# Patient Record
Sex: FEMALE | Race: WHITE | NOT HISPANIC OR LATINO | Employment: OTHER | URBAN - METROPOLITAN AREA
[De-identification: names, ages, dates, MRNs, and addresses within clinical notes are randomized per-mention and may not be internally consistent; named-entity substitution may affect disease eponyms.]

---

## 2017-02-03 ENCOUNTER — GENERIC CONVERSION - ENCOUNTER (OUTPATIENT)
Dept: OTHER | Facility: OTHER | Age: 47
End: 2017-02-03

## 2017-02-06 DIAGNOSIS — Z00.00 ENCOUNTER FOR GENERAL ADULT MEDICAL EXAMINATION WITHOUT ABNORMAL FINDINGS: ICD-10-CM

## 2017-06-20 ENCOUNTER — OFFICE VISIT (OUTPATIENT)
Dept: URGENT CARE | Facility: CLINIC | Age: 47
End: 2017-06-20
Payer: COMMERCIAL

## 2017-06-20 PROCEDURE — 99214 OFFICE O/P EST MOD 30 MIN: CPT

## 2017-06-20 PROCEDURE — S9088 SERVICES PROVIDED IN URGENT: HCPCS

## 2017-10-19 ENCOUNTER — ALLSCRIPTS OFFICE VISIT (OUTPATIENT)
Dept: OTHER | Facility: OTHER | Age: 47
End: 2017-10-19

## 2017-10-19 DIAGNOSIS — F31.32 BIPOLAR DISORDER, CURRENT EPISODE DEPRESSED, MODERATE (HCC): ICD-10-CM

## 2017-10-19 DIAGNOSIS — Z00.00 ENCOUNTER FOR GENERAL ADULT MEDICAL EXAMINATION WITHOUT ABNORMAL FINDINGS: ICD-10-CM

## 2017-10-19 DIAGNOSIS — N92.0 EXCESSIVE AND FREQUENT MENSTRUATION WITH REGULAR CYCLE: ICD-10-CM

## 2017-10-19 DIAGNOSIS — Z12.11 ENCOUNTER FOR SCREENING FOR MALIGNANT NEOPLASM OF COLON: ICD-10-CM

## 2017-10-31 ENCOUNTER — APPOINTMENT (OUTPATIENT)
Dept: LAB | Facility: HOSPITAL | Age: 47
End: 2017-10-31
Payer: COMMERCIAL

## 2017-10-31 DIAGNOSIS — Z12.11 ENCOUNTER FOR SCREENING FOR MALIGNANT NEOPLASM OF COLON: ICD-10-CM

## 2017-10-31 LAB — HEMOCCULT STL QL IA: NEGATIVE

## 2017-10-31 PROCEDURE — G0328 FECAL BLOOD SCRN IMMUNOASSAY: HCPCS

## 2018-01-15 NOTE — PROGRESS NOTES
Assessment    1  Bipolar I disorder, most recent episode depressed, moderate (296 52) (F31 32)   2  Encounter for preventive health examination (V70 0) (Z00 00)   3  Menorrhagia with regular cycle (626 2) (N92 0)   4  Fatigue (780 79) (R53 83)    Plan  Bipolar I disorder, most recent episode depressed, moderate    · (1) CBC/PLT/DIFF; Status:Active - Retrospective By Protocol Authorization; Requested  for:19Oct2017;    · (1) COMPREHENSIVE METABOLIC PANEL; Status:Active - Retrospective By Protocol  Authorization; Requested for:19Oct2017;    · (1) TSH WITH FT4 REFLEX; Status:Active - Retrospective By Protocol Authorization; Requested WFU:[id number];    · *VB - Depression Follow Up With Primary Care Provider Evaluation and Treatment   Follow-up  Status: Hold For - Scheduling,Retrospective By Protocol Authorization   Requested for: [id number]  Bipolar I disorder, most recent episode depressed, moderate, Health Maintenance    · (1) LIPID PANEL, FASTING; Status:Active - Retrospective By Protocol Authorization; Requested for:19Oct2017;    · (1) URINALYSIS (will reflex a microscopy if leukocytes, occult blood, protein or nitrites  are not within normal limits); Status:Active - Retrospective By Protocol Authorization; Requested FARIDEH:[id number];   Screening for colon cancer    · (1) OCCULT BLOOD, FECAL IMMUNOCHEMICAL TEST; Status:Active - Retrospective By  Protocol Authorization; Requested HLU:[id number];     Discussion/Summary  health maintenance visit cervical cancer screening is current Breast cancer screening: mammogram is needed every two years  Colorectal cancer screening: fecal occult blood testing supplies were given  Screening lab work includes hemoglobin, glucose, lipid profile, thyroid function testing, 25-hydroxyvitamin D and urinalysis  The risks and benefits of immunizations were discussed  Patient discussion: discussed with the patient       Follow up lab work  Will call with results  1 year follow up unless any problems or concerns arise  The patient was counseled regarding  total time of encounter was 30 min minutes  Possible side effects of new medications were reviewed with the patient/guardian today  The treatment plan was reviewed with the patient/guardian  The patient/guardian understands and agrees with the treatment plan      Chief Complaint  annual well visit      History of Present Illness  HM, Adult Female: The patient is being seen for a health maintenance evaluation  The last health maintenance visit was 1 year(s) ago  General Health: The patient's health since the last visit is described as good  She has regular dental visits  She complains of vision problems  Vision care includes wearing glasses and an eye examination within the last year  The patient complains of bifocals for reading  She has hearing loss  Lifestyle:  She consumes a diverse and healthy diet  She has weight concerns  Weight control issues: recent > 5 lbs lbs weight gain  She exercises regularly  She exercises 3 or more times per week  Exercise includes aerobic conditioning  She does not use tobacco  She denies alcohol use  She denies drug use  Reproductive health: the patient is premenopausal    Screening: cancer screening reviewed and current  metabolic screening reviewed and current  risk screening reviewed and current  HPI: Pt here for annual wellness check up  Lost her lab slips but will get new blood work next week and FIT test  Pt reports weight gain despite rigorous work out, emotional lability, long menses, hot flashes, hair falling out, is worried her thyroid function may be "out of whack"      Review of Systems    Constitutional: feeling tired, but no fever, not feeling poorly and no chills  Eyes: eyesight problems, but no eye pain, eyes not red and no purulent discharge from the eyes  ENT: seasonal allergies, but no earache, no nosebleeds, no hearing loss and no nasal discharge     Cardiovascular: the heart rate was not slow, no chest pain, the heart rate was not fast and no palpitations  Respiratory: no shortness of breath, no cough, no wheezing and no shortness of breath during exertion  Gastrointestinal: no abdominal pain, no nausea, no constipation and no diarrhea  Genitourinary: unexplained vaginal bleeding and heavy menses, but no dysuria, no pelvic pain and no dysmenorrhea  Musculoskeletal: no arthralgias, no joint swelling, no myalgias and no joint stiffness  Neurological: no headache, no confusion and no dizziness  Psychiatric: anxiety, sleep disturbances, depression and intermittent, but not suicidal    Endocrine: hot flashes  Hematologic/Lymphatic: no swollen glands and no swollen glands in the neck  ROS reviewed  Active Problems    1  Acute bronchitis (466 0) (J20 9)   2  Bipolar I disorder, most recent episode depressed, moderate (296 52) (F31 32)   3  Thoracic back pain (724 1) (M54 6)    Surgical History    · History of  Section   · Denied: History of Recent Surgery    Family History  Mother    · No pertinent family history  Father    · No pertinent family history    Social History    · Denied: History of Alcohol   ·    · Never a smoker   · No alcohol use   · No drug use   · Three children   · Unemployed (V62 0) (Z56 0)    Current Meds   1  Depakote 250 MG Oral Tablet Delayed Release; Therapy: (Recorded:[id number]) to Recorded   2  Doxycycline Monohydrate 100 MG Oral Capsule; TAKE 1 CAPSULE TWICE DAILY WITH   FOOD; Therapy: [id number] to (Evaluate:2017)  Requested for: 2017; Last   Rx:2017 Ordered   3  Wellbutrin  MG Oral Tablet Extended Release 12 Hour; Therapy: (Recorded:25Nos4266) to Recorded    Allergies    1   No Known Drug Allergies    Vitals   Recorded: [id number] 08:35AM Recorded: 08FDI7694 08:34AM   Temperature 31 0 F    Systolic 498    Diastolic 70    Height 5 ft 4 in 5 ft 4 in   Weight 130 lb  133 lb    BMI Calculated 22 31 22 83   BSA Calculated 1 63 1 64     Physical Exam    Constitutional   General appearance: No acute distress, well appearing and well nourished  Head and Face   Head and face: Normal     Palpation of the face and sinuses: No sinus tenderness  Eyes   Conjunctiva and lids: No swelling, erythema or discharge  Pupils and irises: Equal, round, reactive to light  Ophthalmoscopic examination: Normal fundi and optic discs  Ears, Nose, Mouth, and Throat   External inspection of ears and nose: Normal     Otoscopic examination: Tympanic membranes translucent with normal light reflex  Canals patent without erythema  Hearing: Normal     Nasal mucosa, septum, and turbinates: Normal without edema or erythema  Lips, teeth, and gums: Normal, good dentition  Oropharynx: Normal with no erythema, edema, exudate or lesions  Neck   Neck: Supple, symmetric, trachea midline, no masses  Thyroid: Normal, no thyromegaly  Pulmonary   Respiratory effort: No increased work of breathing or signs of respiratory distress  Percussion of chest: Normal     Palpation of chest: Normal     Auscultation of lungs: Clear to auscultation  Cardiovascular   Palpation of heart: Normal PMI, no thrills  Auscultation of heart: Normal rate and rhythm, normal S1 and S2, no murmurs  Abdomen   Abdomen: Non-tender, no masses  Liver and spleen: No hepatomegaly or splenomegaly  Lymphatic   Palpation of lymph nodes in neck: No lymphadenopathy  Musculoskeletal   Gait and station: Normal     Skin   Skin and subcutaneous tissue: Normal without rashes or lesions  Neurologic   Cranial nerves: Cranial nerves II-XII intact  Reflexes: 2+ and symmetric  Sensation: No sensory loss  Coordination: Normal finger to nose and heel to shin  Psychiatric   Judgment and insight: Normal     Orientation to person, place, and time: Normal     Recent and remote memory: Intact      Mood and affect: Normal        Results/Data  PHQ-9 Adult Depression Screening 18RLJ3227 09:18AM User, s     Test Name Result Flag Reference   PHQ-9 Adult Depression Score 12     Over the last two weeks, how often have you been bothered by any of the following problems? Little interest or pleasure in doing things: Several days - 1  Feeling down, depressed, or hopeless: Several days - 1  Trouble falling or staying asleep, or sleeping too much: Several days - 1  Feeling tired or having little energy: More than half the days - 2  Poor appetite or over eating: More than half the days - 2  Feeling bad about yourself - or that you are a failure or have let yourself or your family down: More than half the days - 2  Trouble concentrating on things, such as reading the newspaper or watching television: Several days - 1  Moving or speaking so slowly that other people could have noticed   Or the opposite -  being so fidgety or restless that you have been moving around a lot more than usual: More than half the days - 2  Thoughts that you would be better off dead, or of hurting yourself in some way: Not at all - 0   PHQ-9 Adult Depression Screening Positive     PHQ-9 Difficulty Level Not difficult at all     PHQ-9 Severity Moderate Depression         Future Appointments    Date/Time Provider Specialty Site   10/30/2018 08:30 AM Corinne Kinney [facility]  [address] [place] [address]   Electronically signed by : Corinne Nevarez [facility]; Oct 19 2017  9:29AM EST                       (Author)    Electronically signed by : Kodi Rodriguez DO; Oct 19 2017  4:42PM EST

## 2018-01-22 VITALS
BODY MASS INDEX: 22.2 KG/M2 | DIASTOLIC BLOOD PRESSURE: 70 MMHG | HEIGHT: 64 IN | WEIGHT: 130 LBS | TEMPERATURE: 96.9 F | SYSTOLIC BLOOD PRESSURE: 120 MMHG

## 2018-04-13 ENCOUNTER — OFFICE VISIT (OUTPATIENT)
Dept: URGENT CARE | Facility: CLINIC | Age: 48
End: 2018-04-13
Payer: MEDICARE

## 2018-04-13 VITALS
SYSTOLIC BLOOD PRESSURE: 114 MMHG | RESPIRATION RATE: 18 BRPM | TEMPERATURE: 97 F | DIASTOLIC BLOOD PRESSURE: 69 MMHG | OXYGEN SATURATION: 99 % | HEART RATE: 64 BPM

## 2018-04-13 DIAGNOSIS — J20.9 ACUTE BRONCHITIS, UNSPECIFIED ORGANISM: ICD-10-CM

## 2018-04-13 DIAGNOSIS — J01.90 ACUTE SINUSITIS, RECURRENCE NOT SPECIFIED, UNSPECIFIED LOCATION: Primary | ICD-10-CM

## 2018-04-13 PROCEDURE — G0463 HOSPITAL OUTPT CLINIC VISIT: HCPCS | Performed by: NURSE PRACTITIONER

## 2018-04-13 PROCEDURE — 99213 OFFICE O/P EST LOW 20 MIN: CPT | Performed by: NURSE PRACTITIONER

## 2018-04-13 RX ORDER — BUPROPION HYDROCHLORIDE 150 MG/1
TABLET, EXTENDED RELEASE ORAL
COMMUNITY
End: 2020-10-09 | Stop reason: HOSPADM

## 2018-04-13 RX ORDER — AMOXICILLIN 500 MG/1
500 CAPSULE ORAL EVERY 8 HOURS SCHEDULED
Qty: 21 CAPSULE | Refills: 0 | Status: SHIPPED | OUTPATIENT
Start: 2018-04-13 | End: 2018-04-20

## 2018-04-13 RX ORDER — DIVALPROEX SODIUM 250 MG/1
TABLET, DELAYED RELEASE ORAL
COMMUNITY
End: 2020-10-09 | Stop reason: HOSPADM

## 2018-04-13 NOTE — PATIENT INSTRUCTIONS
Rhinosinusitis   WHAT YOU NEED TO KNOW:   Rhinosinusitis (RS) is inflammation of your nose and sinuses  It commonly begins as a virus, often as a common cold  Viruses usually last 7 to 10 days and do not need treatment  When the virus does not get better on its own, you may have bacterial RS  This means that bacteria have begun to grow inside your sinuses  Acute RS lasts less than 4 weeks  Chronic RS lasts 12 weeks or more  Recurrent RS is when you have 4 or more episodes of RS in one year  DISCHARGE INSTRUCTIONS:   Return to the emergency department if:   · Your eye and eyelid are red, swollen, and painful  · You cannot open your eye  · You have double vision or you cannot see  · Your eyeball bulges out or you cannot move your eye  · You are more sleepy than normal or you notice changes in your ability to think, move, or talk  · You have a stiff neck, a fever, or a bad headache  · You have swelling of your forehead or scalp  Contact your healthcare provider if:   · Your symptoms are worse or do not improve after 3 to 5 days of treatment  · You have questions or concerns about your condition or care  Medicines: You may need any of the following:  · Acetaminophen  decreases pain and fever  It is available without a doctor's order  Ask how much to take and how often to take it  Follow directions  Acetaminophen can cause liver damage if not taken correctly  · NSAIDs , such as ibuprofen, help decrease swelling, pain, and fever  This medicine is available with or without a doctor's order  NSAIDs can cause stomach bleeding or kidney problems in certain people  If you take blood thinner medicine, always ask your healthcare provider if NSAIDs are safe for you  Always read the medicine label and follow directions  · Nasal steroid sprays  decrease inflammation in your nose and sinuses  · Decongestants  reduce swelling and drain mucus in the nose and sinuses   They may help you breathe easier  · Antihistamines  dry mucus in the nose and relieve sneezing  · Antibiotics  treat a bacterial infection and may be needed if your symptoms do not improve or they get worse  · Take your medicine as directed  Contact your healthcare provider if you think your medicine is not helping or if you have side effects  Tell him or her if you are allergic to any medicine  Keep a list of the medicines, vitamins, and herbs you take  Include the amounts, and when and why you take them  Bring the list or the pill bottles to follow-up visits  Carry your medicine list with you in case of an emergency  Self-care:   · Rinse your sinuses  Use a sinus rinse device to rinse your nasal passages with a saline (salt water) solution  This will help thin the mucus in your nose and rinse away pollen and dirt  It will also help reduce swelling so you can breathe normally  Ask your healthcare provider how often to do this  · Breathe in steam   Heat a bowl of water until you see steam  Lean over the bowl and make a tent over your head with a large towel  Breathe deeply for about 20 minutes  Be careful not to get too close to the steam or burn yourself  Do this 3 times a day  You can also breathe deeply when you take a hot shower  · Sleep with your head elevated  Place an extra pillow under your head before you go to sleep to help your sinuses drain  · Drink liquids as directed  Ask your healthcare provider how much liquid to drink each day and which liquids are best for you  Liquids will thin the mucus in your nose and help it drain  Avoid drinks that contain alcohol or caffeine  · Do not smoke, and avoid secondhand smoke  Nicotine and other chemicals in cigarettes and cigars can make your symptoms worse  Ask your healthcare provider for information if you currently smoke and need help to quit  E-cigarettes or smokeless tobacco still contain nicotine   Talk to your healthcare provider before you use these products  Follow up with your healthcare provider as directed: Follow up if your symptoms are worse or not better after 3 to 5 days of treatment  Write down your questions so you remember to ask them during your visits  © 2017 [employer] [employer] Information is for End User's use only and may not be sold, redistributed or otherwise used for commercial purposes  All illustrations and images included in CareNotes® are the copyrighted property of A D A M , Inc  or [employer]  The above information is an  only  It is not intended as medical advice for individual conditions or treatments  Talk to your doctor, nurse or pharmacist before following any medical regimen to see if it is safe and effective for you

## 2018-04-13 NOTE — PROGRESS NOTES
[facility]        NAME: Rosalba Franco is a 53 y o  female  : 1970    MRN: [id number]  DATE: 2018  TIME: 9:49 AM    Assessment and Plan   Acute sinusitis, recurrence not specified, unspecified location [J01 90]  1  Acute sinusitis, recurrence not specified, unspecified location  amoxicillin (AMOXIL) 500 mg capsule   2  Acute bronchitis, unspecified organism  amoxicillin (AMOXIL) 500 mg capsule         Patient Instructions       Follow up with PCP in 3-5 days  Proceed to  ER if symptoms worsen  Chief Complaint     Chief Complaint   Patient presents with    Earache     Pt c/o an earache and sinus congestion for three days  History of Present Illness       Earache    There is pain in both ears  Episode onset: 3 days  The problem occurs constantly  The problem has been unchanged  Associated symptoms include coughing, headaches, rhinorrhea and a sore throat  Treatments tried: Mucinex and Flonase  Review of Systems   Review of Systems   Constitutional: Negative  HENT: Positive for ear pain, rhinorrhea and sore throat  Eyes: Negative  Respiratory: Positive for cough  Gastrointestinal: Negative  Endocrine: Negative  Genitourinary: Negative  Musculoskeletal: Negative  Neurological: Positive for headaches  Psychiatric/Behavioral: Negative            Current Medications       Current Outpatient Prescriptions:     amoxicillin (AMOXIL) 500 mg capsule, Take 1 capsule (500 mg total) by mouth every 8 (eight) hours for 7 days, Disp: 21 capsule, Rfl: 0    buPROPion (WELLBUTRIN SR) 150 mg 12 hr tablet, Take by mouth, Disp: , Rfl:     divalproex sodium (DEPAKOTE) 250 mg EC tablet, Take by mouth, Disp: , Rfl:     Current Allergies     Allergies as of 2018    (No Known Allergies)            The following portions of the patient's history were reviewed and updated as appropriate: allergies, current medications, past family history, past medical history, past social history, past surgical history and problem list      No past medical history on file  No past surgical history on file  No family history on file  Medications have been verified  Objective   /69   Pulse 64   Temp (!) 97 °F (36 1 °C)   Resp 18   SpO2 99%        Physical Exam     Physical Exam   Constitutional: She is oriented to person, place, and time  She appears well-developed and well-nourished  HENT:   Head: Normocephalic and atraumatic  Right Ear: Tympanic membrane is erythematous  Tympanic membrane is not bulging  Left Ear: Tympanic membrane is erythematous  Tympanic membrane is not bulging  Nose: Mucosal edema and rhinorrhea present  Right sinus exhibits maxillary sinus tenderness and frontal sinus tenderness  Left sinus exhibits maxillary sinus tenderness and frontal sinus tenderness  Mouth/Throat: Posterior oropharyngeal edema and posterior oropharyngeal erythema present  No oropharyngeal exudate  Eyes: Conjunctivae and EOM are normal  Pupils are equal, round, and reactive to light  Neck: Normal range of motion  Neck supple  Cardiovascular: Normal rate, regular rhythm and normal heart sounds  Pulmonary/Chest: Effort normal and breath sounds normal    Abdominal: Soft  Bowel sounds are normal    Neurological: She is alert and oriented to person, place, and time  She has normal reflexes  Skin: Skin is warm and dry  Psychiatric: She has a normal mood and affect  Nursing note and vitals reviewed

## 2018-07-13 ENCOUNTER — OFFICE VISIT (OUTPATIENT)
Dept: URGENT CARE | Facility: CLINIC | Age: 48
End: 2018-07-13
Payer: MEDICARE

## 2018-07-13 VITALS
HEART RATE: 56 BPM | DIASTOLIC BLOOD PRESSURE: 83 MMHG | SYSTOLIC BLOOD PRESSURE: 123 MMHG | RESPIRATION RATE: 14 BRPM | OXYGEN SATURATION: 100 % | TEMPERATURE: 98.5 F

## 2018-07-13 DIAGNOSIS — L23.7 ALLERGIC CONTACT DERMATITIS DUE TO PLANTS, EXCEPT FOOD: Primary | ICD-10-CM

## 2018-07-13 PROBLEM — N92.0 MENORRHAGIA WITH REGULAR CYCLE: Status: ACTIVE | Noted: 2017-10-19

## 2018-07-13 PROBLEM — R53.83 FATIGUE: Status: ACTIVE | Noted: 2017-10-19

## 2018-07-13 PROCEDURE — 99213 OFFICE O/P EST LOW 20 MIN: CPT | Performed by: FAMILY MEDICINE

## 2018-07-13 PROCEDURE — G0463 HOSPITAL OUTPT CLINIC VISIT: HCPCS | Performed by: FAMILY MEDICINE

## 2018-07-13 RX ORDER — METHYLPREDNISOLONE SODIUM SUCCINATE 40 MG/ML
40 INJECTION, POWDER, LYOPHILIZED, FOR SOLUTION INTRAMUSCULAR; INTRAVENOUS ONCE
Status: COMPLETED | OUTPATIENT
Start: 2018-07-13 | End: 2018-07-13

## 2018-07-13 RX ORDER — METHYLPREDNISOLONE 4 MG/1
TABLET ORAL
Qty: 21 TABLET | Refills: 0 | Status: SHIPPED | OUTPATIENT
Start: 2018-07-13 | End: 2018-11-21 | Stop reason: ALTCHOICE

## 2018-07-13 RX ADMIN — METHYLPREDNISOLONE SODIUM SUCCINATE 40 MG: 40 INJECTION, POWDER, LYOPHILIZED, FOR SOLUTION INTRAMUSCULAR; INTRAVENOUS at 14:40

## 2018-07-13 NOTE — PROGRESS NOTES
[facility] - Patient Visit Note  Zaheer Owen 53 y o  female MRN: [id number]      Assessment / Plan:   Diagnosis ICD-10-CM Associated Orders   1  Allergic contact dermatitis due to plants, except food L23 7        Reason For Visit / Chief Complaint  Chief Complaint   Patient presents with   [facility]     x 3 days, spreading across her body             Mame Enriquez Discussion:  Patient was given 40 milligrams of Solu-Medrol will also have a Medrol Dosepak to begin in the morning and 2 percent hydrocortisone  HPI:  Zaheer Owen is a 53 y o  female Patient           This Patient Presents with having been exposed to poison ivy approximately Monday within 24 hours her face and chin became involved along with her forearms and thighs  She denies pregnancy at this time medications include Wellbutrin and Depakote only she is allergic to no foods medications seasonal allergies or dyes per se  Patient is up-to-date on her tetanus shot  Problem List     Bipolar I disorder, most recent episode depressed, moderate (HCC)    Fatigue    Menorrhagia with regular cycle           ALLERGIES:  Allergies as of 2018    (No Known Allergies)       The following portions of the patient's history were reviewed and updated as appropriate:   Allergies, current medications, past family history, past medical history, past social history, past surgical history, and the problem list     Historical Information   Past Medical History:   Diagnosis Date    Bipolar 1 disorder ([address] )      Past Surgical History:   Procedure Laterality Date     SECTION       Social History   History   Alcohol Use No     History   Drug Use No     History   Smoking Status    Never Smoker   Smokeless Tobacco    Not on file     Family History   Problem Relation Age of Onset    No Known Problems Mother     No Known Problems Father MEDS:    Current Outpatient Prescriptions:     buPROPion (WELLBUTRIN SR) 150 mg 12 hr tablet, Take by mouth, Disp: , Rfl:     divalproex sodium (DEPAKOTE) 250 mg EC tablet, Take by mouth, Disp: , Rfl:     FACILITY ADMINISTERED MEDS:        REVIEW OF SYSTEMS    GENERAL: NEGATIVE for:  Generalized Fatigue                             Chills                              Fever                             Myalgias     OPTHALMIC: NEGATIVE for:  Diplopia                            Scotomata                            Visual Changes                            Blurred Vision     ENT:  [address] for:  Hearing Difficulty                            Tinnitus                            Vertigo                            Dizziness                            Ear Pain                            Ear Drainage               NOSE NEGATIVE for:  Nasal Congestion                            Nasal Discharge                            Sinus Pain / Pressure               THROAT NEGATIVE for:  Sore Throat / Throat Pain                            Difficulty Swallowing     RESPIRATORY: NEGATIVE for:  Cough                            Wheezing                            Sputum Production                            Sob / Tachypnea                            Hemoptysis     CARDIOVASCULAR: NEGATIVE for:  Chest Pain                             SOB (cardiac Related)                             Dyspnea on Exertion                             Orthopnea                             PND                             Leg Edema                             Palpitations                               Irregularities/rythym                       CURRENT VITALS:   Blood Pressure: 123/83 (07/13/18 1351)  Pulse: 56 (07/13/18 1351)  Temperature: 98 5 °F (36 9 °C) (07/13/18 1351)  Temp Source: Tympanic (07/13/18 1351)  Respirations: 14 (07/13/18 1351)  SpO2: 100 % (07/13/18 1351)  /83   Pulse 56   Temp 98 5 °F (36 9 °C) (Tympanic)   Resp 14 SpO2 100%       PHYSICAL EXAM:         General Appearance:    Alert, cooperative, no apparent distress, appears stated age     Oriented x3    Head:    Normocephalic, without obvious abnormality, atraumatic   Eyes:      EOM's intact,      PRIYANKA,        conjunctiva/corneas clear,          fundi not visualized well   Ears:     Normal external ear canals     Tm right side  Normal     Tm left side    Normal       Nose:   Nares normal externally, septum midline,     mucosa normal,     No anterior drainage         Sinuses   with out   tenderness to palpation / percussion     Throat:   Lips, mucosa, and tongue normal       Anterior pharynx   Normal      Posterior pharynx   Normal        No exudate obvious       Neck:   Supple, symmetrical, trachea midline and moveable    Normal thyroid click present    No carotid bruits appreciated        Lymphatics:     Adenopathy in anterior cervical chain  Normal      Adenopathy in posterior cervical chain   Normal     Lungs:     Clear to auscultation bilaterally    No rales    No ronchi    No wheeze     Heart[de-identified]    Regular rate and rhythm, S1 and S2 normal,     No S3, S4, audible    No murmurs, rubs      Extremities:     Extremities grossly normal     atraumatic,     no cyanosis or edema        Skin:   Multiple areas of poison ivy are noted especially on the thighs and on the forearms  There is small amount on their the left side of the chin and also on the left cheek of the face  Follow up at primary care in 2 or 3 days, or sooner if needed OR pesent to local Emergency Room if symptoms are worsening  Portions of the record may have been created with voice recognition software   Occasional wrong word or "sound a like" substitutions may have occurred due to the inherent limitations of voice recognition software   Read the chart carefully and recognize, using context, where substitutions have occurred

## 2018-07-13 NOTE — PATIENT INSTRUCTIONS
Patient was given specific instructions regarding Medrol Dosepak and the fact that she had received 40 milligrams of Solu-Medrol IM today she also be using her 2 percent hydrocortisone

## 2018-10-01 DIAGNOSIS — Z12.11 ENCOUNTER FOR SCREENING FOR MALIGNANT NEOPLASM OF COLON: ICD-10-CM

## 2018-10-01 DIAGNOSIS — F31.32 BIPOLAR DISORDER, CURRENT EPISODE DEPRESSED, MODERATE (HCC): ICD-10-CM

## 2018-10-01 DIAGNOSIS — Z00.00 ENCOUNTER FOR GENERAL ADULT MEDICAL EXAMINATION WITHOUT ABNORMAL FINDINGS: ICD-10-CM

## 2018-10-01 DIAGNOSIS — N92.0 EXCESSIVE AND FREQUENT MENSTRUATION WITH REGULAR CYCLE: ICD-10-CM

## 2018-11-21 ENCOUNTER — OFFICE VISIT (OUTPATIENT)
Dept: URGENT CARE | Facility: CLINIC | Age: 48
End: 2018-11-21
Payer: MEDICARE

## 2018-11-21 VITALS
HEIGHT: 64 IN | SYSTOLIC BLOOD PRESSURE: 118 MMHG | RESPIRATION RATE: 18 BRPM | DIASTOLIC BLOOD PRESSURE: 64 MMHG | OXYGEN SATURATION: 100 % | BODY MASS INDEX: 22.2 KG/M2 | WEIGHT: 130 LBS | TEMPERATURE: 98.4 F | HEART RATE: 78 BPM

## 2018-11-21 DIAGNOSIS — R21 RASH: Primary | ICD-10-CM

## 2018-11-21 PROCEDURE — G0463 HOSPITAL OUTPT CLINIC VISIT: HCPCS | Performed by: PHYSICIAN ASSISTANT

## 2018-11-21 PROCEDURE — 99213 OFFICE O/P EST LOW 20 MIN: CPT | Performed by: PHYSICIAN ASSISTANT

## 2018-11-21 RX ORDER — METHYLPREDNISOLONE 4 MG/1
TABLET ORAL
Qty: 21 TABLET | Refills: 0 | Status: SHIPPED | OUTPATIENT
Start: 2018-11-21 | End: 2019-05-02 | Stop reason: ALTCHOICE

## 2018-11-21 NOTE — PROGRESS NOTES
[facility]        NAME: Michael Ricks is a 53 y o  female  : 1970    MRN: [id number]  DATE: 2018  TIME: 10:04 AM    Assessment and Plan   Rash [R21]  1  Rash  Methylprednisolone 4 MG TBPK     Likely a contact dermatitis  Follow up with PCP  Patient Instructions     Follow up with PCP in 3-5 days  Proceed to  ER if symptoms worsen  Chief Complaint     Chief Complaint   Patient presents with    Rash     Rash on face for 3 weeks with extreme itching         History of Present Illness       53 y o  Female presents with a rash on the face x 3 weeks  Patient states the rash started as itchy 3 weeks ago when she thought it was her allergies acting up  States the rash is not painful  There is no numbness or tingling  She has tried benadryl with no relief  There is no change in vision  Patient is unsure of any new contacts such as laundry detergent, soap or face lotion  Review of Systems   Review of Systems   Constitutional: Negative for chills, fatigue and fever  HENT: Negative for congestion, ear pain, sinus pain, sore throat and trouble swallowing  Eyes: Negative for pain, discharge and redness  Respiratory: Negative for cough, chest tightness, shortness of breath and wheezing  Cardiovascular: Negative for chest pain, palpitations and leg swelling  Gastrointestinal: Negative for abdominal pain, diarrhea, nausea and vomiting  Musculoskeletal: Negative for arthralgias, joint swelling and myalgias  Skin: Positive for rash  Neurological: Negative for dizziness, weakness, numbness and headaches           Current Medications       Current Outpatient Prescriptions:     buPROPion (WELLBUTRIN SR) 150 mg 12 hr tablet, Take by mouth, Disp: , Rfl:     divalproex sodium (DEPAKOTE) 250 mg EC tablet, Take by mouth, Disp: , Rfl:     divalproex sodium (DEPAKOTE) 500 mg EC tablet, Take 1,500 mg by mouth 8PM, Disp: , Rfl: 0    Methylprednisolone 4 MG TBPK, Use as directed on package, Disp: 21 tablet, Rfl: 0    temazepam (RESTORIL) 15 mg capsule, , Disp: , Rfl: 0    Current Allergies     Allergies as of 2018    (No Known Allergies)            The following portions of the patient's history were reviewed and updated as appropriate: allergies, current medications, past family history, past medical history, past social history, past surgical history and problem list      Past Medical History:   Diagnosis Date    Allergic     Anxiety     Bipolar 1 disorder ([facility] [address] )     Depression        Past Surgical History:   Procedure Laterality Date     SECTION         Family History   Problem Relation Age of Onset    No Known Problems Mother     No Known Problems Father          Medications have been verified  Objective   /64   Pulse 78   Temp 98 4 °F (36 9 °C)   Resp 18   Ht 5' 4" (1 626 m)   Wt 59 kg (130 lb)   LMP 10/31/2018 (Approximate)   SpO2 100%   BMI 22 31 kg/m²        Physical Exam     Physical Exam   Constitutional: She is oriented to person, place, and time  She appears well-developed and well-nourished  No distress  HENT:   Head: Normocephalic  Right Ear: External ear normal    Left Ear: External ear normal    Mouth/Throat: Oropharynx is clear and moist    Eyes: Pupils are equal, round, and reactive to light  Conjunctivae and EOM are normal    Neck: Normal range of motion  Neck supple  Cardiovascular: Normal rate, regular rhythm and normal heart sounds  No murmur heard  Pulmonary/Chest: Effort normal and breath sounds normal  No respiratory distress  She has no wheezes  Abdominal: Soft  Bowel sounds are normal  There is no tenderness  Musculoskeletal: Normal range of motion  Lymphadenopathy:     She has no cervical adenopathy  Neurological: She is alert and oriented to person, place, and time  She has normal reflexes  Skin: Skin is warm and dry  Rash noted  Rash is papular  Psychiatric: She has a normal mood and affect  Nursing note and vitals reviewed

## 2018-12-26 DIAGNOSIS — Z00.00 ROUTINE ADULT HEALTH MAINTENANCE: Primary | ICD-10-CM

## 2018-12-26 DIAGNOSIS — E55.9 VITAMIN D DEFICIENCY: ICD-10-CM

## 2019-03-18 ENCOUNTER — TRANSCRIBE ORDERS (OUTPATIENT)
Dept: ADMINISTRATIVE | Facility: HOSPITAL | Age: 49
End: 2019-03-18

## 2019-03-18 ENCOUNTER — APPOINTMENT (OUTPATIENT)
Dept: LAB | Facility: HOSPITAL | Age: 49
End: 2019-03-18
Payer: MEDICARE

## 2019-03-18 DIAGNOSIS — Z00.00 ROUTINE ADULT HEALTH MAINTENANCE: ICD-10-CM

## 2019-03-18 DIAGNOSIS — E55.9 VITAMIN D DEFICIENCY: ICD-10-CM

## 2019-03-18 DIAGNOSIS — Z79.899 ENCOUNTER FOR LONG-TERM (CURRENT) USE OF MEDICATIONS: ICD-10-CM

## 2019-03-18 DIAGNOSIS — Z79.899 ENCOUNTER FOR LONG-TERM (CURRENT) USE OF MEDICATIONS: Primary | ICD-10-CM

## 2019-03-18 DIAGNOSIS — N92.0 EXCESSIVE AND FREQUENT MENSTRUATION WITH REGULAR CYCLE: ICD-10-CM

## 2019-03-18 DIAGNOSIS — F31.32 BIPOLAR DISORDER, CURRENT EPISODE DEPRESSED, MODERATE (HCC): ICD-10-CM

## 2019-03-18 LAB
25(OH)D3 SERPL-MCNC: 50.7 NG/ML (ref 30–100)
ALBUMIN SERPL BCP-MCNC: 4.1 G/DL (ref 3.5–5.7)
ALP SERPL-CCNC: 25 U/L (ref 40–150)
ALT SERPL W P-5'-P-CCNC: 14 U/L (ref 7–52)
ANION GAP SERPL CALCULATED.3IONS-SCNC: 6 MMOL/L (ref 4–13)
AST SERPL W P-5'-P-CCNC: 13 U/L (ref 13–39)
BASOPHILS # BLD AUTO: 0.1 THOUSANDS/ΜL (ref 0–0.1)
BASOPHILS NFR BLD AUTO: 1 % (ref 0–2)
BILIRUB SERPL-MCNC: 0.5 MG/DL (ref 0.2–1)
BILIRUB UR QL STRIP: NEGATIVE
BUN SERPL-MCNC: 12 MG/DL (ref 7–25)
CALCIUM SERPL-MCNC: 9.2 MG/DL (ref 8.6–10.5)
CHLORIDE SERPL-SCNC: 99 MMOL/L (ref 98–107)
CHOLEST SERPL-MCNC: 176 MG/DL (ref 0–200)
CLARITY UR: CLEAR
CO2 SERPL-SCNC: 29 MMOL/L (ref 21–31)
COLOR UR: YELLOW
CREAT SERPL-MCNC: 0.79 MG/DL (ref 0.6–1.2)
EOSINOPHIL # BLD AUTO: 0.1 THOUSAND/ΜL (ref 0–0.61)
EOSINOPHIL NFR BLD AUTO: 2 % (ref 0–5)
ERYTHROCYTE [DISTWIDTH] IN BLOOD BY AUTOMATED COUNT: 12.8 % (ref 11.5–14.5)
GFR SERPL CREATININE-BSD FRML MDRD: 89 ML/MIN/1.73SQ M
GLUCOSE P FAST SERPL-MCNC: 100 MG/DL (ref 65–99)
GLUCOSE UR STRIP-MCNC: NEGATIVE MG/DL
HCT VFR BLD AUTO: 40.8 % (ref 42–47)
HDLC SERPL-MCNC: 71 MG/DL (ref 40–60)
HGB BLD-MCNC: 13.4 G/DL (ref 12–16)
HGB UR QL STRIP.AUTO: NEGATIVE
KETONES UR STRIP-MCNC: ABNORMAL MG/DL
LDLC SERPL CALC-MCNC: 85 MG/DL (ref 75–193)
LEUKOCYTE ESTERASE UR QL STRIP: NEGATIVE
LYMPHOCYTES # BLD AUTO: 2.5 THOUSANDS/ΜL (ref 0.6–4.47)
LYMPHOCYTES NFR BLD AUTO: 43 % (ref 21–51)
MCH RBC QN AUTO: 30.3 PG (ref 26–34)
MCHC RBC AUTO-ENTMCNC: 32.8 G/DL (ref 31–37)
MCV RBC AUTO: 92 FL (ref 81–99)
MONOCYTES # BLD AUTO: 0.7 THOUSAND/ΜL (ref 0.17–1.22)
MONOCYTES NFR BLD AUTO: 11 % (ref 2–12)
NEUTROPHILS # BLD AUTO: 2.6 THOUSANDS/ΜL (ref 1.4–6.5)
NEUTS SEG NFR BLD AUTO: 44 % (ref 42–75)
NITRITE UR QL STRIP: NEGATIVE
NONHDLC SERPL-MCNC: 105 MG/DL
NRBC BLD AUTO-RTO: 0 /100 WBCS
PH UR STRIP.AUTO: 6.5 [PH]
PLATELET # BLD AUTO: 220 THOUSANDS/UL (ref 149–390)
PMV BLD AUTO: 9.7 FL (ref 8.6–11.7)
POTASSIUM SERPL-SCNC: 4.6 MMOL/L (ref 3.5–5.5)
PROT SERPL-MCNC: 5.9 G/DL (ref 6.4–8.9)
PROT UR STRIP-MCNC: NEGATIVE MG/DL
RBC # BLD AUTO: 4.42 MILLION/UL (ref 3.9–5.2)
SODIUM SERPL-SCNC: 134 MMOL/L (ref 134–143)
SP GR UR STRIP.AUTO: 1.02 (ref 1–1.03)
TRIGL SERPL-MCNC: 99 MG/DL (ref 44–166)
TSH SERPL DL<=0.05 MIU/L-ACNC: 2 UIU/ML (ref 0.45–5.33)
UROBILINOGEN UR QL STRIP.AUTO: 0.2 E.U./DL
VALPROATE SERPL-MCNC: 64.7 UG/ML (ref 50–125)
WBC # BLD AUTO: 5.9 THOUSAND/UL (ref 4.8–10.8)

## 2019-03-18 PROCEDURE — 80164 ASSAY DIPROPYLACETIC ACD TOT: CPT

## 2019-03-18 PROCEDURE — 81003 URINALYSIS AUTO W/O SCOPE: CPT

## 2019-03-18 PROCEDURE — 80061 LIPID PANEL: CPT

## 2019-03-18 PROCEDURE — 84443 ASSAY THYROID STIM HORMONE: CPT

## 2019-03-18 PROCEDURE — 85025 COMPLETE CBC W/AUTO DIFF WBC: CPT

## 2019-03-18 PROCEDURE — 82306 VITAMIN D 25 HYDROXY: CPT

## 2019-03-18 PROCEDURE — 80053 COMPREHEN METABOLIC PANEL: CPT

## 2019-03-18 PROCEDURE — 36415 COLL VENOUS BLD VENIPUNCTURE: CPT

## 2019-05-02 ENCOUNTER — OFFICE VISIT (OUTPATIENT)
Dept: FAMILY MEDICINE CLINIC | Facility: CLINIC | Age: 49
End: 2019-05-02
Payer: MEDICARE

## 2019-05-02 VITALS
HEART RATE: 76 BPM | SYSTOLIC BLOOD PRESSURE: 110 MMHG | DIASTOLIC BLOOD PRESSURE: 78 MMHG | TEMPERATURE: 98.8 F | WEIGHT: 132 LBS | HEIGHT: 64 IN | OXYGEN SATURATION: 98 % | BODY MASS INDEX: 22.53 KG/M2

## 2019-05-02 DIAGNOSIS — Z12.39 SCREENING FOR BREAST CANCER: ICD-10-CM

## 2019-05-02 DIAGNOSIS — F31.32 BIPOLAR I DISORDER, MOST RECENT EPISODE DEPRESSED, MODERATE (HCC): ICD-10-CM

## 2019-05-02 DIAGNOSIS — Z00.00 MEDICARE ANNUAL WELLNESS VISIT, SUBSEQUENT: Primary | ICD-10-CM

## 2019-05-02 DIAGNOSIS — N92.0 MENORRHAGIA WITH REGULAR CYCLE: ICD-10-CM

## 2019-05-02 DIAGNOSIS — L20.84 INTRINSIC ECZEMA: ICD-10-CM

## 2019-05-02 PROCEDURE — 99214 OFFICE O/P EST MOD 30 MIN: CPT | Performed by: NURSE PRACTITIONER

## 2019-05-02 PROCEDURE — G0439 PPPS, SUBSEQ VISIT: HCPCS | Performed by: NURSE PRACTITIONER

## 2019-05-06 DIAGNOSIS — R21 RASH: Primary | ICD-10-CM

## 2019-05-06 RX ORDER — BETAMETHASONE DIPROPIONATE 0.5 MG/G
CREAM TOPICAL 2 TIMES DAILY
Qty: 30 G | Refills: 3 | Status: SHIPPED | OUTPATIENT
Start: 2019-05-06 | End: 2020-10-01

## 2020-09-23 DIAGNOSIS — N92.0 MENORRHAGIA WITH REGULAR CYCLE: ICD-10-CM

## 2020-09-23 DIAGNOSIS — R53.83 FATIGUE, UNSPECIFIED TYPE: ICD-10-CM

## 2020-09-23 DIAGNOSIS — Z13.220 SCREENING FOR HYPERLIPIDEMIA: ICD-10-CM

## 2020-09-23 DIAGNOSIS — Z13.6 ENCOUNTER FOR SCREENING FOR CARDIOVASCULAR DISORDERS: ICD-10-CM

## 2020-09-23 DIAGNOSIS — Z11.4 SCREENING FOR HIV WITHOUT PRESENCE OF RISK FACTORS: Primary | ICD-10-CM

## 2020-10-01 ENCOUNTER — HOSPITAL ENCOUNTER (INPATIENT)
Facility: HOSPITAL | Age: 50
LOS: 7 days | Discharge: HOME | DRG: 885 | End: 2020-10-09
Attending: EMERGENCY MEDICINE | Admitting: PSYCHIATRY & NEUROLOGY
Payer: COMMERCIAL

## 2020-10-01 DIAGNOSIS — F31.32 BIPOLAR I DISORDER, MOST RECENT EPISODE DEPRESSED, MODERATE (HCC): Primary | ICD-10-CM

## 2020-10-01 DIAGNOSIS — F31.9 BIPOLAR DISORDER (HCC): ICD-10-CM

## 2020-10-01 DIAGNOSIS — F10.129 ALCOHOL ABUSE WITH INTOXICATION WITH COMPLICATION (HCC): ICD-10-CM

## 2020-10-01 LAB
AMPHETAMINES SERPL QL SCN: NEGATIVE
BARBITURATES UR QL: NEGATIVE
BENZODIAZ UR QL: NEGATIVE
COCAINE UR QL: NEGATIVE
ETHANOL EXG-MCNC: NORMAL MG/DL
ETHANOL SERPL-MCNC: 228.9 MG/DL
ETHANOL SERPL-MCNC: 62.1 MG/DL
METHADONE UR QL: NEGATIVE
OPIATES UR QL SCN: NEGATIVE
OXYCODONE+OXYMORPHONE UR QL SCN: NEGATIVE
PCP UR QL: NEGATIVE
SARS-COV-2 RNA RESP QL NAA+PROBE: NEGATIVE
THC UR QL: NEGATIVE

## 2020-10-01 PROCEDURE — 36415 COLL VENOUS BLD VENIPUNCTURE: CPT

## 2020-10-01 PROCEDURE — 99284 EMERGENCY DEPT VISIT MOD MDM: CPT | Performed by: EMERGENCY MEDICINE

## 2020-10-01 PROCEDURE — 82075 ASSAY OF BREATH ETHANOL: CPT | Performed by: EMERGENCY MEDICINE

## 2020-10-01 PROCEDURE — 87635 SARS-COV-2 COVID-19 AMP PRB: CPT

## 2020-10-01 PROCEDURE — 80307 DRUG TEST PRSMV CHEM ANLYZR: CPT | Performed by: EMERGENCY MEDICINE

## 2020-10-01 PROCEDURE — 80320 DRUG SCREEN QUANTALCOHOLS: CPT

## 2020-10-01 PROCEDURE — 80320 DRUG SCREEN QUANTALCOHOLS: CPT | Performed by: EMERGENCY MEDICINE

## 2020-10-01 PROCEDURE — 99285 EMERGENCY DEPT VISIT HI MDM: CPT

## 2020-10-02 PROBLEM — Z00.8 MEDICAL CLEARANCE FOR PSYCHIATRIC ADMISSION: Status: ACTIVE | Noted: 2020-10-02

## 2020-10-02 PROBLEM — F10.10 ALCOHOL ABUSE: Status: ACTIVE | Noted: 2020-10-02

## 2020-10-02 LAB
EXT PREG TEST URINE: NEGATIVE
EXT. CONTROL ED NAV: NORMAL

## 2020-10-02 PROCEDURE — 93005 ELECTROCARDIOGRAM TRACING: CPT

## 2020-10-02 PROCEDURE — 99253 IP/OBS CNSLTJ NEW/EST LOW 45: CPT | Performed by: PHYSICIAN ASSISTANT

## 2020-10-02 PROCEDURE — 81025 URINE PREGNANCY TEST: CPT | Performed by: EMERGENCY MEDICINE

## 2020-10-02 RX ORDER — TRAZODONE HYDROCHLORIDE 50 MG/1
50 TABLET ORAL
Status: DISCONTINUED | OUTPATIENT
Start: 2020-10-02 | End: 2020-10-03

## 2020-10-02 RX ORDER — GABAPENTIN 300 MG/1
300 CAPSULE ORAL 3 TIMES DAILY
Status: DISCONTINUED | OUTPATIENT
Start: 2020-10-02 | End: 2020-10-07

## 2020-10-02 RX ORDER — RISPERIDONE 1 MG/1
1 TABLET, ORALLY DISINTEGRATING ORAL
Status: DISCONTINUED | OUTPATIENT
Start: 2020-10-02 | End: 2020-10-09 | Stop reason: HOSPADM

## 2020-10-02 RX ORDER — ACETAMINOPHEN 325 MG/1
650 TABLET ORAL EVERY 6 HOURS PRN
Status: DISCONTINUED | OUTPATIENT
Start: 2020-10-02 | End: 2020-10-09 | Stop reason: HOSPADM

## 2020-10-02 RX ORDER — HALOPERIDOL 5 MG
5 TABLET ORAL EVERY 6 HOURS PRN
Status: DISCONTINUED | OUTPATIENT
Start: 2020-10-02 | End: 2020-10-09 | Stop reason: HOSPADM

## 2020-10-02 RX ORDER — ONDANSETRON 4 MG/1
4 TABLET, ORALLY DISINTEGRATING ORAL ONCE
Status: COMPLETED | OUTPATIENT
Start: 2020-10-02 | End: 2020-10-02

## 2020-10-02 RX ORDER — BUPROPION HYDROCHLORIDE 150 MG/1
300 TABLET ORAL ONCE
Status: COMPLETED | OUTPATIENT
Start: 2020-10-02 | End: 2020-10-02

## 2020-10-02 RX ORDER — LORAZEPAM 2 MG/ML
2 INJECTION INTRAMUSCULAR EVERY 4 HOURS PRN
Status: DISCONTINUED | OUTPATIENT
Start: 2020-10-02 | End: 2020-10-09 | Stop reason: HOSPADM

## 2020-10-02 RX ORDER — FOLIC ACID 1 MG/1
1 TABLET ORAL DAILY
Status: DISCONTINUED | OUTPATIENT
Start: 2020-10-03 | End: 2020-10-09 | Stop reason: HOSPADM

## 2020-10-02 RX ORDER — TEMAZEPAM 15 MG/1
15 CAPSULE ORAL ONCE
Status: DISCONTINUED | OUTPATIENT
Start: 2020-10-02 | End: 2020-10-02

## 2020-10-02 RX ORDER — HYDROXYZINE HYDROCHLORIDE 25 MG/1
50 TABLET, FILM COATED ORAL EVERY 4 HOURS PRN
Status: DISCONTINUED | OUTPATIENT
Start: 2020-10-02 | End: 2020-10-09 | Stop reason: HOSPADM

## 2020-10-02 RX ORDER — THIAMINE MONONITRATE (VIT B1) 100 MG
100 TABLET ORAL DAILY
Status: DISCONTINUED | OUTPATIENT
Start: 2020-10-03 | End: 2020-10-09 | Stop reason: HOSPADM

## 2020-10-02 RX ORDER — HYDROXYZINE HYDROCHLORIDE 25 MG/1
25 TABLET, FILM COATED ORAL EVERY 4 HOURS PRN
Status: DISCONTINUED | OUTPATIENT
Start: 2020-10-02 | End: 2020-10-09 | Stop reason: HOSPADM

## 2020-10-02 RX ORDER — ACETAMINOPHEN 325 MG/1
650 TABLET ORAL EVERY 4 HOURS PRN
Status: DISCONTINUED | OUTPATIENT
Start: 2020-10-02 | End: 2020-10-09 | Stop reason: HOSPADM

## 2020-10-02 RX ORDER — ACETAMINOPHEN 325 MG/1
975 TABLET ORAL EVERY 6 HOURS PRN
Status: DISCONTINUED | OUTPATIENT
Start: 2020-10-02 | End: 2020-10-09 | Stop reason: HOSPADM

## 2020-10-02 RX ORDER — DIVALPROEX SODIUM 500 MG/1
1500 TABLET, DELAYED RELEASE ORAL ONCE
Status: COMPLETED | OUTPATIENT
Start: 2020-10-02 | End: 2020-10-02

## 2020-10-02 RX ORDER — DIVALPROEX SODIUM 500 MG/1
500 TABLET, DELAYED RELEASE ORAL ONCE
Status: DISCONTINUED | OUTPATIENT
Start: 2020-10-02 | End: 2020-10-02

## 2020-10-02 RX ORDER — OLANZAPINE 10 MG/1
10 INJECTION, POWDER, LYOPHILIZED, FOR SOLUTION INTRAMUSCULAR
Status: DISCONTINUED | OUTPATIENT
Start: 2020-10-02 | End: 2020-10-09 | Stop reason: HOSPADM

## 2020-10-02 RX ORDER — DIAZEPAM 5 MG/1
10 TABLET ORAL ONCE
Status: COMPLETED | OUTPATIENT
Start: 2020-10-02 | End: 2020-10-02

## 2020-10-02 RX ORDER — TEMAZEPAM 15 MG/1
15 CAPSULE ORAL ONCE
Status: COMPLETED | OUTPATIENT
Start: 2020-10-02 | End: 2020-10-02

## 2020-10-02 RX ORDER — HALOPERIDOL 5 MG/ML
5 INJECTION INTRAMUSCULAR EVERY 6 HOURS PRN
Status: DISCONTINUED | OUTPATIENT
Start: 2020-10-02 | End: 2020-10-09 | Stop reason: HOSPADM

## 2020-10-02 RX ADMIN — GABAPENTIN 300 MG: 300 CAPSULE ORAL at 21:23

## 2020-10-02 RX ADMIN — TEMAZEPAM 15 MG: 15 CAPSULE ORAL at 21:23

## 2020-10-02 RX ADMIN — DIAZEPAM 10 MG: 5 TABLET ORAL at 00:22

## 2020-10-02 RX ADMIN — DIVALPROEX SODIUM 1500 MG: 500 TABLET, DELAYED RELEASE ORAL at 08:52

## 2020-10-02 RX ADMIN — GABAPENTIN 300 MG: 300 CAPSULE ORAL at 18:08

## 2020-10-02 RX ADMIN — BUPROPION HYDROCHLORIDE 300 MG: 150 TABLET, FILM COATED, EXTENDED RELEASE ORAL at 08:52

## 2020-10-02 RX ADMIN — ONDANSETRON 4 MG: 4 TABLET, ORALLY DISINTEGRATING ORAL at 05:05

## 2020-10-03 PROBLEM — F10.20 ALCOHOL USE DISORDER, SEVERE, DEPENDENCE (HCC): Status: ACTIVE | Noted: 2020-10-03

## 2020-10-03 LAB
CHOLEST SERPL-MCNC: 180 MG/DL (ref 0–200)
HDLC SERPL-MCNC: 67 MG/DL
LDLC SERPL CALC-MCNC: 91 MG/DL (ref 0–100)
NONHDLC SERPL-MCNC: 113 MG/DL
TRIGL SERPL-MCNC: 111 MG/DL (ref 44–166)

## 2020-10-03 PROCEDURE — 99221 1ST HOSP IP/OBS SF/LOW 40: CPT | Performed by: PSYCHIATRY & NEUROLOGY

## 2020-10-03 PROCEDURE — 80061 LIPID PANEL: CPT | Performed by: NURSE PRACTITIONER

## 2020-10-03 RX ORDER — TEMAZEPAM 15 MG/1
15 CAPSULE ORAL
Status: DISCONTINUED | OUTPATIENT
Start: 2020-10-03 | End: 2020-10-04

## 2020-10-03 RX ORDER — LORAZEPAM 1 MG/1
1 TABLET ORAL EVERY 4 HOURS PRN
Status: DISCONTINUED | OUTPATIENT
Start: 2020-10-03 | End: 2020-10-09 | Stop reason: HOSPADM

## 2020-10-03 RX ORDER — NALTREXONE HYDROCHLORIDE 50 MG/1
25 TABLET, FILM COATED ORAL DAILY
Status: DISCONTINUED | OUTPATIENT
Start: 2020-10-03 | End: 2020-10-09 | Stop reason: HOSPADM

## 2020-10-03 RX ORDER — ESCITALOPRAM OXALATE 5 MG/1
5 TABLET ORAL DAILY
Status: DISCONTINUED | OUTPATIENT
Start: 2020-10-03 | End: 2020-10-05

## 2020-10-03 RX ADMIN — GABAPENTIN 300 MG: 300 CAPSULE ORAL at 09:26

## 2020-10-03 RX ADMIN — ESCITALOPRAM 5 MG: 5 TABLET, FILM COATED ORAL at 13:37

## 2020-10-03 RX ADMIN — Medication 1 TABLET: at 09:25

## 2020-10-03 RX ADMIN — TEMAZEPAM 15 MG: 15 CAPSULE ORAL at 22:04

## 2020-10-03 RX ADMIN — DIVALPROEX SODIUM 1750 MG: 500 TABLET, EXTENDED RELEASE ORAL at 17:40

## 2020-10-03 RX ADMIN — THIAMINE HCL TAB 100 MG 100 MG: 100 TAB at 09:25

## 2020-10-03 RX ADMIN — GABAPENTIN 300 MG: 300 CAPSULE ORAL at 17:40

## 2020-10-03 RX ADMIN — NALTREXONE HYDROCHLORIDE 25 MG: 50 TABLET, FILM COATED ORAL at 13:36

## 2020-10-03 RX ADMIN — GABAPENTIN 300 MG: 300 CAPSULE ORAL at 22:00

## 2020-10-03 RX ADMIN — FOLIC ACID 1 MG: 1 TABLET ORAL at 09:25

## 2020-10-04 LAB
ATRIAL RATE: 61 BPM
P AXIS: 27 DEGREES
PR INTERVAL: 140 MS
QRS AXIS: 72 DEGREES
QRSD INTERVAL: 78 MS
QT INTERVAL: 396 MS
QTC INTERVAL: 398 MS
T WAVE AXIS: 37 DEGREES
VENTRICULAR RATE: 61 BPM

## 2020-10-04 PROCEDURE — 93010 ELECTROCARDIOGRAM REPORT: CPT | Performed by: INTERNAL MEDICINE

## 2020-10-04 PROCEDURE — 99232 SBSQ HOSP IP/OBS MODERATE 35: CPT | Performed by: PSYCHIATRY & NEUROLOGY

## 2020-10-04 RX ORDER — TEMAZEPAM 7.5 MG/1
7.5 CAPSULE ORAL
Status: DISCONTINUED | OUTPATIENT
Start: 2020-10-04 | End: 2020-10-05

## 2020-10-04 RX ADMIN — GABAPENTIN 300 MG: 300 CAPSULE ORAL at 09:09

## 2020-10-04 RX ADMIN — Medication 1 TABLET: at 09:09

## 2020-10-04 RX ADMIN — NALTREXONE HYDROCHLORIDE 25 MG: 50 TABLET, FILM COATED ORAL at 09:09

## 2020-10-04 RX ADMIN — FOLIC ACID 1 MG: 1 TABLET ORAL at 09:09

## 2020-10-04 RX ADMIN — LORAZEPAM 1 MG: 1 TABLET ORAL at 22:11

## 2020-10-04 RX ADMIN — THIAMINE HCL TAB 100 MG 100 MG: 100 TAB at 09:09

## 2020-10-04 RX ADMIN — ESCITALOPRAM 5 MG: 5 TABLET, FILM COATED ORAL at 09:09

## 2020-10-04 RX ADMIN — DIVALPROEX SODIUM 1750 MG: 500 TABLET, EXTENDED RELEASE ORAL at 22:11

## 2020-10-04 RX ADMIN — GABAPENTIN 300 MG: 300 CAPSULE ORAL at 22:11

## 2020-10-04 RX ADMIN — GABAPENTIN 300 MG: 300 CAPSULE ORAL at 17:55

## 2020-10-05 LAB
ALBUMIN SERPL BCP-MCNC: 3.6 G/DL (ref 3.5–5.7)
ALP SERPL-CCNC: 18 U/L (ref 40–150)
ALT SERPL W P-5'-P-CCNC: 12 U/L (ref 7–52)
ANION GAP SERPL CALCULATED.3IONS-SCNC: 6 MMOL/L (ref 4–13)
AST SERPL W P-5'-P-CCNC: 19 U/L (ref 13–39)
BILIRUB SERPL-MCNC: 0.4 MG/DL (ref 0.2–1)
BUN SERPL-MCNC: 9 MG/DL (ref 7–25)
CALCIUM SERPL-MCNC: 8.5 MG/DL (ref 8.6–10.5)
CHLORIDE SERPL-SCNC: 102 MMOL/L (ref 98–107)
CO2 SERPL-SCNC: 29 MMOL/L (ref 21–31)
CREAT SERPL-MCNC: 0.88 MG/DL (ref 0.6–1.2)
GFR SERPL CREATININE-BSD FRML MDRD: 77 ML/MIN/1.73SQ M
GLUCOSE P FAST SERPL-MCNC: 97 MG/DL (ref 65–99)
GLUCOSE SERPL-MCNC: 97 MG/DL (ref 65–99)
POTASSIUM SERPL-SCNC: 4.1 MMOL/L (ref 3.5–5.5)
PROT SERPL-MCNC: 5.6 G/DL (ref 6.4–8.9)
RPR SER QL: NORMAL
SODIUM SERPL-SCNC: 137 MMOL/L (ref 134–143)
TSH SERPL DL<=0.05 MIU/L-ACNC: 0.8 UIU/ML (ref 0.45–5.33)

## 2020-10-05 PROCEDURE — 86592 SYPHILIS TEST NON-TREP QUAL: CPT | Performed by: NURSE PRACTITIONER

## 2020-10-05 PROCEDURE — 80053 COMPREHEN METABOLIC PANEL: CPT | Performed by: NURSE PRACTITIONER

## 2020-10-05 PROCEDURE — 99232 SBSQ HOSP IP/OBS MODERATE 35: CPT | Performed by: PSYCHIATRY & NEUROLOGY

## 2020-10-05 PROCEDURE — 84443 ASSAY THYROID STIM HORMONE: CPT | Performed by: NURSE PRACTITIONER

## 2020-10-05 RX ORDER — ESCITALOPRAM OXALATE 10 MG/1
10 TABLET ORAL DAILY
Status: DISCONTINUED | OUTPATIENT
Start: 2020-10-06 | End: 2020-10-09 | Stop reason: HOSPADM

## 2020-10-05 RX ORDER — TEMAZEPAM 15 MG/1
15 CAPSULE ORAL
Status: DISCONTINUED | OUTPATIENT
Start: 2020-10-05 | End: 2020-10-09 | Stop reason: HOSPADM

## 2020-10-05 RX ADMIN — THIAMINE HCL TAB 100 MG 100 MG: 100 TAB at 08:09

## 2020-10-05 RX ADMIN — ESCITALOPRAM 5 MG: 5 TABLET, FILM COATED ORAL at 08:09

## 2020-10-05 RX ADMIN — NALTREXONE HYDROCHLORIDE 25 MG: 50 TABLET, FILM COATED ORAL at 08:09

## 2020-10-05 RX ADMIN — GABAPENTIN 300 MG: 300 CAPSULE ORAL at 16:05

## 2020-10-05 RX ADMIN — DIVALPROEX SODIUM 1750 MG: 500 TABLET, EXTENDED RELEASE ORAL at 17:17

## 2020-10-05 RX ADMIN — GABAPENTIN 300 MG: 300 CAPSULE ORAL at 08:09

## 2020-10-05 RX ADMIN — Medication 1 TABLET: at 08:09

## 2020-10-05 RX ADMIN — GABAPENTIN 300 MG: 300 CAPSULE ORAL at 21:07

## 2020-10-05 RX ADMIN — FOLIC ACID 1 MG: 1 TABLET ORAL at 08:09

## 2020-10-06 ENCOUNTER — DOCUMENTATION (OUTPATIENT)
Dept: PSYCHOLOGY | Facility: CLINIC | Age: 50
End: 2020-10-06

## 2020-10-06 LAB — VALPROATE SERPL-MCNC: 70.5 UG/ML (ref 50–125)

## 2020-10-06 PROCEDURE — 80164 ASSAY DIPROPYLACETIC ACD TOT: CPT | Performed by: PSYCHIATRY & NEUROLOGY

## 2020-10-06 PROCEDURE — 99232 SBSQ HOSP IP/OBS MODERATE 35: CPT | Performed by: PSYCHIATRY & NEUROLOGY

## 2020-10-06 RX ADMIN — GABAPENTIN 300 MG: 300 CAPSULE ORAL at 16:35

## 2020-10-06 RX ADMIN — GABAPENTIN 300 MG: 300 CAPSULE ORAL at 08:33

## 2020-10-06 RX ADMIN — ESCITALOPRAM OXALATE 10 MG: 10 TABLET ORAL at 08:34

## 2020-10-06 RX ADMIN — GABAPENTIN 300 MG: 300 CAPSULE ORAL at 21:27

## 2020-10-06 RX ADMIN — Medication 1 TABLET: at 08:34

## 2020-10-06 RX ADMIN — NALTREXONE HYDROCHLORIDE 25 MG: 50 TABLET, FILM COATED ORAL at 08:34

## 2020-10-06 RX ADMIN — THIAMINE HCL TAB 100 MG 100 MG: 100 TAB at 08:33

## 2020-10-06 RX ADMIN — FOLIC ACID 1 MG: 1 TABLET ORAL at 08:34

## 2020-10-06 RX ADMIN — DIVALPROEX SODIUM 1750 MG: 500 TABLET, EXTENDED RELEASE ORAL at 21:27

## 2020-10-07 PROCEDURE — 99232 SBSQ HOSP IP/OBS MODERATE 35: CPT | Performed by: PSYCHIATRY & NEUROLOGY

## 2020-10-07 RX ORDER — GABAPENTIN 400 MG/1
400 CAPSULE ORAL 3 TIMES DAILY
Status: DISCONTINUED | OUTPATIENT
Start: 2020-10-07 | End: 2020-10-08

## 2020-10-07 RX ADMIN — FOLIC ACID 1 MG: 1 TABLET ORAL at 08:20

## 2020-10-07 RX ADMIN — GABAPENTIN 300 MG: 300 CAPSULE ORAL at 08:20

## 2020-10-07 RX ADMIN — ESCITALOPRAM OXALATE 10 MG: 10 TABLET ORAL at 08:20

## 2020-10-07 RX ADMIN — GABAPENTIN 400 MG: 400 CAPSULE ORAL at 17:45

## 2020-10-07 RX ADMIN — THIAMINE HCL TAB 100 MG 100 MG: 100 TAB at 08:20

## 2020-10-07 RX ADMIN — NALTREXONE HYDROCHLORIDE 25 MG: 50 TABLET, FILM COATED ORAL at 08:20

## 2020-10-07 RX ADMIN — Medication 1 TABLET: at 08:20

## 2020-10-07 RX ADMIN — GABAPENTIN 400 MG: 400 CAPSULE ORAL at 21:22

## 2020-10-07 RX ADMIN — DIVALPROEX SODIUM 1750 MG: 500 TABLET, EXTENDED RELEASE ORAL at 21:20

## 2020-10-08 PROCEDURE — 99232 SBSQ HOSP IP/OBS MODERATE 35: CPT | Performed by: PSYCHIATRY & NEUROLOGY

## 2020-10-08 RX ORDER — GABAPENTIN 300 MG/1
600 CAPSULE ORAL 3 TIMES DAILY
Status: DISCONTINUED | OUTPATIENT
Start: 2020-10-08 | End: 2020-10-09 | Stop reason: HOSPADM

## 2020-10-08 RX ADMIN — GABAPENTIN 400 MG: 400 CAPSULE ORAL at 15:11

## 2020-10-08 RX ADMIN — GABAPENTIN 400 MG: 400 CAPSULE ORAL at 08:13

## 2020-10-08 RX ADMIN — DIVALPROEX SODIUM 1750 MG: 500 TABLET, EXTENDED RELEASE ORAL at 21:35

## 2020-10-08 RX ADMIN — THIAMINE HCL TAB 100 MG 100 MG: 100 TAB at 08:13

## 2020-10-08 RX ADMIN — FOLIC ACID 1 MG: 1 TABLET ORAL at 08:13

## 2020-10-08 RX ADMIN — TEMAZEPAM 15 MG: 15 CAPSULE ORAL at 21:42

## 2020-10-08 RX ADMIN — Medication 1 TABLET: at 08:13

## 2020-10-08 RX ADMIN — GABAPENTIN 600 MG: 300 CAPSULE ORAL at 21:35

## 2020-10-08 RX ADMIN — ESCITALOPRAM OXALATE 10 MG: 10 TABLET ORAL at 08:12

## 2020-10-08 RX ADMIN — NALTREXONE HYDROCHLORIDE 25 MG: 50 TABLET, FILM COATED ORAL at 08:12

## 2020-10-09 ENCOUNTER — TELEPHONE (OUTPATIENT)
Dept: PSYCHIATRY | Facility: CLINIC | Age: 50
End: 2020-10-09

## 2020-10-09 VITALS
SYSTOLIC BLOOD PRESSURE: 114 MMHG | TEMPERATURE: 97.9 F | WEIGHT: 134 LBS | OXYGEN SATURATION: 98 % | RESPIRATION RATE: 16 BRPM | DIASTOLIC BLOOD PRESSURE: 66 MMHG | HEART RATE: 60 BPM | BODY MASS INDEX: 22.88 KG/M2 | HEIGHT: 64 IN

## 2020-10-09 PROBLEM — Z00.8 MEDICAL CLEARANCE FOR PSYCHIATRIC ADMISSION: Status: RESOLVED | Noted: 2020-10-02 | Resolved: 2020-10-09

## 2020-10-09 PROBLEM — F10.10 ALCOHOL ABUSE: Status: RESOLVED | Noted: 2020-10-02 | Resolved: 2020-10-09

## 2020-10-09 PROCEDURE — 99238 HOSP IP/OBS DSCHRG MGMT 30/<: CPT | Performed by: NURSE PRACTITIONER

## 2020-10-09 RX ORDER — DIVALPROEX SODIUM 250 MG/1
1750 TABLET, EXTENDED RELEASE ORAL
Qty: 210 TABLET | Refills: 0 | Status: SHIPPED | OUTPATIENT
Start: 2020-10-09 | End: 2020-10-09

## 2020-10-09 RX ORDER — NALTREXONE HYDROCHLORIDE 50 MG/1
25 TABLET, FILM COATED ORAL DAILY
Qty: 15 TABLET | Refills: 0 | Status: SHIPPED | OUTPATIENT
Start: 2020-10-10 | End: 2020-11-02 | Stop reason: SDUPTHER

## 2020-10-09 RX ORDER — DIVALPROEX SODIUM 250 MG/1
1750 TABLET, EXTENDED RELEASE ORAL
Qty: 210 TABLET | Refills: 0 | Status: SHIPPED | OUTPATIENT
Start: 2020-10-09 | End: 2021-09-14

## 2020-10-09 RX ORDER — ESCITALOPRAM OXALATE 10 MG/1
10 TABLET ORAL DAILY
Qty: 30 TABLET | Refills: 0 | Status: SHIPPED | OUTPATIENT
Start: 2020-10-10 | End: 2020-11-02 | Stop reason: DRUGHIGH

## 2020-10-09 RX ORDER — GABAPENTIN 300 MG/1
600 CAPSULE ORAL 3 TIMES DAILY
Qty: 180 CAPSULE | Refills: 0 | Status: SHIPPED | OUTPATIENT
Start: 2020-10-09 | End: 2020-10-20 | Stop reason: SDUPTHER

## 2020-10-09 RX ADMIN — THIAMINE HCL TAB 100 MG 100 MG: 100 TAB at 08:37

## 2020-10-09 RX ADMIN — FOLIC ACID 1 MG: 1 TABLET ORAL at 08:37

## 2020-10-09 RX ADMIN — ESCITALOPRAM OXALATE 10 MG: 10 TABLET ORAL at 08:37

## 2020-10-09 RX ADMIN — GABAPENTIN 600 MG: 300 CAPSULE ORAL at 08:37

## 2020-10-09 RX ADMIN — NALTREXONE HYDROCHLORIDE 25 MG: 50 TABLET, FILM COATED ORAL at 08:37

## 2020-10-09 RX ADMIN — Medication 1 TABLET: at 08:37

## 2020-10-12 ENCOUNTER — TELEMEDICINE (OUTPATIENT)
Dept: PSYCHIATRY | Facility: CLINIC | Age: 50
End: 2020-10-12
Payer: COMMERCIAL

## 2020-10-12 ENCOUNTER — OFFICE VISIT (OUTPATIENT)
Dept: PSYCHOLOGY | Facility: CLINIC | Age: 50
End: 2020-10-12
Payer: COMMERCIAL

## 2020-10-12 DIAGNOSIS — F31.81 BIPOLAR 2 DISORDER (HCC): ICD-10-CM

## 2020-10-12 DIAGNOSIS — F31.81 BIPOLAR 2 DISORDER (HCC): Primary | ICD-10-CM

## 2020-10-12 DIAGNOSIS — F10.20 ALCOHOL USE DISORDER, SEVERE, DEPENDENCE (HCC): ICD-10-CM

## 2020-10-12 DIAGNOSIS — F10.20 ALCOHOL USE DISORDER, SEVERE, DEPENDENCE (HCC): Primary | ICD-10-CM

## 2020-10-12 PROCEDURE — 90791 PSYCH DIAGNOSTIC EVALUATION: CPT

## 2020-10-12 PROCEDURE — 90792 PSYCH DIAG EVAL W/MED SRVCS: CPT | Performed by: HOSPITALIST

## 2020-10-13 ENCOUNTER — OFFICE VISIT (OUTPATIENT)
Dept: PSYCHOLOGY | Facility: CLINIC | Age: 50
End: 2020-10-13
Payer: COMMERCIAL

## 2020-10-13 DIAGNOSIS — F31.81 BIPOLAR 2 DISORDER (HCC): Primary | ICD-10-CM

## 2020-10-13 DIAGNOSIS — F10.20 ALCOHOL USE DISORDER, SEVERE, DEPENDENCE (HCC): ICD-10-CM

## 2020-10-13 PROCEDURE — 90832 PSYTX W PT 30 MINUTES: CPT

## 2020-10-13 PROCEDURE — G0176 OPPS/PHP;ACTIVITY THERAPY: HCPCS

## 2020-10-13 PROCEDURE — G0410 GRP PSYCH PARTIAL HOSP 45-50: HCPCS

## 2020-10-13 PROCEDURE — G0177 OPPS/PHP; TRAIN & EDUC SERV: HCPCS

## 2020-10-14 ENCOUNTER — OFFICE VISIT (OUTPATIENT)
Dept: PSYCHOLOGY | Facility: CLINIC | Age: 50
End: 2020-10-14
Payer: COMMERCIAL

## 2020-10-14 DIAGNOSIS — F31.81 BIPOLAR 2 DISORDER (HCC): Primary | ICD-10-CM

## 2020-10-14 DIAGNOSIS — F10.20 ALCOHOL USE DISORDER, SEVERE, DEPENDENCE (HCC): ICD-10-CM

## 2020-10-14 PROCEDURE — G0176 OPPS/PHP;ACTIVITY THERAPY: HCPCS

## 2020-10-14 PROCEDURE — G0177 OPPS/PHP; TRAIN & EDUC SERV: HCPCS

## 2020-10-14 PROCEDURE — G0410 GRP PSYCH PARTIAL HOSP 45-50: HCPCS

## 2020-10-15 ENCOUNTER — OFFICE VISIT (OUTPATIENT)
Dept: PSYCHOLOGY | Facility: CLINIC | Age: 50
End: 2020-10-15
Payer: COMMERCIAL

## 2020-10-15 DIAGNOSIS — F31.81 BIPOLAR 2 DISORDER (HCC): Primary | ICD-10-CM

## 2020-10-15 PROCEDURE — G0410 GRP PSYCH PARTIAL HOSP 45-50: HCPCS

## 2020-10-15 PROCEDURE — G0177 OPPS/PHP; TRAIN & EDUC SERV: HCPCS

## 2020-10-15 PROCEDURE — G0176 OPPS/PHP;ACTIVITY THERAPY: HCPCS

## 2020-10-16 ENCOUNTER — OFFICE VISIT (OUTPATIENT)
Dept: PSYCHOLOGY | Facility: CLINIC | Age: 50
End: 2020-10-16
Payer: COMMERCIAL

## 2020-10-16 DIAGNOSIS — F10.20 ALCOHOL USE DISORDER, SEVERE, DEPENDENCE (HCC): ICD-10-CM

## 2020-10-16 DIAGNOSIS — F31.81 BIPOLAR 2 DISORDER (HCC): Primary | ICD-10-CM

## 2020-10-16 PROCEDURE — G0410 GRP PSYCH PARTIAL HOSP 45-50: HCPCS

## 2020-10-16 PROCEDURE — G0177 OPPS/PHP; TRAIN & EDUC SERV: HCPCS

## 2020-10-19 ENCOUNTER — OFFICE VISIT (OUTPATIENT)
Dept: PSYCHOLOGY | Facility: CLINIC | Age: 50
End: 2020-10-19
Payer: COMMERCIAL

## 2020-10-19 DIAGNOSIS — F10.20 ALCOHOL USE DISORDER, SEVERE, DEPENDENCE (HCC): ICD-10-CM

## 2020-10-19 DIAGNOSIS — F31.81 BIPOLAR 2 DISORDER (HCC): Primary | ICD-10-CM

## 2020-10-19 PROCEDURE — G0177 OPPS/PHP; TRAIN & EDUC SERV: HCPCS

## 2020-10-19 PROCEDURE — G0410 GRP PSYCH PARTIAL HOSP 45-50: HCPCS

## 2020-10-19 PROCEDURE — 90832 PSYTX W PT 30 MINUTES: CPT

## 2020-10-20 ENCOUNTER — TELEMEDICINE (OUTPATIENT)
Dept: PSYCHIATRY | Facility: CLINIC | Age: 50
End: 2020-10-20
Payer: COMMERCIAL

## 2020-10-20 ENCOUNTER — OFFICE VISIT (OUTPATIENT)
Dept: PSYCHOLOGY | Facility: CLINIC | Age: 50
End: 2020-10-20
Payer: COMMERCIAL

## 2020-10-20 DIAGNOSIS — F31.81 BIPOLAR 2 DISORDER (HCC): Primary | ICD-10-CM

## 2020-10-20 DIAGNOSIS — F31.32 BIPOLAR I DISORDER, MOST RECENT EPISODE DEPRESSED, MODERATE (HCC): ICD-10-CM

## 2020-10-20 DIAGNOSIS — F10.20 ALCOHOL USE DISORDER, SEVERE, DEPENDENCE (HCC): ICD-10-CM

## 2020-10-20 PROCEDURE — G0410 GRP PSYCH PARTIAL HOSP 45-50: HCPCS

## 2020-10-20 PROCEDURE — G0177 OPPS/PHP; TRAIN & EDUC SERV: HCPCS

## 2020-10-20 PROCEDURE — 1036F TOBACCO NON-USER: CPT | Performed by: PHYSICIAN ASSISTANT

## 2020-10-20 PROCEDURE — 99213 OFFICE O/P EST LOW 20 MIN: CPT | Performed by: PHYSICIAN ASSISTANT

## 2020-10-20 RX ORDER — GABAPENTIN 300 MG/1
CAPSULE ORAL
COMMUNITY
Start: 2020-10-20 | End: 2020-11-02 | Stop reason: SDUPTHER

## 2020-10-21 ENCOUNTER — OFFICE VISIT (OUTPATIENT)
Dept: PSYCHOLOGY | Facility: CLINIC | Age: 50
End: 2020-10-21
Payer: COMMERCIAL

## 2020-10-21 DIAGNOSIS — F31.81 BIPOLAR 2 DISORDER (HCC): Primary | ICD-10-CM

## 2020-10-21 DIAGNOSIS — F10.20 ALCOHOL USE DISORDER, SEVERE, DEPENDENCE (HCC): ICD-10-CM

## 2020-10-21 PROCEDURE — G0176 OPPS/PHP;ACTIVITY THERAPY: HCPCS

## 2020-10-21 PROCEDURE — G0410 GRP PSYCH PARTIAL HOSP 45-50: HCPCS

## 2020-10-21 PROCEDURE — 90832 PSYTX W PT 30 MINUTES: CPT

## 2020-10-21 PROCEDURE — G0177 OPPS/PHP; TRAIN & EDUC SERV: HCPCS

## 2020-10-22 ENCOUNTER — OFFICE VISIT (OUTPATIENT)
Dept: PSYCHOLOGY | Facility: CLINIC | Age: 50
End: 2020-10-22
Payer: COMMERCIAL

## 2020-10-22 DIAGNOSIS — F31.81 BIPOLAR 2 DISORDER (HCC): Primary | ICD-10-CM

## 2020-10-22 DIAGNOSIS — F10.20 ALCOHOL USE DISORDER, SEVERE, DEPENDENCE (HCC): ICD-10-CM

## 2020-10-22 PROCEDURE — G0410 GRP PSYCH PARTIAL HOSP 45-50: HCPCS

## 2020-10-22 PROCEDURE — G0177 OPPS/PHP; TRAIN & EDUC SERV: HCPCS

## 2020-10-22 PROCEDURE — 90832 PSYTX W PT 30 MINUTES: CPT

## 2020-10-22 PROCEDURE — G0176 OPPS/PHP;ACTIVITY THERAPY: HCPCS

## 2020-10-23 ENCOUNTER — OFFICE VISIT (OUTPATIENT)
Dept: PSYCHOLOGY | Facility: CLINIC | Age: 50
End: 2020-10-23
Payer: COMMERCIAL

## 2020-10-23 DIAGNOSIS — F31.81 BIPOLAR 2 DISORDER (HCC): Primary | ICD-10-CM

## 2020-10-23 DIAGNOSIS — F10.20 ALCOHOL USE DISORDER, SEVERE, DEPENDENCE (HCC): ICD-10-CM

## 2020-10-23 PROCEDURE — G0410 GRP PSYCH PARTIAL HOSP 45-50: HCPCS

## 2020-10-23 PROCEDURE — G0176 OPPS/PHP;ACTIVITY THERAPY: HCPCS

## 2020-10-23 PROCEDURE — G0177 OPPS/PHP; TRAIN & EDUC SERV: HCPCS

## 2020-10-26 ENCOUNTER — OFFICE VISIT (OUTPATIENT)
Dept: PSYCHOLOGY | Facility: CLINIC | Age: 50
End: 2020-10-26
Payer: COMMERCIAL

## 2020-10-26 ENCOUNTER — TELEMEDICINE (OUTPATIENT)
Dept: PSYCHIATRY | Facility: CLINIC | Age: 50
End: 2020-10-26
Payer: COMMERCIAL

## 2020-10-26 DIAGNOSIS — F10.20 ALCOHOL USE DISORDER, SEVERE, DEPENDENCE (HCC): ICD-10-CM

## 2020-10-26 DIAGNOSIS — F31.81 BIPOLAR 2 DISORDER (HCC): Primary | ICD-10-CM

## 2020-10-26 PROCEDURE — G0177 OPPS/PHP; TRAIN & EDUC SERV: HCPCS

## 2020-10-26 PROCEDURE — G0176 OPPS/PHP;ACTIVITY THERAPY: HCPCS

## 2020-10-26 PROCEDURE — G0410 GRP PSYCH PARTIAL HOSP 45-50: HCPCS

## 2020-10-26 PROCEDURE — 99213 OFFICE O/P EST LOW 20 MIN: CPT | Performed by: PHYSICIAN ASSISTANT

## 2020-10-27 ENCOUNTER — OFFICE VISIT (OUTPATIENT)
Dept: PSYCHOLOGY | Facility: CLINIC | Age: 50
End: 2020-10-27
Payer: COMMERCIAL

## 2020-10-27 DIAGNOSIS — F10.20 ALCOHOL USE DISORDER, SEVERE, DEPENDENCE (HCC): ICD-10-CM

## 2020-10-27 DIAGNOSIS — F31.81 BIPOLAR 2 DISORDER (HCC): Primary | ICD-10-CM

## 2020-10-27 PROCEDURE — G0410 GRP PSYCH PARTIAL HOSP 45-50: HCPCS

## 2020-10-27 PROCEDURE — G0176 OPPS/PHP;ACTIVITY THERAPY: HCPCS

## 2020-10-27 PROCEDURE — G0177 OPPS/PHP; TRAIN & EDUC SERV: HCPCS

## 2020-10-28 ENCOUNTER — OFFICE VISIT (OUTPATIENT)
Dept: PSYCHOLOGY | Facility: CLINIC | Age: 50
End: 2020-10-28
Payer: COMMERCIAL

## 2020-10-28 DIAGNOSIS — F10.20 ALCOHOL USE DISORDER, SEVERE, DEPENDENCE (HCC): ICD-10-CM

## 2020-10-28 DIAGNOSIS — F31.81 BIPOLAR 2 DISORDER (HCC): Primary | ICD-10-CM

## 2020-10-28 PROCEDURE — G0177 OPPS/PHP; TRAIN & EDUC SERV: HCPCS

## 2020-10-28 PROCEDURE — 90832 PSYTX W PT 30 MINUTES: CPT

## 2020-10-28 PROCEDURE — G0410 GRP PSYCH PARTIAL HOSP 45-50: HCPCS

## 2020-10-29 ENCOUNTER — OFFICE VISIT (OUTPATIENT)
Dept: PSYCHOLOGY | Facility: CLINIC | Age: 50
End: 2020-10-29
Payer: COMMERCIAL

## 2020-10-29 DIAGNOSIS — F31.81 BIPOLAR 2 DISORDER (HCC): Primary | ICD-10-CM

## 2020-10-29 DIAGNOSIS — F10.20 ALCOHOL USE DISORDER, SEVERE, DEPENDENCE (HCC): ICD-10-CM

## 2020-10-29 PROCEDURE — G0176 OPPS/PHP;ACTIVITY THERAPY: HCPCS

## 2020-10-29 PROCEDURE — G0410 GRP PSYCH PARTIAL HOSP 45-50: HCPCS

## 2020-10-29 PROCEDURE — G0177 OPPS/PHP; TRAIN & EDUC SERV: HCPCS

## 2020-10-30 ENCOUNTER — OFFICE VISIT (OUTPATIENT)
Dept: PSYCHOLOGY | Facility: CLINIC | Age: 50
End: 2020-10-30
Payer: COMMERCIAL

## 2020-10-30 DIAGNOSIS — F31.81 BIPOLAR 2 DISORDER (HCC): Primary | ICD-10-CM

## 2020-10-30 PROCEDURE — G0177 OPPS/PHP; TRAIN & EDUC SERV: HCPCS

## 2020-10-30 PROCEDURE — G0410 GRP PSYCH PARTIAL HOSP 45-50: HCPCS

## 2020-10-30 PROCEDURE — 90832 PSYTX W PT 30 MINUTES: CPT

## 2020-10-30 PROCEDURE — G0176 OPPS/PHP;ACTIVITY THERAPY: HCPCS

## 2020-11-01 DIAGNOSIS — F31.32 BIPOLAR I DISORDER, MOST RECENT EPISODE DEPRESSED, MODERATE (HCC): ICD-10-CM

## 2020-11-02 ENCOUNTER — TELEMEDICINE (OUTPATIENT)
Dept: PSYCHIATRY | Facility: CLINIC | Age: 50
End: 2020-11-02
Payer: COMMERCIAL

## 2020-11-02 ENCOUNTER — OFFICE VISIT (OUTPATIENT)
Dept: PSYCHOLOGY | Facility: CLINIC | Age: 50
End: 2020-11-02
Payer: COMMERCIAL

## 2020-11-02 DIAGNOSIS — F10.20 ALCOHOL USE DISORDER, SEVERE, DEPENDENCE (HCC): ICD-10-CM

## 2020-11-02 DIAGNOSIS — F31.32 BIPOLAR I DISORDER, MOST RECENT EPISODE DEPRESSED, MODERATE (HCC): ICD-10-CM

## 2020-11-02 DIAGNOSIS — Z79.899 ENCOUNTER FOR LONG-TERM (CURRENT) USE OF OTHER MEDICATIONS: Primary | ICD-10-CM

## 2020-11-02 DIAGNOSIS — F31.81 BIPOLAR 2 DISORDER (HCC): Primary | ICD-10-CM

## 2020-11-02 DIAGNOSIS — F31.81 BIPOLAR 2 DISORDER (HCC): ICD-10-CM

## 2020-11-02 PROCEDURE — G0177 OPPS/PHP; TRAIN & EDUC SERV: HCPCS

## 2020-11-02 PROCEDURE — 99213 OFFICE O/P EST LOW 20 MIN: CPT | Performed by: PHYSICIAN ASSISTANT

## 2020-11-02 PROCEDURE — G0410 GRP PSYCH PARTIAL HOSP 45-50: HCPCS

## 2020-11-02 PROCEDURE — 90832 PSYTX W PT 30 MINUTES: CPT

## 2020-11-02 RX ORDER — ESCITALOPRAM OXALATE 10 MG/1
TABLET ORAL
Qty: 30 TABLET | Refills: 0 | OUTPATIENT
Start: 2020-11-02

## 2020-11-02 RX ORDER — DIVALPROEX SODIUM 250 MG/1
TABLET, EXTENDED RELEASE ORAL
Qty: 210 TABLET | Refills: 0 | OUTPATIENT
Start: 2020-11-02

## 2020-11-02 RX ORDER — ESCITALOPRAM OXALATE 20 MG/1
20 TABLET ORAL DAILY
Qty: 30 TABLET | Refills: 0 | Status: SHIPPED | OUTPATIENT
Start: 2020-11-02

## 2020-11-02 RX ORDER — NALTREXONE HYDROCHLORIDE 50 MG/1
25 TABLET, FILM COATED ORAL DAILY
Qty: 15 TABLET | Refills: 1 | Status: SHIPPED | OUTPATIENT
Start: 2020-11-02 | End: 2021-09-14

## 2020-11-02 RX ORDER — GABAPENTIN 300 MG/1
CAPSULE ORAL
Qty: 120 CAPSULE | Refills: 1 | Status: SHIPPED | OUTPATIENT
Start: 2020-11-02

## 2020-11-03 ENCOUNTER — DOCUMENTATION (OUTPATIENT)
Dept: PSYCHOLOGY | Facility: CLINIC | Age: 50
End: 2020-11-03

## 2020-11-03 ENCOUNTER — APPOINTMENT (OUTPATIENT)
Dept: PSYCHOLOGY | Facility: CLINIC | Age: 50
End: 2020-11-03
Payer: COMMERCIAL

## 2020-11-04 ENCOUNTER — OFFICE VISIT (OUTPATIENT)
Dept: PSYCHOLOGY | Facility: CLINIC | Age: 50
End: 2020-11-04
Payer: COMMERCIAL

## 2020-11-04 DIAGNOSIS — F31.81 BIPOLAR 2 DISORDER (HCC): Primary | ICD-10-CM

## 2020-11-04 DIAGNOSIS — F10.20 ALCOHOL USE DISORDER, SEVERE, DEPENDENCE (HCC): ICD-10-CM

## 2020-11-04 PROCEDURE — G0410 GRP PSYCH PARTIAL HOSP 45-50: HCPCS

## 2020-11-04 PROCEDURE — 90832 PSYTX W PT 30 MINUTES: CPT

## 2020-11-05 ENCOUNTER — OFFICE VISIT (OUTPATIENT)
Dept: PSYCHOLOGY | Facility: CLINIC | Age: 50
End: 2020-11-05
Payer: COMMERCIAL

## 2020-11-05 DIAGNOSIS — F10.20 ALCOHOL USE DISORDER, SEVERE, DEPENDENCE (HCC): ICD-10-CM

## 2020-11-05 DIAGNOSIS — F31.81 BIPOLAR 2 DISORDER (HCC): Primary | ICD-10-CM

## 2020-11-05 PROCEDURE — G0410 GRP PSYCH PARTIAL HOSP 45-50: HCPCS

## 2020-11-05 PROCEDURE — G0177 OPPS/PHP; TRAIN & EDUC SERV: HCPCS

## 2020-11-05 PROCEDURE — G0176 OPPS/PHP;ACTIVITY THERAPY: HCPCS

## 2020-11-06 ENCOUNTER — OFFICE VISIT (OUTPATIENT)
Dept: PSYCHOLOGY | Facility: CLINIC | Age: 50
End: 2020-11-06
Payer: COMMERCIAL

## 2020-11-06 DIAGNOSIS — F31.81 BIPOLAR 2 DISORDER (HCC): Primary | ICD-10-CM

## 2020-11-06 PROCEDURE — G0177 OPPS/PHP; TRAIN & EDUC SERV: HCPCS

## 2020-11-06 PROCEDURE — G0410 GRP PSYCH PARTIAL HOSP 45-50: HCPCS

## 2020-11-09 ENCOUNTER — TELEMEDICINE (OUTPATIENT)
Dept: PSYCHIATRY | Facility: CLINIC | Age: 50
End: 2020-11-09
Payer: COMMERCIAL

## 2020-11-09 ENCOUNTER — OFFICE VISIT (OUTPATIENT)
Dept: PSYCHOLOGY | Facility: CLINIC | Age: 50
End: 2020-11-09
Payer: COMMERCIAL

## 2020-11-09 DIAGNOSIS — F31.81 BIPOLAR 2 DISORDER (HCC): Primary | ICD-10-CM

## 2020-11-09 DIAGNOSIS — F10.20 ALCOHOL USE DISORDER, SEVERE, DEPENDENCE (HCC): ICD-10-CM

## 2020-11-09 PROCEDURE — 90832 PSYTX W PT 30 MINUTES: CPT

## 2020-11-09 PROCEDURE — 99213 OFFICE O/P EST LOW 20 MIN: CPT | Performed by: PHYSICIAN ASSISTANT

## 2020-11-09 PROCEDURE — G0177 OPPS/PHP; TRAIN & EDUC SERV: HCPCS

## 2020-11-09 PROCEDURE — G0410 GRP PSYCH PARTIAL HOSP 45-50: HCPCS

## 2020-11-10 ENCOUNTER — OFFICE VISIT (OUTPATIENT)
Dept: PSYCHOLOGY | Facility: CLINIC | Age: 50
End: 2020-11-10
Payer: COMMERCIAL

## 2020-11-10 DIAGNOSIS — F10.20 ALCOHOL USE DISORDER, SEVERE, DEPENDENCE (HCC): ICD-10-CM

## 2020-11-10 DIAGNOSIS — F31.81 BIPOLAR 2 DISORDER (HCC): Primary | ICD-10-CM

## 2020-11-10 PROCEDURE — 90832 PSYTX W PT 30 MINUTES: CPT

## 2020-11-10 PROCEDURE — G0410 GRP PSYCH PARTIAL HOSP 45-50: HCPCS

## 2020-11-10 PROCEDURE — G0177 OPPS/PHP; TRAIN & EDUC SERV: HCPCS

## 2020-11-11 ENCOUNTER — APPOINTMENT (OUTPATIENT)
Dept: PSYCHOLOGY | Facility: CLINIC | Age: 50
End: 2020-11-11
Payer: COMMERCIAL

## 2020-11-12 ENCOUNTER — APPOINTMENT (OUTPATIENT)
Dept: PSYCHOLOGY | Facility: CLINIC | Age: 50
End: 2020-11-12
Payer: COMMERCIAL

## 2020-11-13 ENCOUNTER — APPOINTMENT (OUTPATIENT)
Dept: PSYCHOLOGY | Facility: CLINIC | Age: 50
End: 2020-11-13
Payer: COMMERCIAL

## 2020-11-24 ENCOUNTER — APPOINTMENT (OUTPATIENT)
Dept: LAB | Facility: HOSPITAL | Age: 50
End: 2020-11-24
Payer: COMMERCIAL

## 2020-11-26 DIAGNOSIS — F31.32 BIPOLAR I DISORDER, MOST RECENT EPISODE DEPRESSED, MODERATE (HCC): ICD-10-CM

## 2020-11-26 RX ORDER — ESCITALOPRAM OXALATE 20 MG/1
TABLET ORAL
Qty: 30 TABLET | Refills: 0 | OUTPATIENT
Start: 2020-11-26

## 2021-01-11 ENCOUNTER — OFFICE VISIT (OUTPATIENT)
Dept: URGENT CARE | Facility: CLINIC | Age: 51
End: 2021-01-11
Payer: COMMERCIAL

## 2021-01-11 VITALS — OXYGEN SATURATION: 98 % | HEART RATE: 65 BPM | TEMPERATURE: 98.1 F | RESPIRATION RATE: 18 BRPM

## 2021-01-11 DIAGNOSIS — L01.00 IMPETIGO: ICD-10-CM

## 2021-01-11 DIAGNOSIS — R53.83 FATIGUE, UNSPECIFIED TYPE: Primary | ICD-10-CM

## 2021-01-11 PROCEDURE — 99213 OFFICE O/P EST LOW 20 MIN: CPT | Performed by: PHYSICIAN ASSISTANT

## 2021-01-11 PROCEDURE — U0005 INFEC AGEN DETEC AMPLI PROBE: HCPCS | Performed by: PHYSICIAN ASSISTANT

## 2021-01-11 PROCEDURE — G0463 HOSPITAL OUTPT CLINIC VISIT: HCPCS | Performed by: PHYSICIAN ASSISTANT

## 2021-01-11 PROCEDURE — U0003 INFECTIOUS AGENT DETECTION BY NUCLEIC ACID (DNA OR RNA); SEVERE ACUTE RESPIRATORY SYNDROME CORONAVIRUS 2 (SARS-COV-2) (CORONAVIRUS DISEASE [COVID-19]), AMPLIFIED PROBE TECHNIQUE, MAKING USE OF HIGH THROUGHPUT TECHNOLOGIES AS DESCRIBED BY CMS-2020-01-R: HCPCS | Performed by: PHYSICIAN ASSISTANT

## 2021-01-11 RX ORDER — AMOXICILLIN 500 MG/1
500 TABLET, FILM COATED ORAL 3 TIMES DAILY
Qty: 21 TABLET | Refills: 0 | Status: SHIPPED | OUTPATIENT
Start: 2021-01-11 | End: 2021-01-18

## 2021-01-11 NOTE — PATIENT INSTRUCTIONS
Impetigo   AMBULATORY CARE:   Impetigo  is a skin infection caused by bacteria  The infection can cause sores to form anywhere on your body  The sores develop watery or pus-filled blisters that break and form thick crusts  Impetigo is most common in children and spreads easily from person to person  Seek care immediately if:   · You have painful, red, warm skin around the blisters  · Your face is swollen  · You urinate less than usual or there is blood in your urine  Contact your healthcare provider if:   · You have a fever  · The sores become more red, swollen, warm, or tender  · The sores do not start to heal after 3 days of treatment  · You have questions or concerns about your condition or care  Treatment for impetigo  includes antibiotics to treat the bacterial infection  Antibiotics may be given as a pill or cream  Wash your skin and gently remove any crusts before you apply the antibiotic cream   Clean your sores safely:  Wash your skin sores with antibacterial soap and water  You may need to do this 2 to 3 times each day until the sores heal  If the area is crusted, gently wash the sores with gauze or a clean washcloth to remove the crust  Pat the area dry with a clean towel  Wash your hands, the washcloth, and the towel after you clean the area around the sores  Prevent the spread of impetigo:   · Avoid direct contact  You can spread impetigo if someone touches or uses something that touched your infected skin  You can also spread impetigo on your own body when you touch the area and then touch somewhere else  Keep the sores covered with gauze so you will not scratch or touch them  Keep your fingernails short  Your child may need to wear mittens so he does not scratch his sores  · Wash your hands often  Always wash your hands after you touch the infected area  Wash your hands before you touch food, your eyes, or other people   If no water is available, use an alcohol-based gel to clean your hands  · Wash household items  Do not share or reuse items that have come in contact with impetigo sores  Examples include bedding, towels, washcloths, and eating utensils  These items may be used again after they have been washed with hot water and soap  Return to work or school: You may return to work or school 48 hours after you start the antibiotic medicine  If your child has impetigo, tell his school or  center about the infection  Follow up with your healthcare provider as directed:  Write down your questions so you remember to ask them during your visits  © Copyright [address] Information is for End User's use only and may not be sold, redistributed or otherwise used for commercial purposes  All illustrations and images included in CareNotes® are the copyrighted property of [employer]  or William Vasquez  The above information is an  only  It is not intended as medical advice for individual conditions or treatments  Talk to your doctor, nurse or pharmacist before following any medical regimen to see if it is safe and effective for you

## 2021-01-11 NOTE — PROGRESS NOTES
[address]            NAME: Gisel Bentley is a 53 y o  female  : 1970    MRN: [id number]  DATE: 2021  TIME: 10:22 AM    Assessment and Plan   Fatigue, unspecified type [R53 83]  1  Fatigue, unspecified type  Novel Coronavirus (COVID-19), PCR [facility] - Office Collection    amoxicillin (AMOXIL) 500 MG tablet    mupirocin (BACTROBAN) 2 % ointment   2  Impetigo         Patient Instructions   You can take vitamin D3 2000 IU daily, vitamin-C 1 g every 12 hours, and a daily multivitamin  Please check your sugars more frequently  Call your primary care provider and schedule a follow-up tele visit within the next 3 days  Follow CDC guidelines for self quarantine as discussed  [address]    Your healthcare provider and/or public health staff have evaluated you and have determined that you do not need to remain in the hospital at this time  At this time you can be isolated at home where you will be monitored by staff from your local or state health department  You should carefully follow the prevention and isolation steps below until a healthcare provider or local or state health department says that you can return to your normal activities  Stay home except to get medical care    People who are mildly ill with COVID-19 are able to isolate at home during their illness  You should restrict activities outside your home, except for getting medical care  Do not go to work, school, or public areas  Avoid using public transportation, ride-sharing, or taxis  Separate yourself from other people and animals in your home    People: As much as possible, you should stay in a specific room and away from other people in your home  Also, you should use a separate bathroom, if available  Animals: You should restrict contact with pets and other animals while you are sick with COVID-19, just like you would around other people   Although there have not been reports of pets or other animals becoming sick with COVID-19, it is still recommended that people sick with COVID-19 limit contact with animals until more information is known about the virus  When possible, have another member of your household care for your animals while you are sick  If you are sick with COVID-19, avoid contact with your pet, including petting, snuggling, being kissed or licked, and sharing food  If you must care for your pet or be around animals while you are sick, wash your hands before and after you interact with pets and wear a facemask  See COVID-19 and Animals for more information  Call ahead before visiting your doctor    If you have a medical appointment, call the healthcare provider and tell them that you have or may have COVID-19  This will help the healthcare providers office take steps to keep other people from getting infected or exposed  Wear a facemask    You should wear a facemask when you are around other people (e g , sharing a room or vehicle) or pets and before you enter a healthcare providers office  If you are not able to wear a facemask (for example, because it causes trouble breathing), then people who live with you should not stay in the same room with you, or they should wear a facemask if they enter your room  Cover your coughs and sneezes    Cover your mouth and nose with a tissue when you cough or sneeze  Throw used tissues in a lined trash can  Immediately wash your hands with soap and water for at least 20 seconds or, if soap and water are not available, clean your hands with an alcohol-based hand  that contains at least 60% alcohol  Clean your hands often    Wash your hands often with soap and water for at least 20 seconds, especially after blowing your nose, coughing, or sneezing; going to the bathroom; and before eating or preparing food   If soap and water are not readily available, use an alcohol-based hand  with at least 60% alcohol, covering all surfaces of your hands and rubbing them together until they feel dry  Soap and water are the best option if hands are visibly dirty  Avoid touching your eyes, nose, and mouth with unwashed hands  Avoid sharing personal household items    You should not share dishes, drinking glasses, cups, eating utensils, towels, or bedding with other people or pets in your home  After using these items, they should be washed thoroughly with soap and water  Clean all high-touch surfaces everyday    High touch surfaces include counters, tabletops, doorknobs, bathroom fixtures, toilets, phones, keyboards, tablets, and bedside tables  Also, clean any surfaces that may have blood, stool, or body fluids on them  Use a household cleaning spray or wipe, according to the label instructions  Labels contain instructions for safe and effective use of the cleaning product including precautions you should take when applying the product, such as wearing gloves and making sure you have good ventilation during use of the product  Monitor your symptoms    Seek prompt medical attention if your illness is worsening (e g , difficulty breathing)  Before seeking care, call your healthcare provider and tell them that you have, or are being evaluated for, COVID-19  Put on a facemask before you enter the facility  These steps will help the healthcare providers office to keep other people in the office or waiting room from getting infected or exposed  Ask your healthcare provider to call the local or state health department  Persons who are placed under active monitoring or facilitated self-monitoring should follow instructions provided by their local health department or occupational health professionals, as appropriate  If you have a medical emergency and need to call 911, notify the dispatch personnel that you have, or are being evaluated for COVID-19  If possible, put on a facemask before emergency medical services arrive      Discontinuing home isolation    Patients with confirmed COVID-19 should remain under home isolation precautions until the following conditions are met:   - They have had no fever for at least 24 hours (that is one full day of no fever without the use medicine that reduces fevers)  AND  - other symptoms have improved (for example, when their cough or shortness of breath have improved)  AND  - If had mild or moderate illness, at least 10 days have passed since their symptoms first appeared or if severe illness (needed oxygen) or immunosuppressed, at least 20 days have passed since symptoms first appeared  Patients with confirmed COVID-19 should also notify close contacts (including their workplace) and ask that they self-quarantine  Currently, close contact is defined as being within 6 feet for 15 minutes or more from the period 24 hours starting 48 hours before symptom onset to the time at which the patient went into isolation  Close contacts of patients diagnosed with COVID-19 should be instructed by the patient to self-quarantine for 14 days from the last time of their last contact with the patient  Source: [employer]   To present to the ER if symptoms worsen  Chief Complaint     Chief Complaint   Patient presents with    Rash     Rash on face for a week  History of Present Illness   Cecily Link presents to the clinic c/o    Rash  This is a new problem  The current episode started in the past 7 days  Location: right face  The rash is characterized by redness, swelling, burning and pain  She was exposed to nothing  Associated symptoms include coughing, fatigue and a sore throat  Pertinent negatives include no congestion, eye pain, fever or shortness of breath  Past treatments include antibiotic cream  The treatment provided mild relief  Fatigue  This is a new problem  The current episode started in the past 7 days  The problem occurs constantly  The problem has been unchanged  Associated symptoms include chills, coughing, fatigue, headaches, a rash and a sore throat  Pertinent negatives include no abdominal pain, chest pain, congestion, diaphoresis, fever or numbness  Nothing aggravates the symptoms  The treatment provided no relief  Review of Systems   Review of Systems   Constitutional: Positive for chills and fatigue  Negative for diaphoresis and fever  HENT: Positive for ear pain, sinus pressure and sore throat  Negative for congestion, ear discharge, facial swelling and sinus pain  Eyes: Negative for photophobia, pain, discharge, redness, itching and visual disturbance  Respiratory: Positive for cough  Negative for apnea, chest tightness, shortness of breath and wheezing  Cardiovascular: Negative for chest pain and palpitations  Gastrointestinal: Negative for abdominal pain  Skin: Positive for rash  Negative for color change and wound  Neurological: Positive for headaches  Negative for dizziness and numbness  Hematological: Positive for adenopathy           Current Medications     Long-Term Medications   Medication Sig Dispense Refill    divalproex sodium (DEPAKOTE ER) 250 mg 24 hr tablet Take 7 tablets (1,750 mg total) by mouth daily at bedtime 210 tablet 0    escitalopram (LEXAPRO) 20 mg tablet Take 1 tablet (20 mg total) by mouth daily 30 tablet 0    gabapentin (NEURONTIN) 300 mg capsule One po bid and 2 po q bedtime 120 capsule 1    mupirocin (BACTROBAN) 2 % ointment Apply topically 3 (three) times a day 22 g 0    naltrexone (REVIA) 50 mg tablet Take 0 5 tablets (25 mg total) by mouth daily 15 tablet 1       Current Allergies     Allergies as of 01/11/2021    (No Known Allergies)            The following portions of the patient's history were reviewed and updated as appropriate: allergies, current medications, past family history, past medical history, past social history, past surgical history and problem list   Past Medical History:   Diagnosis Date    Alcohol abuse     Allergic     Anxiety     Bipolar 1 disorder ([facility] [address] )     Depression      Past Surgical History:   Procedure Laterality Date     SECTION       Social History     Socioeconomic History    Marital status: /Civil Union     Spouse name: Not on file    Number of children: 3    Years of education: Not on file    Highest education level: Not on file   Occupational History    Occupation: Unemployed   Social Needs    Financial resource strain: Not on file    Food insecurity     Worry: Not on file     Inability: Not on file   [employer] needs     Medical: Not on file     Non-medical: Not on file   Tobacco Use    Smoking status: Never Smoker    Smokeless tobacco: Never Used   Substance and Sexual Activity    Alcohol use: Yes     Alcohol/week: 6 0 standard drinks     Types: 6 Cans of beer per week     Frequency: 2-3 times a week     Drinks per session: 5 or 6     Binge frequency: Never     Comment: pt admits to "couple times a month" drinking a six pack at a time   has friend bedside stating pt drinks multiple times a week, patient reports binge drinking    Drug use: No    Sexual activity: Yes     Partners: Male   Lifestyle    Physical activity     Days per week: Not on file     Minutes per session: Not on file    Stress: Not on file   Relationships    Social connections     Talks on phone: Not on file     Gets together: Not on file     Attends Anabaptism service: Not on file     Active member of club or organization: Not on file     Attends meetings of clubs or organizations: Not on file     Relationship status: Not on file    Intimate partner violence     Fear of current or ex partner: Not on file     Emotionally abused: Not on file     Physically abused: Not on file     Forced sexual activity: Not on file   Other Topics Concern    Not on file   Social History Narrative    Not on file       Objective   Pulse 65   Temp 98 1 °F (36 7 °C)   Resp 18   SpO2 98%      Physical Exam     Physical Exam  Vitals signs and nursing note reviewed  Constitutional:       General: She is not in acute distress  Appearance: She is well-developed  She is not diaphoretic  HENT:      Head: Normocephalic and atraumatic  Right Ear: External ear normal  Tympanic membrane is erythematous  Left Ear: Tympanic membrane and external ear normal       Nose: Nose normal       Mouth/Throat:      Pharynx: Oropharynx is clear  No pharyngeal swelling, oropharyngeal exudate or posterior oropharyngeal erythema  Tonsils: No tonsillar exudate  Eyes:      General: No scleral icterus  Right eye: No discharge  Left eye: No discharge  Conjunctiva/sclera: Conjunctivae normal    Cardiovascular:      Rate and Rhythm: Normal rate and regular rhythm  Heart sounds: Normal heart sounds  No murmur  No friction rub  No gallop  Pulmonary:      Effort: Pulmonary effort is normal  No respiratory distress  Breath sounds: Normal breath sounds  No decreased breath sounds, wheezing, rhonchi or rales  Lymphadenopathy:      Cervical: Cervical adenopathy present  Right cervical: Superficial cervical adenopathy present  Skin:     General: Skin is warm and dry  Coloration: Skin is not pale  Findings: No erythema or rash  Neurological:      Mental Status: She is alert and oriented to person, place, and time  Psychiatric:         Behavior: Behavior normal          Thought Content:  Thought content normal          Judgment: Judgment normal          Cristian Muñoz PA-C

## 2021-01-12 LAB — SARS-COV-2 RNA SPEC QL NAA+PROBE: NOT DETECTED

## 2021-01-15 ENCOUNTER — IMMUNIZATIONS (OUTPATIENT)
Dept: FAMILY MEDICINE CLINIC | Facility: CLINIC | Age: 51
End: 2021-01-15
Payer: COMMERCIAL

## 2021-01-15 DIAGNOSIS — Z23 ENCOUNTER FOR IMMUNIZATION: Primary | ICD-10-CM

## 2021-01-15 PROCEDURE — 90686 IIV4 VACC NO PRSV 0.5 ML IM: CPT

## 2021-01-15 PROCEDURE — G0008 ADMIN INFLUENZA VIRUS VAC: HCPCS

## 2021-02-05 ENCOUNTER — TELEPHONE (OUTPATIENT)
Dept: OTHER | Facility: OTHER | Age: 51
End: 2021-02-05

## 2021-02-05 NOTE — TELEPHONE ENCOUNTER
C [x] 2/05/21 9:00  [address] [[id number]] [facility] [[id number]] AWV     Please call patient back to reschedule

## 2021-02-15 ENCOUNTER — LAB (OUTPATIENT)
Dept: LAB | Facility: CLINIC | Age: 51
End: 2021-02-15
Payer: COMMERCIAL

## 2021-02-15 ENCOUNTER — TRANSCRIBE ORDERS (OUTPATIENT)
Dept: ADMINISTRATIVE | Facility: HOSPITAL | Age: 51
End: 2021-02-15

## 2021-02-15 ENCOUNTER — OFFICE VISIT (OUTPATIENT)
Dept: FAMILY MEDICINE CLINIC | Facility: CLINIC | Age: 51
End: 2021-02-15
Payer: COMMERCIAL

## 2021-02-15 VITALS
BODY MASS INDEX: 25.44 KG/M2 | TEMPERATURE: 98.3 F | OXYGEN SATURATION: 96 % | DIASTOLIC BLOOD PRESSURE: 82 MMHG | WEIGHT: 149 LBS | HEIGHT: 64 IN | HEART RATE: 83 BPM | SYSTOLIC BLOOD PRESSURE: 130 MMHG

## 2021-02-15 DIAGNOSIS — R53.83 FATIGUE, UNSPECIFIED TYPE: ICD-10-CM

## 2021-02-15 DIAGNOSIS — Z12.31 ENCOUNTER FOR SCREENING MAMMOGRAM FOR MALIGNANT NEOPLASM OF BREAST: Primary | ICD-10-CM

## 2021-02-15 DIAGNOSIS — Z13.6 ENCOUNTER FOR SCREENING FOR CARDIOVASCULAR DISORDERS: ICD-10-CM

## 2021-02-15 DIAGNOSIS — I83.813 VARICOSE VEINS OF BOTH LOWER EXTREMITIES WITH PAIN: ICD-10-CM

## 2021-02-15 DIAGNOSIS — Z13.220 SCREENING FOR HYPERLIPIDEMIA: ICD-10-CM

## 2021-02-15 DIAGNOSIS — F31.81 BIPOLAR 2 DISORDER (HCC): ICD-10-CM

## 2021-02-15 DIAGNOSIS — Z12.4 SCREENING FOR CERVICAL CANCER: ICD-10-CM

## 2021-02-15 DIAGNOSIS — Z11.4 SCREENING FOR HIV WITHOUT PRESENCE OF RISK FACTORS: ICD-10-CM

## 2021-02-15 DIAGNOSIS — F10.20 ALCOHOL USE DISORDER, SEVERE, DEPENDENCE (HCC): ICD-10-CM

## 2021-02-15 DIAGNOSIS — Z79.899 ENCOUNTER FOR LONG-TERM (CURRENT) USE OF OTHER MEDICATIONS: ICD-10-CM

## 2021-02-15 DIAGNOSIS — N92.0 MENORRHAGIA WITH REGULAR CYCLE: ICD-10-CM

## 2021-02-15 DIAGNOSIS — Z79.899 ENCOUNTER FOR LONG-TERM (CURRENT) USE OF OTHER MEDICATIONS: Primary | ICD-10-CM

## 2021-02-15 LAB
25(OH)D3 SERPL-MCNC: 49.7 NG/ML (ref 30–100)
ALBUMIN SERPL BCP-MCNC: 3.5 G/DL (ref 3.5–5)
ALP SERPL-CCNC: 31 U/L (ref 46–116)
ALT SERPL W P-5'-P-CCNC: 27 U/L (ref 12–78)
ANION GAP SERPL CALCULATED.3IONS-SCNC: 5 MMOL/L (ref 4–13)
AST SERPL W P-5'-P-CCNC: 19 U/L (ref 5–45)
BASOPHILS # BLD AUTO: 0.06 THOUSANDS/ΜL (ref 0–0.1)
BASOPHILS NFR BLD AUTO: 1 % (ref 0–1)
BILIRUB SERPL-MCNC: 1.27 MG/DL (ref 0.2–1)
BUN SERPL-MCNC: 12 MG/DL (ref 5–25)
CALCIUM SERPL-MCNC: 8.9 MG/DL (ref 8.3–10.1)
CHLORIDE SERPL-SCNC: 102 MMOL/L (ref 100–108)
CHOLEST SERPL-MCNC: 201 MG/DL (ref 50–200)
CO2 SERPL-SCNC: 28 MMOL/L (ref 21–32)
CREAT SERPL-MCNC: 0.63 MG/DL (ref 0.6–1.3)
EOSINOPHIL # BLD AUTO: 0.04 THOUSAND/ΜL (ref 0–0.61)
EOSINOPHIL NFR BLD AUTO: 0 % (ref 0–6)
ERYTHROCYTE [DISTWIDTH] IN BLOOD BY AUTOMATED COUNT: 12.1 % (ref 11.6–15.1)
FOLATE SERPL-MCNC: >20 NG/ML (ref 3.1–17.5)
GFR SERPL CREATININE-BSD FRML MDRD: 106 ML/MIN/1.73SQ M
GLUCOSE P FAST SERPL-MCNC: 94 MG/DL (ref 65–99)
HCT VFR BLD AUTO: 42 % (ref 34.8–46.1)
HDLC SERPL-MCNC: 80 MG/DL
HGB BLD-MCNC: 13.5 G/DL (ref 11.5–15.4)
IMM GRANULOCYTES # BLD AUTO: 0.03 THOUSAND/UL (ref 0–0.2)
IMM GRANULOCYTES NFR BLD AUTO: 0 % (ref 0–2)
LDLC SERPL CALC-MCNC: 107 MG/DL (ref 0–100)
LYMPHOCYTES # BLD AUTO: 2.77 THOUSANDS/ΜL (ref 0.6–4.47)
LYMPHOCYTES NFR BLD AUTO: 29 % (ref 14–44)
MCH RBC QN AUTO: 29.6 PG (ref 26.8–34.3)
MCHC RBC AUTO-ENTMCNC: 32.1 G/DL (ref 31.4–37.4)
MCV RBC AUTO: 92 FL (ref 82–98)
MONOCYTES # BLD AUTO: 0.91 THOUSAND/ΜL (ref 0.17–1.22)
MONOCYTES NFR BLD AUTO: 10 % (ref 4–12)
NEUTROPHILS # BLD AUTO: 5.78 THOUSANDS/ΜL (ref 1.85–7.62)
NEUTS SEG NFR BLD AUTO: 60 % (ref 43–75)
NONHDLC SERPL-MCNC: 121 MG/DL
NRBC BLD AUTO-RTO: 0 /100 WBCS
PLATELET # BLD AUTO: 267 THOUSANDS/UL (ref 149–390)
PMV BLD AUTO: 11.6 FL (ref 8.9–12.7)
POTASSIUM SERPL-SCNC: 4.5 MMOL/L (ref 3.5–5.3)
PROT SERPL-MCNC: 6.7 G/DL (ref 6.4–8.2)
RBC # BLD AUTO: 4.56 MILLION/UL (ref 3.81–5.12)
SODIUM SERPL-SCNC: 135 MMOL/L (ref 136–145)
TRIGL SERPL-MCNC: 72 MG/DL
TSH SERPL DL<=0.05 MIU/L-ACNC: 0.95 UIU/ML (ref 0.36–3.74)
VIT B12 SERPL-MCNC: 566 PG/ML (ref 100–900)
WBC # BLD AUTO: 9.59 THOUSAND/UL (ref 4.31–10.16)

## 2021-02-15 PROCEDURE — 82306 VITAMIN D 25 HYDROXY: CPT

## 2021-02-15 PROCEDURE — 85025 COMPLETE CBC W/AUTO DIFF WBC: CPT

## 2021-02-15 PROCEDURE — 36415 COLL VENOUS BLD VENIPUNCTURE: CPT

## 2021-02-15 PROCEDURE — 82746 ASSAY OF FOLIC ACID SERUM: CPT

## 2021-02-15 PROCEDURE — 84443 ASSAY THYROID STIM HORMONE: CPT

## 2021-02-15 PROCEDURE — 82607 VITAMIN B-12: CPT

## 2021-02-15 PROCEDURE — 80061 LIPID PANEL: CPT

## 2021-02-15 PROCEDURE — 80053 COMPREHEN METABOLIC PANEL: CPT

## 2021-02-15 PROCEDURE — 87389 HIV-1 AG W/HIV-1&-2 AB AG IA: CPT

## 2021-02-15 PROCEDURE — 99213 OFFICE O/P EST LOW 20 MIN: CPT | Performed by: NURSE PRACTITIONER

## 2021-02-15 PROCEDURE — 3725F SCREEN DEPRESSION PERFORMED: CPT | Performed by: NURSE PRACTITIONER

## 2021-02-15 PROCEDURE — G0439 PPPS, SUBSEQ VISIT: HCPCS | Performed by: NURSE PRACTITIONER

## 2021-02-15 RX ORDER — ETONOGESTREL AND ETHINYL ESTRADIOL .12; .015 MG/D; MG/D
RING VAGINAL
COMMUNITY
Start: 2021-01-21 | End: 2021-06-29

## 2021-02-15 NOTE — PATIENT INSTRUCTIONS
Medicare Preventive Visit Patient Instructions  Thank you for completing your Welcome to Medicare Visit or Medicare Annual Wellness Visit today  Your next wellness visit will be due in one year (2/15/2022)  The screening/preventive services that you may require over the next 5-10 years are detailed below  Some tests may not apply to you based off risk factors and/or age  Screening tests ordered at today's visit but not completed yet may show as past due  Also, please note that scanned in results may not display below  Preventive Screenings:  Service Recommendations Previous Testing/Comments   Colorectal Cancer Screening  * Colonoscopy    * Fecal Occult Blood Test (FOBT)/Fecal Immunochemical Test (FIT)  * Fecal DNA/Cologuard Test  * Flexible Sigmoidoscopy Age: 54-65 years old   Colonoscopy: every 10 years (may be performed more frequently if at higher risk)  OR  FOBT/FIT: every 1 year  OR  Cologuard: every 3 years  OR  Sigmoidoscopy: every 5 years  Screening may be recommended earlier than age 48 if at higher risk for colorectal cancer  Also, an individualized decision between you and your healthcare provider will decide whether screening between the ages of 74-80 would be appropriate  Colonoscopy: Not on file  FOBT/FIT: Not on file  Cologuard: Not on file  Sigmoidoscopy: Not on file         Breast Cancer Screening Age: 36 years old  Frequency: every 1-2 years  Not required if history of left and right mastectomy Mammogram: 02/03/2017       Cervical Cancer Screening Between the ages of 21-29, pap smear recommended once every 3 years  Between the ages of 33-67, can perform pap smear with HPV co-testing every 5 years     Recommendations may differ for women with a history of total hysterectomy, cervical cancer, or abnormal pap smears in past  Pap Smear: Not on file       Hepatitis C Screening Once for adults born between [facility]  More frequently in patients at high risk for Hepatitis C Hep C Antibody: Not on file       Diabetes Screening 1-2 times per year if you're at risk for diabetes or have pre-diabetes Fasting glucose: 97 mg/dL   A1C: No results in last 5 years    Screening Current   Cholesterol Screening Once every 5 years if you don't have a lipid disorder  May order more often based on risk factors  Lipid panel: 11/24/2020    Screening Current     Other Preventive Screenings Covered by Medicare:  1  Abdominal Aortic Aneurysm (AAA) Screening: covered once if your at risk  You're considered to be at risk if you have a family history of AAA  2  Lung Cancer Screening: covers low dose CT scan once per year if you meet all of the following conditions: (1) Age 50-69; (2) No signs or symptoms of lung cancer; (3) Current smoker or have quit smoking within the last 15 years; (4) You have a tobacco smoking history of at least 30 pack years (packs per day multiplied by number of years you smoked); (5) You get a written order from a healthcare provider  3  Glaucoma Screening: covered annually if you're considered high risk: (1) You have diabetes OR (2) Family history of glaucoma OR (3)  aged 48 and older OR (3)  American aged 72 and older  3  Osteoporosis Screening: covered every 2 years if you meet one of the following conditions: (1) You're estrogen deficient and at risk for osteoporosis based off medical history and other findings; (2) Have a vertebral abnormality; (3) On glucocorticoid therapy for more than 3 months; (4) Have primary hyperparathyroidism; (5) On osteoporosis medications and need to assess response to drug therapy  · Last bone density test (DXA Scan): Not on file  5  HIV Screening: covered annually if you're between the age of 12-76  Also covered annually if you are younger than 13 and older than 72 with risk factors for HIV infection  For pregnant patients, it is covered up to 3 times per pregnancy      Immunizations:  Immunization Recommendations   Influenza Vaccine Annual influenza vaccination during flu season is recommended for all persons aged >= 6 months who do not have contraindications   Pneumococcal Vaccine (Prevnar and Pneumovax)  * Prevnar = PCV13  * Pneumovax = PPSV23   Adults 25-60 years old: 1-3 doses may be recommended based on certain risk factors  Adults 72 years old: Prevnar (PCV13) vaccine recommended followed by Pneumovax (PPSV23) vaccine  If already received PPSV23 since turning 65, then PCV13 recommended at least one year after PPSV23 dose  Hepatitis B Vaccine 3 dose series if at intermediate or high risk (ex: diabetes, end stage renal disease, liver disease)   Tetanus (Td) Vaccine - COST NOT COVERED BY MEDICARE PART B Following completion of primary series, a booster dose should be given every 10 years to maintain immunity against tetanus  Td may also be given as tetanus wound prophylaxis  Tdap Vaccine - COST NOT COVERED BY MEDICARE PART B Recommended at least once for all adults  For pregnant patients, recommended with each pregnancy  Shingles Vaccine (Shingrix) - COST NOT COVERED BY MEDICARE PART B  2 shot series recommended in those aged 48 and above     Health Maintenance Due:      Topic Date Due    HIV Screening  04/03/1986    Cervical Cancer Screening  04/03/1992    MAMMOGRAM  02/03/2018     Immunizations Due:      Topic Date Due    Pneumococcal Vaccine: Pediatrics (0 to 5 Years) and At-Risk Patients (6 to 59 Years) (1 of 1 - PPSV23) 04/03/1977    DTaP,Tdap,and Td Vaccines (1 - Tdap) 04/03/1992     Advance Directives   What are advance directives? Advance directives are legal documents that state your wishes and plans for medical care  These plans are made ahead of time in case you lose your ability to make decisions for yourself  Advance directives can apply to any medical decision, such as the treatments you want, and if you want to donate organs  What are the types of advance directives?   There are many types of advance directives, and each state has rules about how to use them  You may choose a combination of any of the following:  · Living will: This is a written record of the treatment you want  You can also choose which treatments you do not want, which to limit, and which to stop at a certain time  This includes surgery, medicine, IV fluid, and tube feedings  · Durable power of  for healthcare [facility]): This is a written record that states who you want to make healthcare choices for you when you are unable to make them for yourself  This person, called a proxy, is usually a family member or a friend  You may choose more than 1 proxy  · Do not resuscitate (DNR) order:  A DNR order is used in case your heart stops beating or you stop breathing  It is a request not to have certain forms of treatment, such as CPR  A DNR order may be included in other types of advance directives  · Medical directive: This covers the care that you want if you are in a coma, near death, or unable to make decisions for yourself  You can list the treatments you want for each condition  Treatment may include pain medicine, surgery, blood transfusions, dialysis, IV or tube feedings, and a ventilator (breathing machine)  · Values history: This document has questions about your views, beliefs, and how you feel and think about life  This information can help others choose the care that you would choose  Why are advance directives important? An advance directive helps you control your care  Although spoken wishes may be used, it is better to have your wishes written down  Spoken wishes can be misunderstood, or not followed  Treatments may be given even if you do not want them  An advance directive may make it easier for your family to make difficult choices about your care     Weight Management   Why it is important to manage your weight:  Being overweight increases your risk of health conditions such as heart disease, high blood pressure, type 2 diabetes, and certain types of cancer  It can also increase your risk for osteoarthritis, sleep apnea, and other respiratory problems  Aim for a slow, steady weight loss  Even a small amount of weight loss can lower your risk of health problems  How to lose weight safely:  A safe and healthy way to lose weight is to eat fewer calories and get regular exercise  You can lose up about 1 pound a week by decreasing the number of calories you eat by 500 calories each day  Healthy meal plan for weight management:  A healthy meal plan includes a variety of foods, contains fewer calories, and helps you stay healthy  A healthy meal plan includes the following:  · Eat whole-grain foods more often  A healthy meal plan should contain fiber  Fiber is the part of grains, fruits, and vegetables that is not broken down by your body  Whole-grain foods are healthy and provide extra fiber in your diet  Some examples of whole-grain foods are whole-wheat breads and pastas, oatmeal, brown rice, and bulgur  · Eat a variety of vegetables every day  Include dark, leafy greens such as spinach, kale, oleg greens, and mustard greens  Eat yellow and orange vegetables such as carrots, sweet potatoes, and winter squash  · Eat a variety of fruits every day  Choose fresh or canned fruit (canned in its own juice or light syrup) instead of juice  Fruit juice has very little or no fiber  · Eat low-fat dairy foods  Drink fat-free (skim) milk or 1% milk  Eat fat-free yogurt and low-fat cottage cheese  Try low-fat cheeses such as mozzarella and other reduced-fat cheeses  · Choose meat and other protein foods that are low in fat  Choose beans or other legumes such as split peas or lentils  Choose fish, skinless poultry (chicken or turkey), or lean cuts of red meat (beef or pork)  Before you cook meat or poultry, cut off any visible fat  · Use less fat and oil  Try baking foods instead of frying them   Add less fat, such as margarine, sour cream, regular salad dressing and mayonnaise to foods  Eat fewer high-fat foods  Some examples of high-fat foods include french fries, doughnuts, ice cream, and cakes  · Eat fewer sweets  Limit foods and drinks that are high in sugar  This includes candy, cookies, regular soda, and sweetened drinks  Exercise:  Exercise at least 30 minutes per day on most days of the week  Some examples of exercise include walking, biking, dancing, and swimming  You can also fit in more physical activity by taking the stairs instead of the elevator or parking farther away from stores  Ask your healthcare provider about the best exercise plan for you  © Copyright [employer] 2018 Information is for End User's use only and may not be sold, redistributed or otherwise used for commercial purposes   All illustrations and images included in CareNotes® are the copyrighted property of A D A M , Inc  or [address] [place] [address]

## 2021-02-15 NOTE — PROGRESS NOTES
Assessment and Plan:     Problem List Items Addressed This Visit     None      Visit Diagnoses     Encounter for screening mammogram for malignant neoplasm of breast    -  Primary    Screening for cervical cancer               Preventive health issues were discussed with patient, and age appropriate screening tests were ordered as noted in patient's After Visit Summary  Personalized health advice and appropriate referrals for health education or preventive services given if needed, as noted in patient's After Visit Summary  History of Present Illness:     Patient presents for Medicare Annual Wellness visit    Patient Care Team:  SARAH Sepulveda as PCP - General     Problem List:     Patient Active Problem List   Diagnosis    Bipolar 2 disorder ([facility] )    Fatigue    Menorrhagia with regular cycle    Intrinsic eczema    Alcohol use disorder, severe, dependence ([facility] )      Past Medical and Surgical History:     Past Medical History:   Diagnosis Date    Alcohol abuse     Allergic     Anxiety     Bipolar 1 disorder ([place] [address] )     Depression      Past Surgical History:   Procedure Laterality Date     SECTION        Family History:     Family History   Problem Relation Age of Onset    No Known Problems Mother     No Known Problems Father       Social History:        Social History     Socioeconomic History    Marital status: /Civil Union     Spouse name: None    Number of children: 3    Years of education: None    Highest education level: None   Occupational History    Occupation: Unemployed   Social Needs    Financial resource strain: None    Food insecurity     Worry: None     Inability: None    Transportation needs     Medical: None     Non-medical: None   Tobacco Use    Smoking status: Never Smoker    Smokeless tobacco: Never Used   Substance and Sexual Activity    Alcohol use:  Yes     Alcohol/week: 6 0 standard drinks     Types: 6 Cans of beer per week     Frequency: 2-3 times a week     Drinks per session: 5 or 6     Binge frequency: Never     Comment: pt admits to "couple times a month" drinking a six pack at a time  has friend bedside stating pt drinks multiple times a week, patient reports binge drinking    Drug use: No    Sexual activity: Yes     Partners: Male   Lifestyle    Physical activity     Days per week: None     Minutes per session: None    Stress: None   Relationships    Social connections     Talks on phone: None     Gets together: None     Attends Baptism service: None     Active member of club or organization: None     Attends meetings of clubs or organizations: None     Relationship status: None    Intimate partner violence     Fear of current or ex partner: None     Emotionally abused: None     Physically abused: None     Forced sexual activity: None   Other Topics Concern    None   Social History Narrative    None      Medications and Allergies:     Current Outpatient Medications   Medication Sig Dispense Refill    divalproex sodium (DEPAKOTE ER) 250 mg 24 hr tablet Take 7 tablets (1,750 mg total) by mouth daily at bedtime 210 tablet 0    EluRyng 0 12-0 015 MG/24HR vaginal ring Use as directed      escitalopram (LEXAPRO) 20 mg tablet Take 1 tablet (20 mg total) by mouth daily 30 tablet 0    gabapentin (NEURONTIN) 300 mg capsule One po bid and 2 po q bedtime 120 capsule 1    naltrexone (REVIA) 50 mg tablet Take 0 5 tablets (25 mg total) by mouth daily 15 tablet 1    mupirocin (BACTROBAN) 2 % ointment Apply topically 3 (three) times a day (Patient not taking: Reported on 2/15/2021) 22 g 0     No current facility-administered medications for this visit        No Known Allergies   Immunizations:     Immunization History   Administered Date(s) Administered    Hep B, adult 01/01/1998    Influenza, injectable, quadrivalent, preservative free 0 5 mL 01/15/2021      Health Maintenance:         Topic Date Due    HIV Screening  04/03/1986    Cervical Cancer Screening  04/03/1992    MAMMOGRAM  02/03/2018         Topic Date Due    Pneumococcal Vaccine: Pediatrics (0 to 5 Years) and At-Risk Patients (6 to 59 Years) (1 of 1 - PPSV23) 04/03/1977    DTaP,Tdap,and Td Vaccines (1 - Tdap) 04/03/1992      Medicare Health Risk Assessment:     /82 (BP Location: Left arm, Patient Position: Sitting, Cuff Size: Standard)   Pulse 83   Temp 98 3 °F (36 8 °C)   Ht 5' 4" (1 626 m)   Wt 67 6 kg (149 lb)   LMP 02/08/2021   SpO2 96%   BMI 25 58 kg/m²      Gary Roberts is here for her Subsequent Wellness visit  Last Medicare Wellness visit information reviewed, patient interviewed and updates made to the record today  Health Risk Assessment:   Patient rates overall health as fair  Patient feels that their physical health rating is same  Eyesight was rated as slightly worse  Hearing was rated as slightly worse  Patient feels that their emotional and mental health rating is slightly better  Pain experienced in the last 7 days has been some  Patient's pain rating has been 8/10  Patient states that she has experienced no weight loss or gain in last 6 months  Depression Screening:   PHQ-2 Score: 3  PHQ-9 Score: 15      Fall Risk Screening: In the past year, patient has experienced: no history of falling in past year      Urinary Incontinence Screening:   Patient has not leaked urine accidently in the last six months  Home Safety:  Patient does not have trouble with stairs inside or outside of their home  Patient has working smoke alarms and has working carbon monoxide detector  Home safety hazards include: none  Nutrition:   Current diet is Regular  Medications:   Patient is not currently taking any over-the-counter supplements  Patient is able to manage medications       Activities of Daily Living (ADLs)/Instrumental Activities of Daily Living (IADLs):   Walk and transfer into and out of bed and chair?: Yes  Dress and groom yourself?: Yes    Bathe or shower yourself?: Yes    Feed yourself? Yes  Do your laundry/housekeeping?: Yes  Manage your money, pay your bills and track your expenses?: Yes  Make your own meals?: Yes    Do your own shopping?: Yes    Previous Hospitalizations:   Any hospitalizations or ED visits within the last 12 months?: No      Advance Care Planning:   Living will: Yes    Durable POA for healthcare:  Yes    Advanced directive: Yes    Five wishes given: No      PREVENTIVE SCREENINGS      Cardiovascular Screening:    General: Screening Current      Diabetes Screening:     General: Screening Current      Lung Cancer Screening:     General: Screening Not Indicated      Reford SARAH Manuel

## 2021-02-15 NOTE — PROGRESS NOTES
Assessment/Plan:    No problem-specific Assessment & Plan notes found for this encounter  Diagnoses and all orders for this visit:    Encounter for screening mammogram for malignant neoplasm of breast  -     Mammo screening bilateral w 3d & cad; Future    Screening for cervical cancer  -     Ambulatory referral to Obstetrics / Gynecology; Future    Fatigue, unspecified type  -     Comprehensive metabolic panel; Future  -     Vitamin D 25 hydroxy; Future    Varicose veins of both lower extremities with pain  -     Ambulatory referral to Vascular Surgery; Future    Alcohol use disorder, severe, dependence ([facility] )    Bipolar 2 disorder ([facility] )    Other orders  -     EluRyng 0 12-0 015 MG/24HR vaginal ring; Use as directed  -     Cancel: Liquid-based pap, screening ([facility]); Future  -     Cancel: HPV High Risk ([facility]); Future  -     Cancel: IGP, Aptima HPV, Rfx 16/18,45; Future  -     Cancel: IGP, Aptima HPV; Future  -     Cancel: IGP, rfx Aptima HPV ASCU; Future  -     Cancel: Thinprep Tis Pap Reflex HPV mRNA E6/E7, Chlamydia/N gonorrhoeae ([facility]); Future  -     Cancel: Thinprep Pap and HR HPV DNA ([facility]); Future          Subjective:      Patient ID: Yuki Cee is a 53 y o  female  Weight gain- states that she has gained 8 pounds since the winter started, States that she is not exercises  Patient counseled on diet and exercise     Depression- patient states that she is still having intermittent issues with her depression  She was admitted in October for depression/HI  Patient currently denies SI/HI  She is following with [facility] at this time  She was asking if we could adjust her medications as she is also having difficulty sleeping at night  She did take Restoril in the past for this issues  She does have a follow up appointment with her psychiatrist and was encouraged to talk to them about adjusting her medications as they are prescribing them for her   Will check regular labs to r/o any other causes of her fatigue/sleep irregularity  She has difficulty falling asleep at night  Sleep hygiene reviewed with patient and will try taking melatonin to help sleep at night  Varicose Veins- left leg, increasing in size and painful- will  F/u with vascular  The following portions of the patient's history were reviewed and updated as appropriate: allergies, current medications, past family history, past medical history, past social history, past surgical history and problem list     Review of Systems   Constitutional: Positive for fatigue and unexpected weight change  Negative for appetite change, diaphoresis and fever  HENT: Negative for congestion, rhinorrhea, sneezing and sore throat  Eyes: Negative for visual disturbance  Respiratory: Negative for cough, chest tightness, shortness of breath and wheezing  Cardiovascular: Negative for chest pain, palpitations and leg swelling  Gastrointestinal: Negative for abdominal pain, blood in stool, constipation, diarrhea, nausea and vomiting  Genitourinary: Negative for difficulty urinating, dysuria and urgency  Musculoskeletal: Negative for arthralgias, back pain and joint swelling  Skin: Negative for color change and rash  Neurological: Negative for dizziness, weakness and headaches  Hematological: Negative for adenopathy  Does not bruise/bleed easily  Psychiatric/Behavioral: Positive for sleep disturbance  Negative for behavioral problems, confusion, decreased concentration, self-injury and suicidal ideas  The patient is nervous/anxious  Objective:      /82 (BP Location: Left arm, Patient Position: Sitting, Cuff Size: Standard)   Pulse 83   Temp 98 3 °F (36 8 °C)   Ht 5' 4" (1 626 m)   Wt 67 6 kg (149 lb)   LMP 02/08/2021   SpO2 96%   BMI 25 58 kg/m²          Physical Exam  Constitutional:       General: She is not in acute distress  Appearance: Normal appearance  She is normal weight  She is not ill-appearing     HENT: Head: Normocephalic and atraumatic  Right Ear: Tympanic membrane, ear canal and external ear normal       Left Ear: Tympanic membrane, ear canal and external ear normal    Eyes:      Conjunctiva/sclera: Conjunctivae normal       Pupils: Pupils are equal, round, and reactive to light  Cardiovascular:      Rate and Rhythm: Normal rate and regular rhythm  Pulses: Normal pulses  Heart sounds: Normal heart sounds  No murmur  No friction rub  Pulmonary:      Effort: Pulmonary effort is normal  No respiratory distress  Breath sounds: Normal breath sounds  No stridor  Musculoskeletal: Normal range of motion  Legs:    Skin:     General: Skin is warm and dry  Capillary Refill: Capillary refill takes less than 2 seconds  Neurological:      General: No focal deficit present  Mental Status: She is alert and oriented to person, place, and time  Mental status is at baseline  Cranial Nerves: No cranial nerve deficit  Sensory: No sensory deficit  Psychiatric:         Mood and Affect: Mood normal          Behavior: Behavior normal          Thought Content: Thought content normal          Judgment: Judgment normal          BMI Counseling: Body mass index is 25 58 kg/m²  The BMI is above normal  Nutrition recommendations include reducing portion sizes, decreasing overall calorie intake, 3-5 servings of fruits/vegetables daily, increasing intake of lean protein, reducing intake of saturated fat and trans fat and reducing intake of cholesterol  Exercise recommendations include moderate aerobic physical activity for 150 minutes/week  Depression Screening and Follow-up Plan: Patient's depression screening was positive with a PHQ-2 score of 3  Their PHQ-9 score was 15  Continue regular follow-up with their mental health provider who is managing their mental health condition(s)

## 2021-02-16 LAB — HIV 1+2 AB+HIV1 P24 AG SERPL QL IA: NORMAL

## 2021-02-26 ENCOUNTER — CONSULT (OUTPATIENT)
Dept: VASCULAR SURGERY | Facility: CLINIC | Age: 51
End: 2021-02-26
Payer: COMMERCIAL

## 2021-02-26 VITALS
WEIGHT: 147 LBS | DIASTOLIC BLOOD PRESSURE: 76 MMHG | HEART RATE: 68 BPM | BODY MASS INDEX: 25.1 KG/M2 | HEIGHT: 64 IN | TEMPERATURE: 98.2 F | SYSTOLIC BLOOD PRESSURE: 110 MMHG

## 2021-02-26 DIAGNOSIS — I83.813 VARICOSE VEINS OF BOTH LOWER EXTREMITIES WITH PAIN: ICD-10-CM

## 2021-02-26 DIAGNOSIS — I83.812 VARICOSE VEINS OF LEFT LOWER EXTREMITY WITH PAIN: Primary | ICD-10-CM

## 2021-02-26 DIAGNOSIS — F31.81 BIPOLAR 2 DISORDER (HCC): ICD-10-CM

## 2021-02-26 PROCEDURE — 1036F TOBACCO NON-USER: CPT | Performed by: PHYSICIAN ASSISTANT

## 2021-02-26 PROCEDURE — 99204 OFFICE O/P NEW MOD 45 MIN: CPT | Performed by: PHYSICIAN ASSISTANT

## 2021-02-26 PROCEDURE — 3008F BODY MASS INDEX DOCD: CPT | Performed by: PHYSICIAN ASSISTANT

## 2021-02-26 NOTE — PATIENT INSTRUCTIONS
Varicose Veins   WHAT YOU NEED TO KNOW:   What are varicose veins? Varicose veins are veins that become large, twisted, and swollen  They are common on the back of the calves, knees, and thighs  Varicose veins are caused by valves in your veins that do not work properly  This causes blood to collect and increase pressure in the veins of your legs  The increased pressure causes your veins to stretch, get larger, swell, and twist        What increases my risk for varicose veins? · Pregnancy    · A family history of varicose veins    · Being overweight or obese    · Age 48 years or older    · Sitting or standing for long periods of time    · Wearing tight clothing  What are the signs and symptoms of varicose veins? Your symptoms may be worse after you stand or sit for long periods of time  You may have any of the following:  · Blue, purple, or bulging veins in your legs     · Pain, swelling, or muscle cramps in your legs    · Feeling of fatigue or heaviness in your legs  How are varicose veins diagnosed? Your healthcare provider will examine your legs and ask about your medical history  You may need tests, such as a Doppler ultrasound or duplex scan  These tests show your veins and valves, and how your blood is flowing through them  These tests may also show if there is a blockage or blood clot  How are varicose veins treated? The goal of treatment is to decrease symptoms, improve appearance, and prevent further problems  Treatment will depend on which veins are affected and how severe your condition is  You may need procedures to treat or remove your varicose veins  For example, your healthcare provider may inject a solution or use a laser to close the varicose veins  Surgery to remove long veins may also be done  Ask your healthcare provider for more information about procedures used to treat varicose veins  What can I do to manage my symptoms? · Do not sit or stand for long periods of time    This can cause the blood to collect in your legs and make your symptoms worse  Bend or rotate your ankles several times every hour  Walk around for a few minutes every hour to get blood moving in your legs  · Do not cross your legs when you sit  This decreases blood flow to your feet and can make your symptoms worse  · Do not wear tight clothing or shoes  Do not wear high-heeled shoes  Do not wear clothes that are tight around the waist or knees  · Maintain a healthy weight  Being overweight or obese can make your varicose veins worse  Ask your healthcare provider how much you should weigh  Ask him or her to help you create a weight loss plan if you are overweight  · Wear pressure stockings as directed  The stockings are tight and put pressure on your legs  They improve blood flow and help prevent clots  · Elevate your legs  Keep them above the level of your heart for 15 to 30 minutes several times a day  You can also prop the end of your bed up slightly to elevate your legs while you sleep  This will help blood to flow back to your heart  · Get regular exercise  Talk to your healthcare provider about the best exercise plan for you  Exercise can improve blood flow to your legs and feet  When should I seek immediate care? · You have a wound that does not heal or is infected  · You have an injury that has broken your skin and caused your varicose veins to bleed  · Your leg is swollen and hard  · You notice that your legs or feet are turning blue or black  · Your leg feels warm, tender, and painful  It may look swollen and red  When should I contact my healthcare provider? · You have pain in your leg that does not go away or gets worse  · You notice sudden large bruising on your legs  · You have a rash on your leg  · Your symptoms keep you from doing your daily activities  · You have questions or concerns about your condition or care    CARE AGREEMENT:   You have the right to help plan your care  Learn about your health condition and how it may be treated  Discuss treatment options with your caregivers to decide what care you want to receive  You always have the right to refuse treatment  The above information is an  only  It is not intended as medical advice for individual conditions or treatments  Talk to your doctor, nurse or pharmacist before following any medical regimen to see if it is safe and effective for you  © 2017 [address] [employer]  Information is for End User's use only and may not be sold, redistributed or otherwise used for commercial purposes  All illustrations and images included in CareNotes® are the copyrighted property of A [employer] A M [employer]  or [employer]      -recommend 3 month trial of conservative measures to include daily use of prescription compression stockings, leg elevation, low-salt diet, aerobic activity, weight management and lotion to leg to help promote good skin health  -place your compression stockings on in the morning and remove prior to bed  -we will schedule you for a lower extremity venous reflux study to assess the function of your valves in your veins to help guide treatment options  -return to office with surgeon in 3 months after reflux study for re-evaluation   -please contact the office in the interim with any questions, concerns or new symptoms

## 2021-02-26 NOTE — PROGRESS NOTES
Assessment/Plan:    Varicose veins of left lower extremity with pain  54 y/o female nonsmoker w/bipolar disorder, ETOH abuse and symptomatic LLE (left medial calf and anterior shin) reticular/truncal  varicosities  Patient has not worn compression for several months 8 years ago without symptomatic relief  Patient not wearing compression recently  -recommend 3 month trial of conservative measures to include daily use of compression stockings, lower extremity elevation, low-sodium diet, aerobic activity, weight management and skin moisturization  -LEVDR in 3 months  -return to office with surgeon in 3 months with LEVDR for re-evaluation and discussion of surgical options  -instructed to contact the office in the interim with any questions, concerns or new symptoms    Bipolar 2 disorder ([facility] )  -stable  -prior admission Oct  '20 for depression  -continue f/u with psychiatry  -continue current medical regimen       Diagnoses and all orders for this visit:    Varicose veins of left lower extremity with pain  -     Compression Stocking  -     VAS reflux lower limb venous duplex study with reflux assesment, unilateral; Future    B[facility] 2 disorder ([facility] )    Varicose veins of both lower extremities with pain  -     Ambulatory referral to Vascular Surgery          Subjective:      Patient ID: Opal Crouch is a 53 y o  female  New patient w/ symptomatic VV of LLE  Patient reports experiencing pain, swelling, burning, itching and heaviness to L leg  She has tried wearing compression in the past w/o relief  54 y/o female nonsmoker w/bipolar disorder, ETOH abuse and symptomatic LLE (left medial calf and anterior shin) reticular/truncal  varicosities referred by her PCP for evaluation of symptomatic varicose veins    Pt c/o left lower extremity heaviness, aching, pruritus, burning and edema exacerbated by standing for prolonged periods of time and progressive over the past several years   Patient denies hx of DVT, PE, hypercoagulable disorder, superficial thrombophlebitis, venous ulceration, bleeding varicosities, recurrent lower extremity cellulitis or stasis dermatitis   Patient reports significant family history of varicose veins but denies family history of VTE or hypercoagulable disorder   Patient denies prior venous intervention or evaluation by vascular surgeon  Patient has not worn compression for a period of several months 8 years ago without symptomatic relief  Patient not wearing compression recently  The following portions of the patient's history were reviewed and updated as appropriate: allergies, current medications, past family history, past medical history, past social history, past surgical history and problem list     Review of Systems   Constitutional: Negative  HENT: Negative  Eyes: Negative  Respiratory: Negative  Cardiovascular: Positive for leg swelling  Painful veins   Gastrointestinal: Negative  Endocrine: Negative  Genitourinary: Negative  Musculoskeletal: Negative  Skin: Negative  Allergic/Immunologic: Negative  Neurological: Negative  Hematological: Negative  Psychiatric/Behavioral: The patient is nervous/anxious  Depression       I have reviewed and made appropriate changes to the review of systems input by the medical assistant      Vitals:    21 0959   BP: 110/76   BP Location: Right arm   Patient Position: Sitting   Pulse: 68   Temp: 98 2 °F (36 8 °C)   TempSrc: Tympanic   Weight: 66 7 kg (147 lb)   Height: 5' 4" (1 626 m)       Patient Active Problem List   Diagnosis    Bipolar 2 disorder (HCC)    Fatigue    Menorrhagia with regular cycle    Intrinsic eczema    Alcohol use disorder, severe, dependence (HCC)    Varicose veins of left lower extremity with pain       Past Surgical History:   Procedure Laterality Date     SECTION         Family History   Problem Relation Age of Onset    No Known Problems Mother     No Known Problems Father        Social History     Socioeconomic History    Marital status: /Civil Union     Spouse name: Not on file    Number of children: 3    Years of education: Not on file    Highest education level: Not on file   Occupational History    Occupation: Unemployed   Social Needs    Financial resource strain: Not on file    Food insecurity     Worry: Not on file     Inability: Not on file   [employer] needs     Medical: Not on file     Non-medical: Not on file   Tobacco Use    Smoking status: Never Smoker    Smokeless tobacco: Never Used   Substance and Sexual Activity    Alcohol use: Yes     Alcohol/week: 6 0 standard drinks     Types: 6 Cans of beer per week     Frequency: 2-3 times a week     Drinks per session: 5 or 6     Binge frequency: Never     Comment: pt admits to "couple times a month" drinking a six pack at a time   has friend bedside stating pt drinks multiple times a week, patient reports binge drinking    Drug use: No    Sexual activity: Yes     Partners: Male   Lifestyle    Physical activity     Days per week: Not on file     Minutes per session: Not on file    Stress: Not on file   Relationships    Social connections     Talks on phone: Not on file     Gets together: Not on file     Attends [employer] service: Not on file     Active member of club or organization: Not on file     Attends meetings of clubs or organizations: Not on file     Relationship status: Not on file    Intimate partner violence     Fear of current or ex partner: Not on file     Emotionally abused: Not on file     Physically abused: Not on file     Forced sexual activity: Not on file   Other Topics Concern    Not on file   Social History Narrative    Not on file       No Known Allergies      Current Outpatient Medications:     divalproex sodium (DEPAKOTE ER) 250 mg 24 hr tablet, Take 7 tablets (1,750 mg total) by mouth daily at bedtime, Disp: 210 tablet, Rfl: 0    EluRyng 0 12-0 015 MG/24HR vaginal ring, Use as directed, Disp: , Rfl:     escitalopram (LEXAPRO) 20 mg tablet, Take 1 tablet (20 mg total) by mouth daily, Disp: 30 tablet, Rfl: 0    gabapentin (NEURONTIN) 300 mg capsule, One po bid and 2 po q bedtime, Disp: 120 capsule, Rfl: 1    naltrexone (REVIA) 50 mg tablet, Take 0 5 tablets (25 mg total) by mouth daily, Disp: 15 tablet, Rfl: 1    mupirocin (BACTROBAN) 2 % ointment, Apply topically 3 (three) times a day (Patient not taking: Reported on 2/15/2021), Disp: 22 g, Rfl: 0    Objective:      /76 (BP Location: Right arm, Patient Position: Sitting)   Pulse 68   Temp 98 2 °F (36 8 °C) (Tympanic)   Ht 5' 4" (1 626 m)   Wt 66 7 kg (147 lb)   LMP 02/08/2021   BMI 25 23 kg/m²          Physical Exam  Vitals signs and nursing note reviewed  Constitutional:       General: She is not in acute distress  Appearance: She is well-developed and normal weight  HENT:      Head: Normocephalic and atraumatic  Eyes:      General: No scleral icterus  Conjunctiva/sclera: Conjunctivae normal       Pupils: Pupils are equal, round, and reactive to light  Neck:      Musculoskeletal: Normal range of motion and neck supple  Thyroid: No thyromegaly  Vascular: No carotid bruit or JVD  Trachea: No tracheal deviation  Cardiovascular:      Rate and Rhythm: Normal rate and regular rhythm  Pulses:           Carotid pulses are 2+ on the right side and 2+ on the left side  Radial pulses are 2+ on the right side and 2+ on the left side  Dorsalis pedis pulses are 1+ on the right side and 2+ on the left side  Heart sounds: S1 normal and S2 normal  No murmur  No friction rub  No gallop  No S3 sounds  Comments: Bilateral lower extremities warm, pink, motor and sensory intact and well perfused without cyanosis, pallor, rubor, hemosiderin staining or lipodermatosclerosis  No venous ulcerations    Truncal varicosity left proximal anterior shin and left medial proximal calf with cluster of symptomatic superficial varicosities on left mid calf  Pulmonary:      Effort: No respiratory distress  Breath sounds: Normal breath sounds  No stridor  No wheezing, rhonchi or rales  Abdominal:      General: Bowel sounds are normal  There is no distension or abdominal bruit  Palpations: Abdomen is soft  There is no mass or pulsatile mass  Tenderness: There is no abdominal tenderness  There is no rebound  Musculoskeletal: Normal range of motion  General: No deformity  Right lower leg: No edema  Left lower leg: No edema  Skin:     General: Skin is warm and dry  Coloration: Skin is not pale  Findings: No erythema or lesion  Neurological:      General: No focal deficit present  Mental Status: She is alert and oriented to person, place, and time  Psychiatric:         Mood and Affect: Mood normal          Thought Content:  Thought content normal

## 2021-02-26 NOTE — ASSESSMENT & PLAN NOTE
52 y/o female nonsmoker w/bipolar disorder, ETOH abuse and symptomatic LLE (left medial calf and anterior shin) reticular/truncal  varicosities  Patient has not worn compression for several months 8 years ago without symptomatic relief  Patient not wearing compression recently    -recommend 3 month trial of conservative measures to include daily use of compression stockings, lower extremity elevation, low-sodium diet, aerobic activity, weight management and skin moisturization  -LEVDR in 3 months  -return to office with surgeon in 3 months with LEVDR for re-evaluation and discussion of surgical options  -instructed to contact the office in the interim with any questions, concerns or new symptoms

## 2021-02-26 NOTE — ASSESSMENT & PLAN NOTE
-stable  -prior admission Oct  '20 for depression  -continue f/u with psychiatry  -continue current medical regimen

## 2021-03-02 ENCOUNTER — TELEPHONE (OUTPATIENT)
Dept: FAMILY MEDICINE CLINIC | Facility: CLINIC | Age: 51
End: 2021-03-02

## 2021-03-02 DIAGNOSIS — R17 ELEVATED BILIRUBIN: Primary | ICD-10-CM

## 2021-03-10 DIAGNOSIS — Z23 ENCOUNTER FOR IMMUNIZATION: ICD-10-CM

## 2021-03-19 ENCOUNTER — IMMUNIZATIONS (OUTPATIENT)
Dept: FAMILY MEDICINE CLINIC | Facility: HOSPITAL | Age: 51
End: 2021-03-19

## 2021-03-19 DIAGNOSIS — Z23 ENCOUNTER FOR IMMUNIZATION: Primary | ICD-10-CM

## 2021-03-19 PROCEDURE — 0011A SARS-COV-2 / COVID-19 MRNA VACCINE (MODERNA) 100 MCG: CPT

## 2021-03-19 PROCEDURE — 91301 SARS-COV-2 / COVID-19 MRNA VACCINE (MODERNA) 100 MCG: CPT

## 2021-04-21 ENCOUNTER — IMMUNIZATIONS (OUTPATIENT)
Dept: FAMILY MEDICINE CLINIC | Facility: HOSPITAL | Age: 51
End: 2021-04-21

## 2021-04-21 DIAGNOSIS — Z23 ENCOUNTER FOR IMMUNIZATION: Primary | ICD-10-CM

## 2021-04-21 PROCEDURE — 91301 SARS-COV-2 / COVID-19 MRNA VACCINE (MODERNA) 100 MCG: CPT

## 2021-04-21 PROCEDURE — 0012A SARS-COV-2 / COVID-19 MRNA VACCINE (MODERNA) 100 MCG: CPT

## 2021-05-10 ENCOUNTER — RA CDI HCC (OUTPATIENT)
Dept: OTHER | Facility: HOSPITAL | Age: 51
End: 2021-05-10

## 2021-05-10 NOTE — PROGRESS NOTES
[facility]  coding opportunities          Chart reviewed, no opportunity found: CHART REVIEWED, NO OPPORTUNITY FOUND              Patients insurance company: [employer] only)

## 2021-05-14 ENCOUNTER — HOSPITAL ENCOUNTER (OUTPATIENT)
Dept: NON INVASIVE DIAGNOSTICS | Facility: HOSPITAL | Age: 51
Discharge: HOME | End: 2021-05-14
Payer: COMMERCIAL

## 2021-05-14 DIAGNOSIS — I83.812 VARICOSE VEINS OF LEFT LOWER EXTREMITY WITH PAIN: ICD-10-CM

## 2021-05-14 PROCEDURE — 93971 EXTREMITY STUDY: CPT | Performed by: SURGERY

## 2021-05-14 PROCEDURE — 93971 EXTREMITY STUDY: CPT

## 2021-06-08 ENCOUNTER — OFFICE VISIT (OUTPATIENT)
Dept: VASCULAR SURGERY | Facility: CLINIC | Age: 51
End: 2021-06-08
Payer: COMMERCIAL

## 2021-06-08 ENCOUNTER — TELEPHONE (OUTPATIENT)
Dept: VASCULAR SURGERY | Facility: CLINIC | Age: 51
End: 2021-06-08

## 2021-06-08 VITALS
BODY MASS INDEX: 25.81 KG/M2 | WEIGHT: 151.2 LBS | DIASTOLIC BLOOD PRESSURE: 76 MMHG | TEMPERATURE: 99.2 F | HEIGHT: 64 IN | SYSTOLIC BLOOD PRESSURE: 112 MMHG | HEART RATE: 73 BPM

## 2021-06-08 DIAGNOSIS — I83.812 VARICOSE VEINS OF LEFT LOWER EXTREMITY WITH PAIN: Primary | ICD-10-CM

## 2021-06-08 PROCEDURE — 1036F TOBACCO NON-USER: CPT | Performed by: SURGERY

## 2021-06-08 PROCEDURE — 3008F BODY MASS INDEX DOCD: CPT | Performed by: SURGERY

## 2021-06-08 PROCEDURE — 99214 OFFICE O/P EST MOD 30 MIN: CPT | Performed by: SURGERY

## 2021-06-08 RX ORDER — CEFAZOLIN SODIUM 1 G/50ML
1000 SOLUTION INTRAVENOUS ONCE
Status: CANCELLED | OUTPATIENT
Start: 2021-07-12 | End: 2021-06-08

## 2021-06-08 RX ORDER — GABAPENTIN 100 MG/1
CAPSULE ORAL
COMMUNITY
Start: 2021-05-12 | End: 2021-06-29

## 2021-06-08 RX ORDER — CHLORHEXIDINE GLUCONATE 0.12 MG/ML
15 RINSE ORAL ONCE
Status: CANCELLED | OUTPATIENT
Start: 2021-07-12 | End: 2021-06-08

## 2021-06-08 NOTE — PROGRESS NOTES
Assessment/Plan:    Presents with symptomatic varicosities left lower extremity with heaviness and aching after long day standing  Reflux study shows no proximal greater saphenous vein reflux  Her deep venous system is competent as well she has localized discomfort in area of superficial varicosities in the anterior shin and medial calf area as noted  Plan:  After long discussion and review of her symptom complex we are recommending multiple stab phlebectomies left lower extremity which should reduce her discomfort especially after long day standing  She has been wearing stockings graduated compression for the last 3 months with essentially no relief  Planning her surgical procedure an outpatient basis at [facility]     Diagnoses and all orders for this visit:    Varicose veins of left lower extremity with pain  -     Case request operating room: multiple stab phlebectomies left lower extremity, no stripping and ligation no laser ablation; Standing  -     Basic metabolic panel; Future  -     CBC and Platelet; Future  -     Case request operating room: multiple stab phlebectomies left lower extremity, no stripping and ligation no laser ablation    Other orders  -     gabapentin (NEURONTIN) 100 mg capsule  -     Diet NPO; Sips with meds; Standing  -     Void on call to OR; Standing  -     Insert peripheral IV; Standing  -     Nursing Communication CHG bath, have staff wash entire body (neck down) per pre op bathing protocol  Routine, evening prior to, and day of surgery ; Standing  -     Nursing Communication Swab both nares with Povidone-Iodine solution, EXCLUDE if patient has shellfish/Iodine allergy  Routine, day of surgery, on call to OR ; Standing  -     chlorhexidine (PERIDEX) 0 12 % oral rinse 15 mL  -     Place sequential compression device; Standing  -     ceFAZolin (ANCEF) 1,000 mg in dextrose 5 % 100 mL IVPB        Subjective:      Patient ID: Jeannie Stage is a 49 y o  female  Pt is here today for a 3 month f/u exam and to review results of LEVDR done 5/14/2021  Pt c/o b/l bulging painful veins that cause her legs to burn, itch, swell and feel heavy  Her Left leg is worse than the right  Pt has been wearing compression stockings daily  She has been elevating her legs when able  Pt is a non-smoker  HPI    The following portions of the patient's history were reviewed and updated as appropriate: allergies, current medications, past family history, past medical history, past social history, past surgical history and problem list     Review of Systems   Constitutional: Negative  HENT: Negative  Eyes: Negative  Respiratory: Negative  Cardiovascular: Positive for leg swelling  Painful veins   Gastrointestinal: Negative  Endocrine: Negative  Genitourinary: Negative  Musculoskeletal:        Leg pain   Skin: Negative  Allergic/Immunologic: Negative  Neurological: Negative  Hematological: Negative  Psychiatric/Behavioral: The patient is nervous/anxious  Depression         Objective: There were no vitals taken for this visit  Physical Exam      Oriented x3 no evidence of clinical depression  Eyes:  Sclera non-icteric    Skin: normal without evidence of inflammation    Neck is supple carotid pulses equal bilaterally no bruits heard    Chest lungs clear, heart regular rhythm  Abdomen soft nontender no evidence of pulsatile masses  Pulses are palpable bilateral Femoral  Popliteal, PT      upon standing varicosities are seen left lower extremity anterior shin and medial calf, cluster of spider vein are seen medially as well    Neurological exam intact cranial nerves 2-12 grossly intact no gross motor sensory deficits detected        Imaging viewed and reviewed with Patient    Operative Scheduling Information:    Hospital:  [facility]    Physician:  Me    Surgery:  Multiple stab phlebectomies left lower extremity    Urgency:  Standard    Level:  Level 4: Outpatients to be scheduled for screening procedures and elective surgery that can be delayed for longer than one month without reasonable expectation of detriment to patient      Case Length:  Normal    Post-op Bed:  Outpatient    OR Table:  Standard    Equipment Needs:  None    Medication Instructions:  None    Hydration:  No

## 2021-06-08 NOTE — PATIENT INSTRUCTIONS
Presents with symptomatic varicosities left lower extremity with heaviness and aching after long day standing  Reflux study shows no proximal greater saphenous vein reflux  Her deep venous system is competent as well she has localized discomfort in area of superficial varicosities in the anterior shin and medial calf area as noted  Plan:  After long discussion and review of her symptom complex we are recommending multiple stab phlebectomies left lower extremity which should reduce her discomfort especially after long day standing  She has been wearing stockings graduated compression for the last 3 months with essentially no relief    Planning her surgical procedure an outpatient basis at [facility]

## 2021-06-08 NOTE — ASSESSMENT & PLAN NOTE
Presents with symptomatic varicosities left lower extremity with heaviness and aching after long day standing  Reflux study shows no proximal greater saphenous vein reflux  Her deep venous system is competent as well she has localized discomfort in area of superficial varicosities in the anterior shin and medial calf area as noted  Plan:  After long discussion and review of her symptom complex we are recommending multiple stab phlebectomies left lower extremity which should reduce her discomfort especially after long day standing  She has been wearing stockings graduated compression for the last 3 months with essentially no relief    Planning her surgical procedure an outpatient basis at [facility]

## 2021-06-08 NOTE — TELEPHONE ENCOUNTER
REMINDER: Under Reason For Call, comments MUST be formatted as:   (Surgeon's Initials) / (Procedure)    Physician / WILI KEITH: Vaishnavi Etienne (NPI: [id number]) / Argenis Cuello (Tax: [id number] / NPI: [id number])    Procedure:  Multiple stab phlebectomies left lower extremity    Level: 4 - Route clearance(s) to [facility] Surgery Coordinator Pool    Equipment / Rep Needs: No    Assistant Surgeon: No    Allergies: Patient has no known allergies  Instructions Given: NO Bowel Prep General Instructions     Blood Thinners / Medication Hold: Patient is not taking any blood thinners  Hydration Required: Patient does not require hydration  Dialysis: Patient is not on dialysis  Consent: I certify that patient has signed, printed, timed, and dated their surgery consent  I certify that BOTH sides of the completed surgery consent have been scanned into the patient's Epic chart by myself on 6/8/2021  Yes, I have LABELED the consent in Epic as Consent for Vascular Procedure  Clearances     Levels   1-3 ROUTE this encounter to [facility] Clearance Pool   AND   SEND Clearance Form(s) to Vascular Nursing e-mail group   Level   4 ROUTE this encounter to [facility] Surgery Coordinator Pool  AND   SEND Clearance Form(s) to Vascular Surgery Schedulers e-mail group     Patient does not require any pre operative clearance  Yes, I have ROUTED this encounter to [facility] Surgery Coordinator and/or [facility] Clearance Pool

## 2021-06-14 ENCOUNTER — APPOINTMENT (OUTPATIENT)
Dept: LAB | Facility: HOSPITAL | Age: 51
End: 2021-06-14
Payer: COMMERCIAL

## 2021-06-14 DIAGNOSIS — E87.1 HYPONATREMIA: Primary | ICD-10-CM

## 2021-06-14 DIAGNOSIS — I83.812 VARICOSE VEINS OF LEFT LOWER EXTREMITY WITH PAIN: ICD-10-CM

## 2021-06-14 DIAGNOSIS — R17 ELEVATED BILIRUBIN: ICD-10-CM

## 2021-06-14 DIAGNOSIS — Z79.899 ENCOUNTER FOR LONG-TERM (CURRENT) USE OF OTHER MEDICATIONS: Primary | ICD-10-CM

## 2021-06-14 LAB
ALBUMIN SERPL BCP-MCNC: 3.9 G/DL (ref 3.5–5.7)
ALP SERPL-CCNC: 26 U/L (ref 40–150)
ALT SERPL W P-5'-P-CCNC: 17 U/L (ref 7–52)
ANION GAP SERPL CALCULATED.3IONS-SCNC: 7 MMOL/L (ref 4–13)
AST SERPL W P-5'-P-CCNC: 20 U/L (ref 13–39)
BILIRUB SERPL-MCNC: 0.3 MG/DL (ref 0.2–1)
BUN SERPL-MCNC: 12 MG/DL (ref 7–25)
CALCIUM SERPL-MCNC: 8.6 MG/DL (ref 8.6–10.5)
CHLORIDE SERPL-SCNC: 101 MMOL/L (ref 98–107)
CO2 SERPL-SCNC: 25 MMOL/L (ref 21–31)
CREAT SERPL-MCNC: 0.69 MG/DL (ref 0.6–1.2)
ERYTHROCYTE [DISTWIDTH] IN BLOOD BY AUTOMATED COUNT: 13.1 % (ref 11.5–14.5)
GFR SERPL CREATININE-BSD FRML MDRD: 102 ML/MIN/1.73SQ M
GLUCOSE P FAST SERPL-MCNC: 112 MG/DL (ref 65–99)
HCT VFR BLD AUTO: 41.6 % (ref 42–47)
HGB BLD-MCNC: 14 G/DL (ref 12–16)
MCH RBC QN AUTO: 30.1 PG (ref 26–34)
MCHC RBC AUTO-ENTMCNC: 33.6 G/DL (ref 31–37)
MCV RBC AUTO: 90 FL (ref 81–99)
PLATELET # BLD AUTO: 248 THOUSANDS/UL (ref 149–390)
PMV BLD AUTO: 9.8 FL (ref 8.6–11.7)
POTASSIUM SERPL-SCNC: 4.1 MMOL/L (ref 3.5–5.5)
PROT SERPL-MCNC: 6.2 G/DL (ref 6.4–8.9)
RBC # BLD AUTO: 4.65 MILLION/UL (ref 3.9–5.2)
SODIUM SERPL-SCNC: 133 MMOL/L (ref 134–143)
WBC # BLD AUTO: 7.4 THOUSAND/UL (ref 4.8–10.8)

## 2021-06-14 PROCEDURE — 80053 COMPREHEN METABOLIC PANEL: CPT

## 2021-06-14 PROCEDURE — 85027 COMPLETE CBC AUTOMATED: CPT

## 2021-06-14 PROCEDURE — 36415 COLL VENOUS BLD VENIPUNCTURE: CPT

## 2021-06-14 PROCEDURE — 87389 HIV-1 AG W/HIV-1&-2 AB AG IA: CPT

## 2021-06-14 NOTE — TELEPHONE ENCOUNTER
Yasmani : [birth year] Surgery:  Multiple stab phlebectomies left lower extremity scheduled SLLE/OR 21  Post op appt question: [facility] in [place]  or [facility] ? What instruments are needed for procedure so I can let the OR know what to make available         Margareth on the  better, next day less effective    Message text    Marialuisa Reyes MD  You 1 hour ago (10:00 AM)      Very basic set, fine mosquitos and #11 blade for the most part       Message text

## 2021-06-15 LAB — HIV 1+2 AB+HIV1 P24 AG SERPL QL IA: NORMAL

## 2021-06-15 NOTE — TELEPHONE ENCOUNTER
Is patient requesting a call when authorization has been obtained? Patient did not request a call  Surgery Date: 7/12/21  Primary Surgeon: Sree Cano (NPI: [id number])  Assisting Surgeon: Not Applicable (N/A)  Facility: [facility] (Tax: [id number] / NPI: [id number])  Inpatient / Outpatient: Outpatient  Level: 4    Clearance Received: No clearance ordered  Consent Received: Yes, scanned into Epic on 6/8/21  Medication Hold / Last Dose: Not Applicable (N/A)  VQI Spreadsheet: Not Applicable (N/A)  IR Notified: Not Applicable (N/A)  Rep  Notified: Not Applicable (N/A)  Equipment Needs: PAIZABEL/EMAILD Megha Avalos 6/15/21/FINE MOSQUITOES AND #11 BLADE  Vas Lab Requested: NOT NEEDED FOR STAB PHLEBS  Patient Contacted: 6/14/21    Diagnosis: I83 812  Procedure/ CPT Code(s): Stab Phlebectomies of the left lower extremity /// CPT: 10650    For varicose vein related procedures, last LEVDR? 5/14/21, patient's LEVDR was completed within 6-months of their procedure date  Post Operative Date/ Time: 7/16/21 , 830AM Erich with LEONARD Pelletier (NPI: [id number])     *Please review with patient med hold, PATs, and check H&P *  PATIENT WAS MAILED SURGERY/SHOWERING/DISCHARGE/COVID INSTRUCTIONS AFTER REVIEWING WITH HER VIA PHONE CALL

## 2021-06-15 NOTE — TELEPHONE ENCOUNTER
Authorization requirements reviewed  Please refer to Nikki Cole / Val Maldonado number [id number] for case updates

## 2021-06-24 ENCOUNTER — RA CDI HCC (OUTPATIENT)
Dept: OTHER | Facility: HOSPITAL | Age: 51
End: 2021-06-24

## 2021-06-24 NOTE — PROGRESS NOTES
[facility]  coding opportunities          Chart reviewed, no opportunity found: CHART REVIEWED, NO OPPORTUNITY FOUND                     Patients insurance company: [employer] only)

## 2021-06-29 RX ORDER — MULTIVITAMIN
1 CAPSULE ORAL DAILY
COMMUNITY

## 2021-06-29 RX ORDER — IBUPROFEN 200 MG
TABLET ORAL EVERY 6 HOURS PRN
COMMUNITY

## 2021-06-29 NOTE — PRE-PROCEDURE INSTRUCTIONS
Pre-Surgery Instructions:   Medication Instructions    divalproex sodium (DEPAKOTE ER) 250 mg 24 hr tablet Instructed patient per Anesthesia Guidelines   escitalopram (LEXAPRO) 20 mg tablet Instructed patient per Anesthesia Guidelines   gabapentin (NEURONTIN) 300 mg capsule Instructed patient per Anesthesia Guidelines   ibuprofen (MOTRIN) 200 mg tablet Instructed patient per Anesthesia Guidelines   Multiple Vitamin (multivitamin) capsule Instructed patient per Anesthesia Guidelines   naltrexone (REVIA) 50 mg tablet Instructed patient per Anesthesia Guidelines  Instructed to take lexapro/ naltrexone  and gabapentin am of surgery with sip ofw ater per anesthesia guidelines

## 2021-07-12 ENCOUNTER — ANESTHESIA EVENT (OUTPATIENT)
Dept: PERIOP | Facility: HOSPITAL | Age: 51
End: 2021-07-12
Payer: COMMERCIAL

## 2021-07-12 ENCOUNTER — HOSPITAL ENCOUNTER (OUTPATIENT)
Facility: HOSPITAL | Age: 51
Setting detail: OUTPATIENT SURGERY
Discharge: HOME | End: 2021-07-12
Attending: SURGERY | Admitting: SURGERY
Payer: COMMERCIAL

## 2021-07-12 ENCOUNTER — ANESTHESIA (OUTPATIENT)
Dept: PERIOP | Facility: HOSPITAL | Age: 51
End: 2021-07-12
Payer: COMMERCIAL

## 2021-07-12 VITALS
OXYGEN SATURATION: 98 % | TEMPERATURE: 98.2 F | HEIGHT: 64 IN | BODY MASS INDEX: 25.78 KG/M2 | DIASTOLIC BLOOD PRESSURE: 72 MMHG | HEART RATE: 70 BPM | SYSTOLIC BLOOD PRESSURE: 110 MMHG | RESPIRATION RATE: 18 BRPM | WEIGHT: 151 LBS

## 2021-07-12 DIAGNOSIS — I83.812 VARICOSE VEINS OF LEFT LOWER EXTREMITY WITH PAIN: Primary | ICD-10-CM

## 2021-07-12 LAB
EXT PREGNANCY TEST URINE: NEGATIVE
EXT. CONTROL: NORMAL

## 2021-07-12 PROCEDURE — 99024 POSTOP FOLLOW-UP VISIT: CPT | Performed by: SURGERY

## 2021-07-12 PROCEDURE — 37765 STAB PHLEB VEINS XTR 10-20: CPT | Performed by: SURGERY

## 2021-07-12 PROCEDURE — 81025 URINE PREGNANCY TEST: CPT | Performed by: SURGERY

## 2021-07-12 RX ORDER — OXYCODONE HYDROCHLORIDE AND ACETAMINOPHEN 5; 325 MG/1; MG/1
1 TABLET ORAL EVERY 4 HOURS PRN
Qty: 12 TABLET | Refills: 0 | Status: SHIPPED | OUTPATIENT
Start: 2021-07-12 | End: 2021-07-22

## 2021-07-12 RX ORDER — SODIUM CHLORIDE, SODIUM LACTATE, POTASSIUM CHLORIDE, CALCIUM CHLORIDE 600; 310; 30; 20 MG/100ML; MG/100ML; MG/100ML; MG/100ML
125 INJECTION, SOLUTION INTRAVENOUS CONTINUOUS
Status: DISCONTINUED | OUTPATIENT
Start: 2021-07-12 | End: 2021-07-12 | Stop reason: HOSPADM

## 2021-07-12 RX ORDER — MIDAZOLAM HYDROCHLORIDE 2 MG/2ML
INJECTION, SOLUTION INTRAMUSCULAR; INTRAVENOUS AS NEEDED
Status: DISCONTINUED | OUTPATIENT
Start: 2021-07-12 | End: 2021-07-12

## 2021-07-12 RX ORDER — ONDANSETRON 2 MG/ML
4 INJECTION INTRAMUSCULAR; INTRAVENOUS ONCE AS NEEDED
Status: DISCONTINUED | OUTPATIENT
Start: 2021-07-12 | End: 2021-07-12 | Stop reason: HOSPADM

## 2021-07-12 RX ORDER — DEXAMETHASONE SODIUM PHOSPHATE 10 MG/ML
INJECTION, SOLUTION INTRAMUSCULAR; INTRAVENOUS AS NEEDED
Status: DISCONTINUED | OUTPATIENT
Start: 2021-07-12 | End: 2021-07-12

## 2021-07-12 RX ORDER — OXYCODONE HYDROCHLORIDE AND ACETAMINOPHEN 5; 325 MG/1; MG/1
1 TABLET ORAL EVERY 4 HOURS PRN
Status: DISCONTINUED | OUTPATIENT
Start: 2021-07-12 | End: 2021-07-12 | Stop reason: HOSPADM

## 2021-07-12 RX ORDER — SODIUM CHLORIDE 9 MG/ML
125 INJECTION, SOLUTION INTRAVENOUS CONTINUOUS
Status: DISCONTINUED | OUTPATIENT
Start: 2021-07-12 | End: 2021-07-12 | Stop reason: HOSPADM

## 2021-07-12 RX ORDER — MAGNESIUM HYDROXIDE 1200 MG/15ML
LIQUID ORAL AS NEEDED
Status: DISCONTINUED | OUTPATIENT
Start: 2021-07-12 | End: 2021-07-12 | Stop reason: HOSPADM

## 2021-07-12 RX ORDER — PROPOFOL 10 MG/ML
INJECTION, EMULSION INTRAVENOUS AS NEEDED
Status: DISCONTINUED | OUTPATIENT
Start: 2021-07-12 | End: 2021-07-12

## 2021-07-12 RX ORDER — CHLORHEXIDINE GLUCONATE 0.12 MG/ML
15 RINSE ORAL ONCE
Status: COMPLETED | OUTPATIENT
Start: 2021-07-12 | End: 2021-07-12

## 2021-07-12 RX ORDER — ONDANSETRON 2 MG/ML
INJECTION INTRAMUSCULAR; INTRAVENOUS AS NEEDED
Status: DISCONTINUED | OUTPATIENT
Start: 2021-07-12 | End: 2021-07-12

## 2021-07-12 RX ORDER — CEFAZOLIN SODIUM 1 G/50ML
1000 SOLUTION INTRAVENOUS ONCE
Status: COMPLETED | OUTPATIENT
Start: 2021-07-12 | End: 2021-07-12

## 2021-07-12 RX ORDER — FENTANYL CITRATE 50 UG/ML
INJECTION, SOLUTION INTRAMUSCULAR; INTRAVENOUS AS NEEDED
Status: DISCONTINUED | OUTPATIENT
Start: 2021-07-12 | End: 2021-07-12

## 2021-07-12 RX ORDER — ONDANSETRON 2 MG/ML
4 INJECTION INTRAMUSCULAR; INTRAVENOUS EVERY 6 HOURS PRN
Status: DISCONTINUED | OUTPATIENT
Start: 2021-07-12 | End: 2021-07-12 | Stop reason: HOSPADM

## 2021-07-12 RX ORDER — EPHEDRINE SULFATE 50 MG/ML
INJECTION INTRAVENOUS AS NEEDED
Status: DISCONTINUED | OUTPATIENT
Start: 2021-07-12 | End: 2021-07-12

## 2021-07-12 RX ORDER — FENTANYL CITRATE/PF 50 MCG/ML
25 SYRINGE (ML) INJECTION
Status: DISCONTINUED | OUTPATIENT
Start: 2021-07-12 | End: 2021-07-12 | Stop reason: HOSPADM

## 2021-07-12 RX ORDER — LIDOCAINE HYDROCHLORIDE 10 MG/ML
INJECTION, SOLUTION EPIDURAL; INFILTRATION; INTRACAUDAL; PERINEURAL AS NEEDED
Status: DISCONTINUED | OUTPATIENT
Start: 2021-07-12 | End: 2021-07-12

## 2021-07-12 RX ADMIN — CHLORHEXIDINE GLUCONATE 0.12% ORAL RINSE 15 ML: 1.2 LIQUID ORAL at 06:57

## 2021-07-12 RX ADMIN — ONDANSETRON 4 MG: 2 INJECTION INTRAMUSCULAR; INTRAVENOUS at 08:08

## 2021-07-12 RX ADMIN — LIDOCAINE HYDROCHLORIDE 50 MG: 10 INJECTION, SOLUTION EPIDURAL; INFILTRATION; INTRACAUDAL; PERINEURAL at 07:34

## 2021-07-12 RX ADMIN — FENTANYL CITRATE 50 MCG: 50 INJECTION INTRAMUSCULAR; INTRAVENOUS at 08:09

## 2021-07-12 RX ADMIN — CEFAZOLIN SODIUM 1000 MG: 1 SOLUTION INTRAVENOUS at 07:28

## 2021-07-12 RX ADMIN — SODIUM CHLORIDE 125 ML/HR: 0.9 INJECTION, SOLUTION INTRAVENOUS at 06:59

## 2021-07-12 RX ADMIN — FENTANYL CITRATE 25 MCG: 50 INJECTION, SOLUTION INTRAMUSCULAR; INTRAVENOUS at 08:32

## 2021-07-12 RX ADMIN — FENTANYL CITRATE 25 MCG: 50 INJECTION INTRAMUSCULAR; INTRAVENOUS at 07:41

## 2021-07-12 RX ADMIN — DEXAMETHASONE SODIUM PHOSPHATE 4 MG: 10 INJECTION, SOLUTION INTRAMUSCULAR; INTRAVENOUS at 07:57

## 2021-07-12 RX ADMIN — OXYCODONE HYDROCHLORIDE AND ACETAMINOPHEN 1 TABLET: 5; 325 TABLET ORAL at 09:07

## 2021-07-12 RX ADMIN — EPHEDRINE SULFATE 10 MG: 50 INJECTION, SOLUTION INTRAVENOUS at 08:04

## 2021-07-12 RX ADMIN — EPHEDRINE SULFATE 5 MG: 50 INJECTION, SOLUTION INTRAVENOUS at 07:56

## 2021-07-12 RX ADMIN — PROPOFOL 180 MG: 10 INJECTION, EMULSION INTRAVENOUS at 07:34

## 2021-07-12 RX ADMIN — MIDAZOLAM HYDROCHLORIDE 2 MG: 1 INJECTION, SOLUTION INTRAMUSCULAR; INTRAVENOUS at 07:28

## 2021-07-12 RX ADMIN — FENTANYL CITRATE 25 MCG: 50 INJECTION INTRAMUSCULAR; INTRAVENOUS at 07:51

## 2021-07-12 RX ADMIN — FENTANYL CITRATE 25 MCG: 50 INJECTION, SOLUTION INTRAMUSCULAR; INTRAVENOUS at 08:41

## 2021-07-12 NOTE — OP NOTE
OPERATIVE REPORT  PATIENT NAME: Fiorella Heard    :  1970  MRN: [id number]  Pt Location: [facility] OR ROOM 01    SURGERY DATE: 2021    Surgeon(s) and Role:     Onel Dior MD - Primary    Preop Diagnosis:  Varicose veins of left lower extremity with pain [I83 812]    Post-Op Diagnosis Codes: * Varicose veins of left lower extremity with pain [I83 812]    Procedure(s) (LRB):  multiple stab phlebectomies left lower extremity, no stripping and ligation no laser ablation (Left)    Specimen(s):  * No specimens in log *    Estimated Blood Loss:   Minimal    Drains:  * No LDAs found *    Anesthesia Type:   General    Operative Indications:  Varicose veins of left lower extremity with pain [I83 812]      Operative Findings:  See op report    Complications:   None    Procedure and Technique:      The patient Fiorella Heard was identified in the operating room after adequate laryngeal mask anesthesia was obtained the left  leg was prepped and draped using Betadine prep and Sterile drapes  Multiple varicosities were marked preop while patient was standing  Multiple stab phebectomies 10-20 were performed, varicosities were excised  Leg was elevated and hemostasis was obtained with mild pressure  Quarter-inch Steri-Strips were placed and a sterile compressive dressing was applied  Estimated blood loss was minimal  The patient was taken to the recovery room in stable condition       I was present for the entire procedure and A qualified resident physician was not available    Patient Disposition:  PACU  and hemodynamically stable    SIGNATURE: Carlos Greer MD  DATE: 2021  TIME: 8:20 AM

## 2021-07-12 NOTE — ANESTHESIA PREPROCEDURE EVALUATION
Procedure:  multiple stab phlebectomies left lower extremity, no stripping and ligation no laser ablation (Left Leg Lower)    Relevant Problems   ANESTHESIA (within normal limits)      CARDIO   (+) Varicose veins of left lower extremity with pain      Other   (+) Alcohol use disorder, severe, dependence (HCC)   (+) Bipolar 2 disorder (HCC)        Physical Exam    Airway    Mallampati score: I  TM Distance: >3 FB  Neck ROM: full     Dental   No notable dental hx     Cardiovascular  Cardiovascular exam normal    Pulmonary  Pulmonary exam normal     Other Findings        Anesthesia Plan  ASA Score- 2     Anesthesia Type- IV sedation with anesthesia with ASA Monitors  Additional Monitors:   Airway Plan: ETT and LMA  Comment: I discussed risks (reviewed with patient on the anesthesia consent form), benefits and alternatives for General Anesthesia  These risks did include breathing tube remaining in place if not strong enough, PONV, damage to lips and teeth, sore throat, eye injury or blindness, risk of heart attack or stroke that may lead to death          Plan Factors-    Chart reviewed  Patient summary reviewed  Induction- intravenous  Postoperative Plan- Plan for postoperative opioid use  Informed Consent- Anesthetic plan and risks discussed with patient  I personally reviewed this patient with the CRNA  Discussed and agreed on the Anesthesia Plan with the CRNA  Coby Ramos

## 2021-07-12 NOTE — CONSULTS
Consultation - Vascular Surgery   Kuldeep Gruber 49 y o  female MRN: [id number]  Unit/Bed#: OR POOL Encounter: [id number]      Assessment/Plan      Assessment:  Symptomatic varicosities left lower extremity  Plan:  Multiple stab phlebectomies left lower extremity    History of Present Illness   Physician Requesting Consult: Roberth Mcfarlane MD  Reason for Consult / Principal Problem:  Symptomatic varicosities left lower extremity with heaviness and aching after long day standing  History, ROS and PFSH    HPI: Barb Araujo is a 49y o  year old female who presents with symptomatic varicosities left lower extremity  She has heaviness and aching after long day standing some burning, duplex imaging showed no evidence of saphenous reflux in the upper thigh therefore ablation is not indicated  Planning multiple stab phlebectomies left lower extremity      Consults    Review of Systems    Historical Information   Past Medical History:   Diagnosis Date    Alcohol abuse     Anxiety     Bipolar 1 disorder ([facility] [address] )     Depression     Varicose veins of left lower extremity      Past Surgical History:   Procedure Laterality Date     SECTION       Social History   Social History     Substance and Sexual Activity   Alcohol Use Not Currently    Alcohol/week: 6 0 standard drinks    Types: 6 Cans of beer per week    Comment: hx ETOH abuse     Social History     Substance and Sexual Activity   Drug Use No     E-Cigarette/Vaping    E-Cigarette Use Never User      E-Cigarette/Vaping Substances    Nicotine No     THC No     CBD No     Flavoring No     Other No     Unknown No      Social History     Tobacco Use   Smoking Status Former Smoker   Smokeless Tobacco Never Used   Tobacco Comment    quit 6 months ago     Family History: non-contributory}    Meds/Allergies   all current active meds have been reviewed  No Known Allergies    Objective   Vitals: Blood pressure 111/65, pulse 59, temperature (!) 97 3 °F (36 3 °C), temperature source Temporal, resp  rate 18, height 5' 4" (1 626 m), weight 68 5 kg (151 lb), last menstrual period 06/22/2021, SpO2 96 %, not currently breastfeeding  ,Body mass index is 25 92 kg/m²  No intake or output data in the 24 hours ending 07/12/21 0725  Invasive Devices     Peripheral Intravenous Line            Peripheral IV 07/12/21 Right;Ventral (anterior) Hand <1 day                Physical Exam neck is supple no carotid bruits heard, chest lungs clear, heart regular rhythm, palpable femoral popliteal and posterior tibial pulses bilaterally  Upon standing truncal varicosities are seen along the anterior aspect of the shin as well as medial thigh, these were marked while standing in anticipation of phlebectomy  Lab Results: I have personally reviewed pertinent reports  Imaging Studies: I have personally reviewed pertinent reports  EKG, Pathology, and Other Studies: I have personally reviewed pertinent reports      VTE Prophylaxis: Sequential compression device Corrinne Picket)      Code Status: Prior  Advance Directive and Living Will:      Power of :    POLST:      Counseling / Coordination of Care

## 2021-07-12 NOTE — DISCHARGE INSTRUCTIONS
DISCHARGE INSTRUCTIONS  VARICOSE VEIN SURGERY    1) When released from the hospital, you should have a compression bandage in place on the operated leg  This bandage should feel snug but not too tight  If the bandage becomes blood soaked or painfully tight, elevate your leg and call your surgeon immediately  2) If the operated leg becomes increasingly painful or swollen, or if there is increasing redness or pain around your incision, contact our office  3) On the day of your operation, take it easy and elevate your leg as much as possible  You can take short walks around the house  When sitting, the leg should be elevated  The preferred position is to have the leg at or above the level of the heart  Starting on the first day after surgery, light walking is strongly encouraged as tolerated  After your ultrasound test, you can resume your normal activity, but no heavy lifting or strenuous exercise for 2 weeks  4) Some bruising of the skin is common after varicose vein surgery  This can be lessened by strict elevation of the leg  Many patients will notice some numbness of the shin, ankle, calf, or the top of the foot  This usually fades with time, but may be persistent  After surgery you can expect bruising, swelling and hard knots on your leg  As your body heals the bruising will fade and the swelling and knots will subside  5) Observe incisions daily  Report to our office any of the following:  a) Any areas that are red and angry in appearance  b) Any drainage that is milky or cloudy in appearance or that has a foul odor  c) Elevated temperature of 100 5 degrees F or greater  6) Apply sunscreen with SPF 30 to incisions while sun bathing for up to one year after surgery to reduce the chances of your incisions darkening  7) Your first post-operative appointment will be 2-3 days after your surgery   At this appointment your bandages will be removed and you will be seen by a Vascular Surgeon, Nurse Practicioner, or Physician Assistant  An appointment for a follow up Doppler study will be scheduled 5-7 days after surgery  8)         If have any questions, please call our office at ([phone])  DISCHARGE INSTRUCTIONS  VARICOSE VEIN SURGERY    8) When released from the hospital, you should have a compression bandage in place on the operated leg  This bandage should feel snug but not too tight  If the bandage becomes blood soaked or painfully tight, elevate your leg and call your surgeon immediately  9) If the operated leg becomes increasingly painful or swollen, or if there is increasing redness or pain around your incision, contact our office  10) On the day of your operation, take it easy and elevate your leg as much as possible  You can take short walks around the house  When sitting, the leg should be elevated  The preferred position is to have the leg at or above the level of the heart  Starting on the first day after surgery, light walking is strongly encouraged as tolerated  After your ultrasound test, you can resume your normal activity, but no heavy lifting or strenuous exercise for 2 weeks  11) Some bruising of the skin is common after varicose vein surgery  This can be lessened by strict elevation of the leg  Many patients will notice some numbness of the shin, ankle, calf, or the top of the foot  This usually fades with time, but may be persistent  After surgery you can expect bruising, swelling and hard knots on your leg  As your body heals the bruising will fade and the swelling and knots will subside  12) Observe incisions daily  Report to our office any of the following:  a) Any areas that are red and angry in appearance  b) Any drainage that is milky or cloudy in appearance or that has a foul odor  c) Elevated temperature of 100 5 degrees F or greater      15) Apply sunscreen with SPF 30 to incisions while sun bathing for up to one year after surgery to reduce the chances of your incisions darkening  15) Your first post-operative appointment will be 2-3 days after your surgery  At this appointment your bandages will be removed and you will be seen by a Vascular Surgeon, Nurse Practicioner, or Physician Assistant  An appointment for a follow up Doppler study will be scheduled 5-7 days after surgery  8)         If have any questions, please call our office at ([phone])

## 2021-07-15 NOTE — ASSESSMENT & PLAN NOTE
50 y/o female nonsmoker w/bipolar disorder, ETOH abuse and symptomatic painful LLE (left medial calf and anterior shin) reticular/truncal varicosities without deep or superficial venous incompetence on LEVDR who is now s/p LLE stab phlebectomies x 10-20 by Dr Francia Aschoff 7/12/21  Pt presents for postoperative f/u and dressing removal   -doing well, stab sites healing well  Operative dressing removed in the office today without incident   -initiate postop incisional care as discussed and outlined  Clean stab sites daily with soap and water  No soaking or submerging incisions x 3 week  No lotions, ointments or creams to incisions x 3 weeks   -resume use of compression stockings when tolerated  Compression and elevation PRN for symptomatic relief and edema control  -return to office with Dr Francia Aschoff in 4-6 weeks for routine postop follow-up and discussion of sclerotherapy   -instructed to contact the office w/new symptoms or changes in incisions

## 2021-07-15 NOTE — PATIENT INSTRUCTIONS
DISCHARGE INSTRUCTIONS        VARICOSE VEIN SURGERY    1) When released from the hospital, you should have a compression bandage in place on the operated leg  This bandage should feel snug but not too tight  If the bandage becomes blood soaked or painfully tight, elevate your leg and call your surgeon immediately  2) If the operated leg becomes increasingly painful or swollen, or if there is increasing redness or pain around your incision, contact our office  3) For the first day after your operation elevate your leg as much as possible  You are encouraged to walk at least 20 minutes the first day  When sitting, the leg should be elevated  The preferred position is to have the leg at or above the level of the heart  Starting on the second post-op day, walking is strongly encouraged as tolerated  4) Some bruising of the skin is common after varicose vein surgery  This can be lessened by strict elevation of the leg  Many patients will notice some numbness of the shin, ankle, calf, or the top of the foot  This usually fades with time, but may be persistent  After surgery you can expect bruising, swelling and hard knots on your leg  As your body heals the bruising will fade and the swelling and knots will subside  5) Observe incisions daily  Report to our office any of the following:  a) Any areas that are red and angry in appearance  b) Any drainage that is milky or cloudy in appearance or that has a foul odor  c) Elevated temperature of 100 5 degrees F or greater  6) Apply sunscreen with SPF 30 to incisions while sun bathing for up to one year after surgery to reduce the chances of your incisions darkening  7) Your first post-operative appointment will be 2 to 3 days after your surgery  At this appointment, your bandages will be removed and the scheduled office surgeon, not necessarily the surgeon that did your surgery, will see you               8)         If have any questions, please call our office at [phone]) [address]  [phone] [facility] FREE [phone]  [address] [facility] , [address], [place] [facility] [place], [address]  [address], [address] [place], [address]  [address], [facility], [address]  [address] [facility], [facility], [address] [facility] [address]  [address], [address], [address], [address] [facility], [facility]   [address] [facility], [address] [facility], [place] [facility] [place], [address] [place], [address] [place] [address], [facility], [address], [address]    -clean incisions daily with soap and water  No lotions, ointments or creams to incisions x 3 weeks  No soaking or submerging incisions x 3 weeks  No swimming   -resume use of compression stockings when tolerated  Use compression and leg elevation as needed for control of swelling and symptomatic relief   -return to office in 4-6 weeks with Dr Kirsten Lesches for postop follow-up   -please contact the office with new symptoms or changes in incisions

## 2021-07-16 ENCOUNTER — OFFICE VISIT (OUTPATIENT)
Dept: VASCULAR SURGERY | Facility: CLINIC | Age: 51
End: 2021-07-16

## 2021-07-16 VITALS
WEIGHT: 147.6 LBS | HEART RATE: 76 BPM | DIASTOLIC BLOOD PRESSURE: 80 MMHG | BODY MASS INDEX: 25.2 KG/M2 | TEMPERATURE: 99 F | SYSTOLIC BLOOD PRESSURE: 112 MMHG | HEIGHT: 64 IN

## 2021-07-16 DIAGNOSIS — Z98.890 POSTOPERATIVE STATE: ICD-10-CM

## 2021-07-16 DIAGNOSIS — I83.812 VARICOSE VEINS OF LEFT LOWER EXTREMITY WITH PAIN: Primary | ICD-10-CM

## 2021-07-16 PROCEDURE — 99024 POSTOP FOLLOW-UP VISIT: CPT | Performed by: PHYSICIAN ASSISTANT

## 2021-07-16 PROCEDURE — 3008F BODY MASS INDEX DOCD: CPT | Performed by: SURGERY

## 2021-07-16 RX ORDER — GABAPENTIN 100 MG/1
CAPSULE ORAL
COMMUNITY
Start: 2021-07-05

## 2021-07-16 RX ORDER — DIVALPROEX SODIUM 500 MG/1
TABLET, EXTENDED RELEASE ORAL
COMMUNITY
Start: 2021-07-02

## 2021-07-16 NOTE — PROGRESS NOTES
Assessment/Plan:    Varicose veins of left lower extremity with pain  48 y/o female nonsmoker w/bipolar disorder, ETOH abuse and symptomatic painful LLE (left medial calf and anterior shin) reticular/truncal varicosities without deep or superficial venous incompetence on LEVDR who is now s/p LLE stab phlebectomies x 10-20 by Dr Cuauhtemoc Grande 7/12/21  Pt presents for postoperative f/u and dressing removal   -doing well, stab sites healing well  Operative dressing removed in the office today without incident   -initiate postop incisional care as discussed and outlined  Clean stab sites daily with soap and water  No soaking or submerging incisions x 3 week  No lotions, ointments or creams to incisions x 3 weeks   -resume use of compression stockings when tolerated  Compression and elevation PRN for symptomatic relief and edema control  -return to office with Dr Cuauhtemoc Grande in 4-6 weeks for routine postop follow-up and discussion of sclerotherapy   -instructed to contact the office w/new symptoms or changes in incisions  Diagnoses and all orders for this visit:    Varicose veins of left lower extremity with pain    Postoperative state    Other orders  -     divalproex sodium (DEPAKOTE ER) 500 mg 24 hr tablet; take 3 tablet by mouth at bedtime  -     gabapentin (NEURONTIN) 100 mg capsule; take 1 tablet by mouth EVERY AFTERNOON          Subjective:      Patient ID: Anastacia Lanes is a 49 y o  female  Pt is here today s/p multiple stab phlebectomies done 7/12/2021 by Dr Cuauhtemoc Grande  Pt denies any pain  She describes the feeling in her leg as a throbbing stinging feeling when she was on her feet too much  Dressing were removed  Stab sites are clean and dry  Pt c/o headache, dizziness and fatigue since anesthesia  Pt is a former smoker       48 y/o female nonsmoker w/bipolar disorder, ETOH abuse and symptomatic painful LLE (left medial calf and anterior shin) reticular/truncal varicosities without deep or superficial venous incompetence on LEVDR who is now s/p LLE stab phlebectomies x 10-20 by Dr Prajapati Flow 7/12/21  Pt presents for postoperative f/u and dressing removal   Pt doing well  All left lower leg stab sites healing well  Operative dressing removed in the office today without incident  Patient denies fever, chills, chest pain, shortness of breath, dyspnea on exertion, PND, left lower extremity pain, drainage, bleeding, paresthesia, numbness or weakness  The following portions of the patient's history were reviewed and updated as appropriate: allergies, current medications, past family history, past medical history, past social history, past surgical history and problem list     Review of Systems   Constitutional: Positive for fatigue  HENT: Positive for sore throat  Eyes: Negative  Respiratory: Negative  Cardiovascular: Negative  Gastrointestinal: Positive for nausea  Endocrine: Negative  Genitourinary: Negative  Musculoskeletal: Negative  Skin: Negative  Allergic/Immunologic: Negative  Neurological: Positive for dizziness and headaches  Hematological: Bruises/bleeds easily  Psychiatric/Behavioral: The patient is nervous/anxious  Depression  Bi Polar disorder     I have personally reviewed the ROS entered by MA and agree as documented  Objective:      /80 (BP Location: Right arm, Patient Position: Sitting, Cuff Size: Standard)   Pulse 76   Temp 99 °F (37 2 °C) (Tympanic)   Ht 5' 4" (1 626 m)   Wt 67 kg (147 lb 9 6 oz)   LMP 06/22/2021   BMI 25 34 kg/m²          Physical Exam  Vitals and nursing note reviewed  Constitutional:       General: She is not in acute distress  Appearance: Normal appearance  She is normal weight  She is not ill-appearing, toxic-appearing or diaphoretic  Cardiovascular:      Rate and Rhythm: Normal rate and regular rhythm  Pulses: Normal pulses             Dorsalis pedis pulses are 2+ on the right side and 2+ on the left side       Heart sounds: Normal heart sounds  No murmur heard  No friction rub  No gallop  Comments: Left lower extremity warm, pink, motor and sensory intact and well perfused without cyanosis, pallor, rubor  All stab sites of left lower leg healing well with Steri-Strips intact  Minimal ecchymosis  No hematoma  No erythema, induration or drainage  Pulmonary:      Effort: Pulmonary effort is normal  No respiratory distress  Breath sounds: Normal breath sounds  No stridor  No wheezing, rhonchi or rales  Musculoskeletal:         General: No swelling  Left lower leg: No edema  Skin:     General: Skin is warm and dry  Findings: Bruising present  No erythema, lesion or rash  Neurological:      Mental Status: She is alert and oriented to person, place, and time  Psychiatric:         Mood and Affect: Mood normal          Thought Content:  Thought content normal

## 2021-09-14 ENCOUNTER — OFFICE VISIT (OUTPATIENT)
Dept: VASCULAR SURGERY | Facility: CLINIC | Age: 51
End: 2021-09-14

## 2021-09-14 VITALS
HEART RATE: 98 BPM | TEMPERATURE: 97.2 F | HEIGHT: 64 IN | WEIGHT: 150 LBS | SYSTOLIC BLOOD PRESSURE: 130 MMHG | DIASTOLIC BLOOD PRESSURE: 88 MMHG | BODY MASS INDEX: 25.61 KG/M2

## 2021-09-14 DIAGNOSIS — I83.812 VARICOSE VEINS OF LEFT LOWER EXTREMITY WITH PAIN: Primary | ICD-10-CM

## 2021-09-14 DIAGNOSIS — I83.93 SPIDER VEINS OF BOTH LOWER EXTREMITIES: ICD-10-CM

## 2021-09-14 PROCEDURE — 99024 POSTOP FOLLOW-UP VISIT: CPT | Performed by: SURGERY

## 2021-09-14 RX ORDER — TEMAZEPAM 15 MG/1
15 CAPSULE ORAL
COMMUNITY
Start: 2021-09-10

## 2021-09-14 NOTE — PROGRESS NOTES
Assessment/Plan:    Pt is a 50 yo F w/ eczema, bipolar, EtOH abuse, varicose veins, spider veins s/p LLE stab phlebectomies    Varicose veins of left lower extremity with pain  -LLE varicose veins with pain  -reviewed LLE reflux study which showed no venous insufficiency  -s/p stab phlebectomies 7/12/21 [place]  -well healed; has small hematoma at 1 stab site, still resolving  -will continue medical management including compression, elevation, activity, skin care, healthy weight    Spider veins of both lower extremities  -discussed sclerotherapy injections for spider veins for cosmesis and patient would like to go forward with this  -plan for full session, 5 vials, 0 5% asclera; mainly RLE but also some on L  -pt instructed to bring thigh high compression for appointment          Subjective:      Patient ID: Na West is a 49 y o  female  Patient is s/p multiple stab phlebs on LLE done on 7/12/21 by Dr Tammy Cifuentes  Patient still reports pain and 'pins and needles'  Patient is concerned over some discoloration and lumps  Incisions look clean and dry  HPI:    Patient presents as followup after LLE stab phlebecomies by Dr Tammy Cifuentes 7/12/21    Prior to surgery, patient had pain, tingling, varicose veins, heaviness, aching  She now complains of intermittent shooting pain in the legs  She has some lungs on the L shin  She has pain worse after being on her legs for a long time  She has "pins and needles" on the shin  She does have relief from her varicose veins/pain  She has some lumps at the anterior shin stab sites but all incisions are healed  SHe is still wearing compression  She is increasing activity/exercise  She elevates her legs in the evening  She is a healthy weight and is stable  She is on disability            The following portions of the patient's history were reviewed and updated as appropriate: allergies, current medications, past family history, past medical history, past social history, past surgical history and problem list     Review of Systems   Constitutional: Negative  HENT: Negative  Eyes: Negative  Respiratory: Negative  Negative for shortness of breath  Cardiovascular: Positive for leg swelling  Pain LLE   Gastrointestinal: Negative  Endocrine: Negative  Genitourinary: Negative  Musculoskeletal: Negative  Negative for gait problem  Skin: Negative for color change and wound  Allergic/Immunologic: Negative  Neurological: Negative  Negative for weakness and numbness  Hematological: Negative  Psychiatric/Behavioral: Negative  Objective:      /88 (BP Location: Left arm, Patient Position: Sitting, Cuff Size: Standard)   Pulse 98   Temp (!) 97 2 °F (36 2 °C) (Tympanic)   Ht 5' 4" (1 626 m)   Wt 68 kg (150 lb)   BMI 25 75 kg/m²          Physical Exam  Vitals and nursing note reviewed  Constitutional:       Appearance: She is well-developed  HENT:      Head: Normocephalic and atraumatic  Eyes:      Conjunctiva/sclera: Conjunctivae normal    Cardiovascular:      Rate and Rhythm: Normal rate and regular rhythm  Pulses:           Radial pulses are 2+ on the right side and 2+ on the left side  Dorsalis pedis pulses are 2+ on the right side and 2+ on the left side  Posterior tibial pulses are 2+ on the right side and 2+ on the left side  Heart sounds: Normal heart sounds  No murmur heard  Pulmonary:      Effort: Pulmonary effort is normal  No respiratory distress  Breath sounds: Normal breath sounds  No wheezing or rales  Abdominal:      General: There is no distension  Palpations: Abdomen is soft  Tenderness: There is no abdominal tenderness  There is no rebound  Musculoskeletal:         General: Normal range of motion  Cervical back: Normal range of motion and neck supple  Right lower leg: No edema  Left lower leg: No edema  Skin:     General: Skin is warm and dry  Comments: L leg stab sites well healed; there is a small hematoma deep to 1-2 of the stab sites wtihout overlying skin changes; mild tenderness; there are no residual varicose veins; there are some clusters of spider vein, mainly on the left leg but some on the R leg  Neurological:      Mental Status: She is alert and oriented to person, place, and time  Psychiatric:         Behavior: Behavior normal            I have reviewed and made appropriate changes to the review of systems input by the medical assistant      Vitals:    21 1108   BP: 130/88   BP Location: Left arm   Patient Position: Sitting   Cuff Size: Standard   Pulse: 98   Temp: (!) 97 2 °F (36 2 °C)   TempSrc: Tympanic   Weight: 68 kg (150 lb)   Height: 5' 4" (1 626 m)       Patient Active Problem List   Diagnosis    Bipolar 2 disorder (HCC)    Fatigue    Menorrhagia with regular cycle    Intrinsic eczema    Alcohol use disorder, severe, dependence (HCC)    Varicose veins of left lower extremity with pain       Past Surgical History:   Procedure Laterality Date     SECTION      VEIN LIGATION Left 2021    Procedure: multiple stab phlebectomies left lower extremity, no stripping and ligation no laser ablation;  Surgeon: Keyanna Leiva MD;  Location: [facility] MAIN OR;  Service: Vascular       Family History   Problem Relation Age of Onset    No Known Problems Mother     Diabetes Father     Hypertension Father        Social History     Socioeconomic History    Marital status: /Civil Union     Spouse name: Not on file    Number of children: 3    Years of education: Not on file    Highest education level: Not on file   Occupational History    Occupation: Unemployed   Tobacco Use    Smoking status: Former Smoker    Smokeless tobacco: Never Used    Tobacco comment: quit 6 months ago   Vaping Use    Vaping Use: Never used   Substance and Sexual Activity    Alcohol use: Not Currently     Alcohol/week: 6 0 standard drinks Types: 6 Cans of beer per week     Comment: hx ETOH abuse    Drug use: No    Sexual activity: Yes     Partners: Male   Other Topics Concern    Not on file   Social History Narrative    Not on file     Social Determinants of Health     Financial Resource Strain:     Difficulty of Paying Living Expenses:    Food Insecurity:     Worried About Running Out of Food in the Last Year:     [address] in the Last Year:    Transportation Needs:     Lack of Transportation (Medical):      Lack of Transportation (Non-Medical):    Physical Activity:     Days of Exercise per Week:     Minutes of Exercise per Session:    Stress:     Feeling of Stress :    Social Connections:     Frequency of Communication with Friends and Family:     Frequency of Social Gatherings with Friends and Family:     Attends [employer] Services:     Active Member of Clubs or Organizations:     Attends Club or Organization Meetings:     Marital Status:    Intimate Partner Violence:     Fear of Current or Ex-Partner:     Emotionally Abused:     Physically Abused:     Sexually Abused:        No Known Allergies      Current Outpatient Medications:     divalproex sodium (DEPAKOTE ER) 250 mg 24 hr tablet, Take 7 tablets (1,750 mg total) by mouth daily at bedtime, Disp: 210 tablet, Rfl: 0    divalproex sodium (DEPAKOTE ER) 500 mg 24 hr tablet, take 3 tablet by mouth at bedtime, Disp: , Rfl:     escitalopram (LEXAPRO) 20 mg tablet, Take 1 tablet (20 mg total) by mouth daily, Disp: 30 tablet, Rfl: 0    gabapentin (NEURONTIN) 100 mg capsule, take 1 tablet by mouth EVERY AFTERNOON, Disp: , Rfl:     gabapentin (NEURONTIN) 300 mg capsule, One po bid and 2 po q bedtime (Patient taking differently: 300 mg am/ 100 mg lunch/ 600 mg bedtime), Disp: 120 capsule, Rfl: 1    ibuprofen (MOTRIN) 200 mg tablet, Take by mouth every 6 (six) hours as needed for mild pain, Disp: , Rfl:     Multiple Vitamin (multivitamin) capsule, Take 1 capsule by mouth daily, Disp: , Rfl:     naltrexone (REVIA) 50 mg tablet, Take 0 5 tablets (25 mg total) by mouth daily, Disp: 15 tablet, Rfl: 1    temazepam (RESTORIL) 15 mg capsule, Take 15 mg by mouth daily at bedtime, Disp: , Rfl:

## 2021-09-14 NOTE — PATIENT INSTRUCTIONS
1) Venous disease  -you had a stab phlebectomy procedure by Dr Angelica Dinh on the left leg for varicose veins  -we are planning treatment of your spider veins next using sclerotherapy foam injections  -YOU MUST BRING THIGH HIGH COMPRESSION TO THIS INJECTION APPOINTMENT  -please continue to do medical management for your vein disease including compression, leg elevation, exercise, healthy weight, skin care    To buy compression online, try "[employer]" 15-20mmHg gradient compression is a light compression; My favorite is Sigvaris, soft opaque

## 2022-01-30 ENCOUNTER — OFFICE VISIT (OUTPATIENT)
Dept: URGENT CARE | Facility: CLINIC | Age: 52
End: 2022-01-30
Payer: COMMERCIAL

## 2022-01-30 VITALS
TEMPERATURE: 97.7 F | HEIGHT: 64 IN | HEART RATE: 56 BPM | SYSTOLIC BLOOD PRESSURE: 109 MMHG | DIASTOLIC BLOOD PRESSURE: 53 MMHG | WEIGHT: 140 LBS | OXYGEN SATURATION: 98 % | BODY MASS INDEX: 23.9 KG/M2

## 2022-01-30 DIAGNOSIS — R05.1 ACUTE COUGH: Primary | ICD-10-CM

## 2022-01-30 DIAGNOSIS — J01.90 ACUTE NON-RECURRENT SINUSITIS, UNSPECIFIED LOCATION: ICD-10-CM

## 2022-01-30 PROCEDURE — G0463 HOSPITAL OUTPT CLINIC VISIT: HCPCS

## 2022-01-30 PROCEDURE — 99213 OFFICE O/P EST LOW 20 MIN: CPT

## 2022-01-30 PROCEDURE — 87636 SARSCOV2 & INF A&B AMP PRB: CPT

## 2022-01-30 RX ORDER — AMOXICILLIN AND CLAVULANATE POTASSIUM 875; 125 MG/1; MG/1
1 TABLET, FILM COATED ORAL EVERY 12 HOURS SCHEDULED
Qty: 14 TABLET | Refills: 0 | Status: SHIPPED | OUTPATIENT
Start: 2022-01-30 | End: 2022-01-30

## 2022-01-30 RX ORDER — AMOXICILLIN AND CLAVULANATE POTASSIUM 875; 125 MG/1; MG/1
1 TABLET, FILM COATED ORAL EVERY 12 HOURS SCHEDULED
Qty: 14 TABLET | Refills: 0 | Status: SHIPPED | OUTPATIENT
Start: 2022-01-30 | End: 2022-02-06

## 2022-01-30 NOTE — PATIENT INSTRUCTIONS
Take antibiotic as directed  Use flonase nasal spray and saline nasal rinse  Cool mist humidifier  If you develop any new or concerning symptoms, please return or proceed to ER  Follow up with PCP in 3-5 days  Will test for COVID-19  Rest and drink extra fluids  Tylenol as needed for pain or fever  Over-the-counter cough and cold medications as needed  Recommended vitamins for Covid- vitamin D3 2000 IU oral daily, Vitamin C 1 gram oral q 12 hours and multivitamin daily  Follow up with PCP if no improvement  Go to the ER with any worsening symptoms, chest pain, shortness of breath, difficulty breathing, lethargy, confusion, dehydration or change in skin color  If Covid tests are negative, you may discontinue isolation when fever free for 24 hours without the use of a fever reducing agent  If covid test is positive, you may discontinue isolation 5 days after symptom onset and when fever free for 24 hours without the use of a fever reducing agent  Upon discontinuing isolation you must continue to wear a mask for an additional 5 days  [address]    Your healthcare provider and/or public health staff have evaluated you and have determined that you do not need to remain in the hospital at this time  At this time you can be isolated at home where you will be monitored by staff from your local or state health department  You should carefully follow the prevention and isolation steps below until a healthcare provider or local or state health department says that you can return to your normal activities  Stay home except to get medical care    People who are mildly ill with COVID-19 are able to isolate at home during their illness  You should restrict activities outside your home, except for getting medical care  Do not go to work, school, or public areas  Avoid using public transportation, ride-sharing, or taxis      Separate yourself from other people and animals in your home    People: As much as possible, you should stay in a specific room and away from other people in your home  Also, you should use a separate bathroom, if available  Animals: You should restrict contact with pets and other animals while you are sick with COVID-19, just like you would around other people  Although there have not been reports of pets or other animals becoming sick with COVID-19, it is still recommended that people sick with COVID-19 limit contact with animals until more information is known about the virus  When possible, have another member of your household care for your animals while you are sick  If you are sick with COVID-19, avoid contact with your pet, including petting, snuggling, being kissed or licked, and sharing food  If you must care for your pet or be around animals while you are sick, wash your hands before and after you interact with pets and wear a facemask  See COVID-19 and Animals for more information  Call ahead before visiting your doctor    If you have a medical appointment, call the healthcare provider and tell them that you have or may have COVID-19  This will help the healthcare providers office take steps to keep other people from getting infected or exposed  Wear a facemask    You should wear a facemask when you are around other people (e g , sharing a room or vehicle) or pets and before you enter a healthcare providers office  If you are not able to wear a facemask (for example, because it causes trouble breathing), then people who live with you should not stay in the same room with you, or they should wear a facemask if they enter your room  Cover your coughs and sneezes    Cover your mouth and nose with a tissue when you cough or sneeze  Throw used tissues in a lined trash can   Immediately wash your hands with soap and water for at least 20 seconds or, if soap and water are not available, clean your hands with an alcohol-based hand  that contains at least 60% alcohol  Clean your hands often    Wash your hands often with soap and water for at least 20 seconds, especially after blowing your nose, coughing, or sneezing; going to the bathroom; and before eating or preparing food  If soap and water are not readily available, use an alcohol-based hand  with at least 60% alcohol, covering all surfaces of your hands and rubbing them together until they feel dry  Soap and water are the best option if hands are visibly dirty  Avoid touching your eyes, nose, and mouth with unwashed hands  Avoid sharing personal household items    You should not share dishes, drinking glasses, cups, eating utensils, towels, or bedding with other people or pets in your home  After using these items, they should be washed thoroughly with soap and water  Clean all high-touch surfaces everyday    High touch surfaces include counters, tabletops, doorknobs, bathroom fixtures, toilets, phones, keyboards, tablets, and bedside tables  Also, clean any surfaces that may have blood, stool, or body fluids on them  Use a household cleaning spray or wipe, according to the label instructions  Labels contain instructions for safe and effective use of the cleaning product including precautions you should take when applying the product, such as wearing gloves and making sure you have good ventilation during use of the product  Monitor your symptoms    Seek prompt medical attention if your illness is worsening (e g , difficulty breathing)  Before seeking care, call your healthcare provider and tell them that you have, or are being evaluated for, COVID-19  Put on a facemask before you enter the facility  These steps will help the healthcare providers office to keep other people in the office or waiting room from getting infected or exposed  Ask your healthcare provider to call the local or state health department   Persons who are placed under active monitoring or facilitated self-monitoring should follow instructions provided by their local health department or occupational health professionals, as appropriate  If you have a medical emergency and need to call 911, notify the dispatch personnel that you have, or are being evaluated for COVID-19  If possible, put on a facemask before emergency medical services arrive  Discontinuing home isolation    Patients with confirmed COVID-19 should remain under home isolation precautions until the following conditions are met:   - They have had no fever for at least 24 hours (that is one full day of no fever without the use medicine that reduces fevers)  AND  - other symptoms have improved (for example, when their cough or shortness of breath have improved)  AND  - If had mild or moderate illness, at least 10 days have passed since their symptoms first appeared or if severe illness (needed oxygen) or immunosuppressed, at least 20 days have passed since symptoms first appeared  Patients with confirmed COVID-19 should also notify close contacts (including their workplace) and ask that they self-quarantine  Currently, close contact is defined as being within 6 feet for 15 minutes or more from the period 24 hours starting 48 hours before symptom onset to the time at which the patient went into isolation  Close contacts of patients diagnosed with COVID-19 should be instructed by the patient to self-quarantine for 14 days from the last time of their last contact with the patient       Source: [employer]

## 2022-01-30 NOTE — PROGRESS NOTES
[facility]        NAME: Mohan Dickson is a 49 y o  female  : 1970    MRN: [id number]  DATE: 2022  TIME: 1:41 PM    Assessment and Plan   Acute cough [R05 1]  1  Acute cough  Covid/Flu-Office Collect   2  Acute non-recurrent sinusitis, unspecified location  amoxicillin-clavulanate (AUGMENTIN) 875-125 mg per tablet    DISCONTINUED: amoxicillin-clavulanate (AUGMENTIN) 875-125 mg per tablet         Patient Instructions     Patient Instructions   Take antibiotic as directed  Use flonase nasal spray and saline nasal rinse  Cool mist humidifier  If you develop any new or concerning symptoms, please return or proceed to ER  Follow up with PCP in 3-5 days  Will test for COVID-19  Rest and drink extra fluids  Tylenol as needed for pain or fever  Over-the-counter cough and cold medications as needed  Recommended vitamins for Covid- vitamin D3 2000 IU oral daily, Vitamin C 1 gram oral q 12 hours and multivitamin daily  Follow up with PCP if no improvement  Go to the ER with any worsening symptoms, chest pain, shortness of breath, difficulty breathing, lethargy, confusion, dehydration or change in skin color  If Covid tests are negative, you may discontinue isolation when fever free for 24 hours without the use of a fever reducing agent  If covid test is positive, you may discontinue isolation 5 days after symptom onset and when fever free for 24 hours without the use of a fever reducing agent  Upon discontinuing isolation you must continue to wear a mask for an additional 5 days  [address]    Your healthcare provider and/or public health staff have evaluated you and have determined that you do not need to remain in the hospital at this time  At this time you can be isolated at home where you will be monitored by staff from your local or state health department   You should carefully follow the prevention and isolation steps below until a healthcare provider or local or state health department says that you can return to your normal activities  Stay home except to get medical care    People who are mildly ill with COVID-19 are able to isolate at home during their illness  You should restrict activities outside your home, except for getting medical care  Do not go to work, school, or public areas  Avoid using public transportation, ride-sharing, or taxis  Separate yourself from other people and animals in your home    People: As much as possible, you should stay in a specific room and away from other people in your home  Also, you should use a separate bathroom, if available  Animals: You should restrict contact with pets and other animals while you are sick with COVID-19, just like you would around other people  Although there have not been reports of pets or other animals becoming sick with COVID-19, it is still recommended that people sick with COVID-19 limit contact with animals until more information is known about the virus  When possible, have another member of your household care for your animals while you are sick  If you are sick with COVID-19, avoid contact with your pet, including petting, snuggling, being kissed or licked, and sharing food  If you must care for your pet or be around animals while you are sick, wash your hands before and after you interact with pets and wear a facemask  See COVID-19 and Animals for more information  Call ahead before visiting your doctor    If you have a medical appointment, call the healthcare provider and tell them that you have or may have COVID-19  This will help the healthcare providers office take steps to keep other people from getting infected or exposed  Wear a facemask    You should wear a facemask when you are around other people (e g , sharing a room or vehicle) or pets and before you enter a healthcare providers office   If you are not able to wear a facemask (for example, because it causes trouble breathing), then people who live with you should not stay in the same room with you, or they should wear a facemask if they enter your room  Cover your coughs and sneezes    Cover your mouth and nose with a tissue when you cough or sneeze  Throw used tissues in a lined trash can  Immediately wash your hands with soap and water for at least 20 seconds or, if soap and water are not available, clean your hands with an alcohol-based hand  that contains at least 60% alcohol  Clean your hands often    Wash your hands often with soap and water for at least 20 seconds, especially after blowing your nose, coughing, or sneezing; going to the bathroom; and before eating or preparing food  If soap and water are not readily available, use an alcohol-based hand  with at least 60% alcohol, covering all surfaces of your hands and rubbing them together until they feel dry  Soap and water are the best option if hands are visibly dirty  Avoid touching your eyes, nose, and mouth with unwashed hands  Avoid sharing personal household items    You should not share dishes, drinking glasses, cups, eating utensils, towels, or bedding with other people or pets in your home  After using these items, they should be washed thoroughly with soap and water  Clean all high-touch surfaces everyday    High touch surfaces include counters, tabletops, doorknobs, bathroom fixtures, toilets, phones, keyboards, tablets, and bedside tables  Also, clean any surfaces that may have blood, stool, or body fluids on them  Use a household cleaning spray or wipe, according to the label instructions  Labels contain instructions for safe and effective use of the cleaning product including precautions you should take when applying the product, such as wearing gloves and making sure you have good ventilation during use of the product      Monitor your symptoms    Seek prompt medical attention if your illness is worsening (e g , difficulty breathing)  Before seeking care, call your healthcare provider and tell them that you have, or are being evaluated for, COVID-19  Put on a facemask before you enter the facility  These steps will help the healthcare providers office to keep other people in the office or waiting room from getting infected or exposed  Ask your healthcare provider to call the local or [facility] health department  Persons who are placed under active monitoring or facilitated self-monitoring should follow instructions provided by their local health department or occupational health professionals, as appropriate  If you have a medical emergency and need to call 911, notify the dispatch personnel that you have, or are being evaluated for COVID-19  If possible, put on a facemask before emergency medical services arrive  Discontinuing home isolation    Patients with confirmed COVID-19 should remain under home isolation precautions until the following conditions are met:   - They have had no fever for at least 24 hours (that is one full day of no fever without the use medicine that reduces fevers)  AND  - other symptoms have improved (for example, when their cough or shortness of breath have improved)  AND  - If had mild or moderate illness, at least 10 days have passed since their symptoms first appeared or if severe illness (needed oxygen) or immunosuppressed, at least 20 days have passed since symptoms first appeared  Patients with confirmed COVID-19 should also notify close contacts (including their workplace) and ask that they self-quarantine  Currently, close contact is defined as being within 6 feet for 15 minutes or more from the period 24 hours starting 48 hours before symptom onset to the time at which the patient went into isolation  Close contacts of patients diagnosed with COVID-19 should be instructed by the patient to self-quarantine for 14 days from the last time of their last contact with the patient       Source: Jyoti fi         Follow up with PCP in 3-5 days  Proceed to  ER if symptoms worsen  Chief Complaint     Chief Complaint   Patient presents with    Chest Pain     tightness    Cough    Headache     7 days    Fatigue    Ear Problem     pressure         History of Present Illness       CORNELIA Tamayo is a 49 y o  female who presents today with headache, b/l ear pressure, sinus pressure, cough, body aches, SOB and fatigue x 1 week  Reports symptoms are worsening  Unknown fevers, but does report waking up sweaty on occasion  Denies N/V/D  Is taking Mucinex and Aleve with no improvement in symptoms  No known sick contacts  Patient is Covid vaccinated  Review of Systems   Review of Systems   Constitutional: Positive for fatigue  Negative for appetite change, chills and fever  HENT: Positive for ear pain (b/l ear pressure), sinus pressure and sore throat  Negative for congestion, postnasal drip, rhinorrhea and sinus pain  Eyes: Negative for pain, discharge and visual disturbance  Respiratory: Positive for cough (productive brown sputum), chest tightness and shortness of breath  Negative for wheezing  Cardiovascular: Negative for chest pain and palpitations  Gastrointestinal: Negative for abdominal pain, diarrhea, nausea and vomiting  Musculoskeletal: Positive for arthralgias and myalgias  Skin: Negative for color change and rash  Neurological: Positive for headaches  Negative for weakness           Current Medications       Current Outpatient Medications:     divalproex sodium (DEPAKOTE ER) 500 mg 24 hr tablet, take 3 tablet by mouth at bedtime, Disp: , Rfl:     escitalopram (LEXAPRO) 20 mg tablet, Take 1 tablet (20 mg total) by mouth daily, Disp: 30 tablet, Rfl: 0    ibuprofen (MOTRIN) 200 mg tablet, Take by mouth every 6 (six) hours as needed for mild pain, Disp: , Rfl:     Multiple Vitamin (multivitamin) capsule, Take 1 capsule by mouth daily, Disp: , Rfl:     amoxicillin-clavulanate (AUGMENTIN) 875-125 mg per tablet, Take 1 tablet by mouth every 12 (twelve) hours for 7 days, Disp: 14 tablet, Rfl: 0    divalproex sodium (DEPAKOTE ER) 250 mg 24 hr tablet, Take 7 tablets (1,750 mg total) by mouth daily at bedtime, Disp: 210 tablet, Rfl: 0    gabapentin (NEURONTIN) 100 mg capsule, take 1 tablet by mouth EVERY AFTERNOON (Patient not taking: Reported on 2022), Disp: , Rfl:     gabapentin (NEURONTIN) 300 mg capsule, One po bid and 2 po q bedtime (Patient not taking: Reported on 2022 ), Disp: 120 capsule, Rfl: 1    naltrexone (REVIA) 50 mg tablet, Take 0 5 tablets (25 mg total) by mouth daily, Disp: 15 tablet, Rfl: 1    temazepam (RESTORIL) 15 mg capsule, Take 15 mg by mouth daily at bedtime (Patient not taking: Reported on 2022 ), Disp: , Rfl:     Current Allergies     Allergies as of 2022    (No Known Allergies)            The following portions of the patient's history were reviewed and updated as appropriate: allergies, current medications, past family history, past medical history, past social history, past surgical history and problem list      Past Medical History:   Diagnosis Date    Alcohol abuse     Anxiety     Bipolar 1 disorder ([facility] [address] )     Depression     Varicose veins of left lower extremity        Past Surgical History:   Procedure Laterality Date     SECTION      VEIN LIGATION Left 2021    Procedure: multiple stab phlebectomies left lower extremity, no stripping and ligation no laser ablation;  Surgeon: Jose Coyne MD;  Location: [address] [place] [address];  Service: Vascular       Family History   Problem Relation Age of Onset    No Known Problems Mother     Diabetes Father     Hypertension Father          Medications have been verified          Objective   /53   Pulse 56   Temp 97 7 °F (36 5 °C)   Ht 5' 4" (1 626 m)   Wt 63 5 kg (140 lb)   SpO2 98%   BMI 24 03 kg/m² Physical Exam     Physical Exam  Vitals and nursing note reviewed  Constitutional:       General: She is not in acute distress  Appearance: Normal appearance  HENT:      Head: Normocephalic and atraumatic  Right Ear: Tympanic membrane and ear canal normal       Left Ear: Tympanic membrane and ear canal normal       Mouth/Throat:      Mouth: Mucous membranes are moist       Pharynx: Posterior oropharyngeal erythema present  Cardiovascular:      Rate and Rhythm: Normal rate and regular rhythm  Heart sounds: Normal heart sounds  Pulmonary:      Effort: Pulmonary effort is normal       Breath sounds: Normal breath sounds  No wheezing, rhonchi or rales  Lymphadenopathy:      Cervical: No cervical adenopathy  Psychiatric:         Behavior: Behavior normal  Behavior is cooperative

## 2022-01-31 LAB
FLUAV RNA RESP QL NAA+PROBE: NEGATIVE
FLUBV RNA RESP QL NAA+PROBE: NEGATIVE
SARS-COV-2 RNA RESP QL NAA+PROBE: NEGATIVE

## 2023-01-06 ENCOUNTER — HOSPITAL ENCOUNTER (OUTPATIENT)
Dept: MAMMOGRAPHY | Facility: HOSPITAL | Age: 53
End: 2023-01-06

## 2023-01-06 VITALS — WEIGHT: 140 LBS | HEIGHT: 64 IN | BODY MASS INDEX: 23.9 KG/M2

## 2023-01-06 DIAGNOSIS — Z12.31 ENCOUNTER FOR SCREENING MAMMOGRAM FOR MALIGNANT NEOPLASM OF BREAST: ICD-10-CM

## 2023-05-09 ENCOUNTER — OFFICE VISIT (OUTPATIENT)
Dept: PSYCHOLOGY | Facility: CLINIC | Age: 53
End: 2023-05-09

## 2023-05-09 ENCOUNTER — TELEMEDICINE (OUTPATIENT)
Dept: PSYCHIATRY | Facility: CLINIC | Age: 53
End: 2023-05-09

## 2023-05-09 DIAGNOSIS — F10.21 ALCOHOL USE DISORDER, SEVERE, IN EARLY REMISSION (HCC): ICD-10-CM

## 2023-05-09 DIAGNOSIS — F41.9 ANXIETY: ICD-10-CM

## 2023-05-09 DIAGNOSIS — F31.4 BIPOLAR I DISORDER, SEVERE, CURRENT OR MOST RECENT EPISODE DEPRESSED, WITH MIXED FEATURES (HCC): Primary | ICD-10-CM

## 2023-05-09 PROBLEM — F31.9 BIPOLAR 1 DISORDER, DEPRESSED (HCC): Status: ACTIVE | Noted: 2023-03-15

## 2023-05-09 RX ORDER — CLONIDINE HYDROCHLORIDE 0.1 MG/1
0.1 TABLET ORAL 2 TIMES DAILY PRN
Qty: 60 TABLET | Refills: 1 | Status: SHIPPED | OUTPATIENT
Start: 2023-05-09

## 2023-05-09 RX ORDER — GABAPENTIN 300 MG/1
300 CAPSULE ORAL 3 TIMES DAILY
Qty: 90 CAPSULE | Refills: 1 | Status: SHIPPED | OUTPATIENT
Start: 2023-05-09

## 2023-05-09 RX ORDER — LURASIDONE HYDROCHLORIDE 20 MG/1
20 TABLET, FILM COATED ORAL
COMMUNITY

## 2023-05-09 RX ORDER — BUPROPION HYDROCHLORIDE 150 MG/1
150 TABLET ORAL DAILY
Qty: 7 TABLET | Refills: 0 | Status: SHIPPED | OUTPATIENT
Start: 2023-05-09 | End: 2023-05-16

## 2023-05-09 NOTE — PSYCH
Virtual Regular Visit    Verification of patient location:    Patient is located at Home in the following state in which I hold an active license [place]      Assessment/Plan:    Problem List Items Addressed This Visit        Other    Bipolar I disorder, severe, current or most recent episode depressed, with mixed features ([facility] [address] ) - Primary    Alcohol use disorder, severe, in early remission ([facility] [address] )    Anxiety       Goals addressed in session:           Reason for visit is PHP VIRTUAL GROUP DUE TO virtual preference of care         Encounter provider Campbell Sawyer    Provider located at [address] [place] [address]  [address] [facility] [place] [id number]      Recent Visits  No visits were found meeting these conditions  Showing recent visits within past 7 days and meeting all other requirements  Today's Visits  Date Type Provider Dept   05/09/23 Office Visit [address] PARTIAL PSYCH GROUP THERAPY [facility]   Showing today's visits and meeting all other requirements  Future Appointments  No visits were found meeting these conditions  Showing future appointments within next 150 days and meeting all other requirements       The patient was identified by name and date of birth  Karen Tai was informed that this is a telemedicine visit and that the visit is being conducted [employer]  She agrees to proceed     My office door was closed  No one else was in the room  She acknowledged consent and understanding of privacy and security of the video platform  The patient has agreed to participate and understands they can discontinue the visit at any time  Patient is aware this is a billable service  Subjective  Karen Tai is a 47 y o  female          HPI     Past Medical History:   Diagnosis Date   • Alcohol abuse    • Anxiety    • Bipolar 1 disorder ([facility] [address] )    • Depression    • Varicose veins of left lower extremity        Past Surgical History:   Procedure Laterality Date   •  SECTION     • VEIN LIGATION Left 2021    Procedure: multiple stab phlebectomies left lower extremity, no stripping and ligation no laser ablation;  Surgeon: Harleen Ann MD;  Location: [facility] MAIN OR;  Service: Vascular       Current Outpatient Medications   Medication Sig Dispense Refill   • buPROPion (Wellbutrin XL) 150 mg 24 hr tablet Take 1 tablet (150 mg total) by mouth daily 7 tablet 0   • cloNIDine (Catapres) 0 1 mg tablet Take 1 tablet (0 1 mg total) by mouth 2 (two) times a day as needed (anxiety) 60 tablet 1   • divalproex sodium (DEPAKOTE ER) 500 mg 24 hr tablet Take 1,750 mg by mouth daily at bedtime     • gabapentin (Neurontin) 300 mg capsule Take 1 capsule (300 mg total) by mouth 3 (three) times a day 90 capsule 1   • ibuprofen (MOTRIN) 200 mg tablet Take by mouth every 6 (six) hours as needed for mild pain     • lurasidone (Latuda) 20 mg tablet Take 20 mg by mouth daily with breakfast     • Multiple Vitamin (multivitamin) capsule Take 1 capsule by mouth daily       No current facility-administered medications for this visit  No Known Allergies    Review of Systems    Video Exam    There were no vitals filed for this visit  Physical Exam     I spent FOUR GROUP HOURS PLUS CASE MANAGEMENT minutes with patient today in which greater than 50% of the time was spent in counseling/coordination of care regarding PHP - SEE NOTES

## 2023-05-09 NOTE — PSYCH
"Subjective:     Patient ID: Radha Waterman is a 47 y o  female  [facility] Clinical Progress Notes      Specialized Services Documentation  Therapist must complete separate progress note for each specific clinical activity in which the individual participated during the day  Allied Therapy   Group was facilitated virtually in a private office using HIPAA compliant and approved [employer] Teams  Radha Waterman consented to the use of tele-video modality of treatment and was virtually present for group allied therapy  [id number] Radha Waterman actively shared in [facility] group focused on universal human needs  Madhuri Rosales was observed to be engaged in therapist led discussion on what the different categories of universal human needs are  Group engaged in damien analyses to \"Human\", \"Imagine\" and \"This is me\" to correspond with the different categories (well  being, connection, and self-expression)  Madhuri Rsoales listed \"self-love\" as a need in a song writing activity to \"Everybody\"  Some initial effort noted toward treatment goal  Continue AT to encourage development and practice of human needs     Tx Plan Objective: 1 1,1 2,1 4, Therapist:  KAYLIN Motley    "

## 2023-05-09 NOTE — BH TREATMENT PLAN
"Assessment/Plan:      Diagnoses and all orders for this visit:    Bipolar I disorder, severe, current or most recent episode depressed, with mixed features ([facility] [address] )    Alcohol use disorder, severe, in early remission [facility])    Anxiety          Subjective:     Patient ID: Stacey Lee is a 47 y o  female  [facility] Treatment Plan   AREAS OF NEED: Bipolar 1 disorder,severe, current or most recent episode depressed with mixed features  Alcohol use disorder severe in early remission and anxiety as evidenced by racing and ruminating thoughts, being fidgety, decrease interest, decrease in concentration , worrying, lack of energy - just wanting to stay in bed all day, decrease in hopelessness, and recent alcohol abuse due to relapse in alcohol use  Date Initiated: 05/09/23    Strengths: \"determined to get better, good mom and good wife  \"    LONG TERM GOAL:   Date Initiated: 05/09/23  1 0 I will identify and share two ways I feel relief of anxiety symptoms and improvement in ability to function independently  Target Date: 6/6/23  Completion Date:       SHORT TERM OBJECTIVES:     Date Initiated: 05/09/23  1 1 I will identify emotions at least once a day and will implement an appropriate coping skill that will help regulate that emotion  Revision Date:   Target Date: 5/18/23  Completion Date:     Date Initiated: 05/09/23  1 2 I will share examples of how I am practicing acceptance and describe the challenges I faced in doing so  Revision Date:   Target Date: 5/18/23  Completion Date:    Date Initiated: 05/09/23  1 3 I will take medications as prescribed and share questions and concerns if arise  Revision Date:  Target Date: 5/18/23  Completion Date:     Date Initiated: 05/09/23  1 4 I will identify 3 ways my supports can assist in my recovery and agree to staff/support contact as indicated      Revision Date:  Target Date: 5/18/23  Completion Date:          7 DAY REVISION:    Date Initiated:  Revision Date: " Target Date:   Completion Date:      PSYCHIATRY:  Date Initiated:  05/09/23  Medication Management and Education       Revision Date:       The person(s) responsible for carrying out the plan is Estelle Singleton DO    NURSING/SYMPTOM EDUCATION:   Date Initiated: 05/09/23  1 1, 1 2  1 3, 1 4 This RN/Or Therapist will provide wellness/symptoms and skill education groups three to five days weekly to educate Catrachita Calles on signs and symptoms of diagnoses, skills to manage, and medication questions that will be addressed by the treatment team     Revision date: The person(s) responsible for carrying out the plan is CATHY Dumas, LSW ; Chet Elias, RN, BSN    PSYCHOLOGY:   Date Initiated: 05/09/23       1 1, 1 2, 1 4 Provide psychotherapy group 5 times per week to allow opportunity for Catrachita Calles  to explore stressors and ways of coping  Revision Date:   The person(s) responsible for carrying out the plan is CATHY Dumas,RATNA; Jayesh Mead    ALLIED THERAPY:   Date Initiated: 05/09/23  1 1,1 2 Engage Catrachita Calles in AT group 5 times weekly to encourage development and use of wellness tools to decrease symptoms and promote recovery through meaningful activity  Revision Date:       The person(s) responsible for carrying out the plan is KAYLIN Smith ; KAYLIN Quinn    CASE MANAGEMENT:   Date Initiated: 05/09/23      1 0 This  will meet with Catrachita Calles  3-4 times weekly to assess treatment progress, discharge planning, connection to community supports and UR as indicated  Revision Date:   The person(s) responsible for carrying out the plan is Pamela Mak    TREATMENT REVIEW/COMMENTS:     DISCHARGE CRITERIA: Identify 3 signs of progress and complete relapse prevention plan  DISCHARGE PLAN: Connect with identified outpatient providers     Estimated Length of Stay: 10 treatment days             Diagnosis and Treatment Plan explained to Elba Araujo relates understanding diagnosis and is agreeable to Treatment Plan  CLIENT COMMENTS / Please share your thoughts, feelings, need and/or experiences regarding your treatment plan with Staff  Please see follow up note with comments  Signatures can be found on [facility] Treatment plan consent form

## 2023-05-09 NOTE — PSYCH
Initial Psychiatric Evaluation- Behavioral Health   Beth Gruber 47 y o  female MRN: [id number]      REQUIRED DOCUMENTATION:      1  This service was provided via Telemedicine  2  Provider located at [facility]  3  TeleMed provider: Misha Agosto DO  4  Patient located in [place], for which this provider is a licensed medical practitioner  5  Identify all parties in room with patient during tele consult: none  6  Patient was then informed that this was a Telemedicine visit and that the exam was being conducted confidentially over secure lines  My office door was closed  No one else was in the room  Patient acknowledged consent and understanding of privacy and security of the Telemedicine visit, and gave us permission to have the assistant stay in the room in order to assist with the history and to conduct the exam   I informed the patient that I have reviewed their record in Epic and presented the opportunity for them to ask any questions regarding the visit today  The patient agreed to participate  Visit Time    Visit Start Time: 0830  Visit Stop Time: 0930  Total Visit Duration: 60 minutes    Virtual Regular Visit    Verification of patient location:    Patient is located in the following state in which I hold an active license [place]    Assessment/Plan:    Problem List Items Addressed This Visit        Other    RESOLVED: Bipolar 2 disorder ([facility] ) - Primary    Relevant Medications    lurasidone (Latuda) 20 mg tablet    cloNIDine (Catapres) 0 1 mg tablet    gabapentin (Neurontin) 300 mg capsule    buPROPion (Wellbutrin XL) 150 mg 24 hr tablet   Other Visit Diagnoses     Alcohol use disorder, severe, in early remission ([facility] )        Anxiety              Reason for visit is No chief complaint on file         Encounter provider Myles Vega DO    Provider located at  [address] [place] [address] E  [address] [facility] "[id number]  [phone]    Recent Visits  No visits were found meeting these conditions  Showing recent visits within past 7 days and meeting all other requirements  Today's Visits  Date Type Provider Dept   05/09/23 Telemedicine Sam Wilder DO [facility] today's visits and meeting all other requirements  Future Appointments  No visits were found meeting these conditions  Showing future appointments within next 150 days and meeting all other requirements       The patient was identified by name and date of birth  Sulma Drew was informed that this is a telemedicine visit and that the visit is being conducted through the [employer]  She agrees to proceed     My office door was closed  No one else was in the room  She acknowledged consent and understanding of privacy and security of the video platform  The patient has agreed to participate and understands they can discontinue the visit at any time  Patient is aware this is a billable service  HPI     Sulma Drew is a 47 y o  female with past psychiatric history of bipolar disorder and anxiety is admitted to [facility] referred by [facility] inpatient unit  Dasha Santos reports she is struggling with anxiety  She states she uses alcohol to cope, but has been sober since 5/1  She states she was hospitalized and discharged on Friday, but states they did not change her medications  She states she does not feel her medications are working well for her, as she is still feeling anxious  She states she would use alcohol to cope with the anxiety, but has not had a drink since she went to the ER on 5/1  States she will get racing thoughts and feel anxious, \"it's hard to explain\"  States she feels she sometimes \"makes stuff up\" to worry about, but states things are good at home  Denies any history of trauma  States in March her Wellbutrin was discontinued, which she states has made her feel worse    States she " "was put on Lurasidone, but hasn't been taking with food  States she does sleep well  She admits that she does have a history of bipolar disorder, and has had episodes of kenji in the past   She states that she had many mood swings when she was a teenager, but things began to get worse over the years  He feels that she has used alcohol to cope with her mental health over the years  She admits she is well connected to [facility], and already attended on this morning  States she started drinking excessively after her children were born  States she was using alcohol to cope with anxiety and depression  Went to rehab when 28years old, and stayed sober for several months  She states her longest point of sobriety was 2 years, but she was never an everyday drinker  Denies any history of withdrawals or delirium tremens  States she feels her unstable mental health also really caused more drinking too  Started seeing [facility] in 2008 after birth of her son  She states that she has been on Depakote for many years, and has had other trials of medications as well  She states that she believes she was on Lexapro in the past, and we confirmed this when she was in her last partial hospitalization program here in 2020  She states that she was sober for 8 months after that  States her  is very supportive  She states that part of the reason she stayed sober through this past weekend was because of her family and their support  She denies thoughts to hurt himself or anyone else, denies any hallucinations  She states that she is [place] drinking, and she is hopeful that this program will provide her more coping skills  She states that it worked well for her in the past   Psychosocial Stressors include alcohol relapse recently       Psychiatric Review Of Systems:    Appetite: no change  Adverse eating: no  Weight changes: no  Insomnia/sleeplessness: no  Fatigue/anergy: yes  Anhedonia/lack of interest: " no  Attention/concentration: decreased, due to anxiety  Psychomotor agitation/retardation: no  Somatic symptoms: no  Anxiety/panic attack: worrying daily  Rachel/hypomania: past manic episodes, past mixed episodes, significant mood swings, lasting 7 or more days in a row  Hopelessness/helplessness/worthlessness: no  Self-injurious behavior/high-risk behavior: no  Suicidal ideation: no  Homicidal ideation: no  Auditory hallucinations: no  Visual hallucinations: no  Other perceptual disturbances: no  Delusional thinking: no  Obsessive/compulsive symptoms: no    Review Of Systems:    Constitutional negative   ENT negative   Cardiovascular negative   Respiratory negative   Gastrointestinal negative   Genitourinary negative   Musculoskeletal negative   Integumentary negative   Neurological negative   Endocrine negative   Pain none   Pain Level    0/10   Other Symptoms none, all other systems are negative       Past Psychiatric History:     Previous inpatient psychiatric admissions: several times in the past in [place] in 2020, [facility] in 2012  Most recently at [facility] twice in March and then May 2023 on voluntary admissions  Previous inpatient/outpatient substance abuse rehabilitation: Once in 2008  Very involved in [place] [id number]  Present/previous outpatient psychiatric treatment: Goes to [facility] for past several years  Present/previous psychotherapy: Has done PHP in the past in 2020  History of suicidal attempts/gestures: none  History of violence/aggressive behaviors: none  Past Psychiatric Medication Trials: Depakote for many years  Has tried Wellbutrin, Lexapro  Denies trial of Lithium or lamictal   Just recently lowered dose of gabapentin    States she had side effects from Abilify and Seroquel in the past      Medications:     Current Outpatient Medications:   •  buPROPion (Wellbutrin XL) 150 mg 24 hr tablet, Take 1 tablet (150 mg total) by mouth daily, Disp: 7 tablet, Rfl: 0  •  cloNIDine (Catapres) 0 1 mg tablet, Take 1 tablet (0 1 mg total) by mouth 2 (two) times a day as needed (anxiety), Disp: 60 tablet, Rfl: 1  •  divalproex sodium (DEPAKOTE ER) 500 mg 24 hr tablet, Take 1,750 mg by mouth daily at bedtime, Disp: , Rfl:   •  gabapentin (Neurontin) 300 mg capsule, Take 1 capsule (300 mg total) by mouth 3 (three) times a day, Disp: 90 capsule, Rfl: 1  •  ibuprofen (MOTRIN) 200 mg tablet, Take by mouth every 6 (six) hours as needed for mild pain, Disp: , Rfl:   •  lurasidone (Latuda) 20 mg tablet, Take 20 mg by mouth daily with breakfast, Disp: , Rfl:   •  Multiple Vitamin (multivitamin) capsule, Take 1 capsule by mouth daily, Disp: , Rfl:     Family Psychiatric History:     Family History   Problem Relation Age of Onset   • No Known Problems Mother    • Diabetes Father    • Hypertension Father    • No Known Problems Sister    • No Known Problems Sister    • No Known Problems Daughter    • No Known Problems Maternal Grandmother    • No Known Problems Paternal Grandmother    • No Known Problems Maternal Aunt    • No Known Problems Maternal Aunt    • No Known Problems Maternal Aunt    • No Known Problems Maternal Aunt    • No Known Problems Maternal Aunt    • No Known Problems Maternal Aunt    • No Known Problems Paternal Aunt        Social History:  Education: high school diploma/GED  Learning Disabilities: none  Marital history:   Living arrangement, social support: The patient lives in home with significant other  Support systems: children, sister, aunts and uncles  Occupational History: unemployed  Functioning Relationships: good support system and good relationship with spouse or significant other    Other Pertinent History: None  Access to guns/weapons: none    Social History     Substance and Sexual Activity   Drug Use No       Traumatic History:   Abuse: denies  Other Traumatic Events: denies    Substance Abuse History:  States she does smoke cigarettes, has recently picked this back up after quitting for several years  Does drink alcohol, would episodically binge, no history of withdrawals  Past Medical History:   Diagnosis Date   • Alcohol abuse    • Anxiety    • Bipolar 1 disorder ([facility] )    • Depression    • Varicose veins of left lower extremity       Mental Status Evaluation:    Appearance age appropriate, casually dressed   Behavior cooperative, appears anxious, still at times guarded, good eye contact   Speech normal rate, normal volume, normal pitch   Mood dysphoric, anxious   Affect constricted   Thought Processes organized, goal directed   Associations intact associations   Thought Content no overt delusions   Perceptual Disturbances: no auditory hallucinations, no visual hallucinations   Abnormal Thoughts  Risk Potential Suicidal ideation - None  Homicidal ideation - None  Potential for aggression - No   Orientation oriented to person, place, time/date and situation   Memory recent and remote memory grossly intact   Consciousness alert and awake   Attention Span Concentration Span attention span and concentration are age appropriate   Intellect appears to be of average intelligence   Insight intact   Judgement intact   Muscle Strength and  Gait normal muscle strength and normal muscle tone, normal gait and normal balance   Motor Activity no abnormal movements   Language no difficulty naming common objects, no difficulty repeating a phrase, no difficulty writing a sentence   Fund of Knowledge adequate knowledge of current events  adequate fund of knowledge regarding past history  adequate fund of knowledge regarding vocabulary            Laboratory Results: I have personally reviewed all pertinent laboratory/tests results  Assessment:    Diagnoses and all orders for this visit:    Bipolar I disorder, severe, current or most recent episode depressed, with mixed features (HCC)  -     cloNIDine (Catapres) 0 1 mg tablet;  Take 1 tablet (0 1 mg total) by mouth 2 (two) times a day as needed (anxiety)  -     gabapentin (Neurontin) 300 mg capsule; Take 1 capsule (300 mg total) by mouth 3 (three) times a day  -     buPROPion (Wellbutrin XL) 150 mg 24 hr tablet; Take 1 tablet (150 mg total) by mouth daily    Alcohol use disorder, severe, in early remission (HCC)    Anxiety    Other orders  -     lurasidone (Latuda) 20 mg tablet; Take 20 mg by mouth daily with breakfast     Sylvie Colmenares is a 47year old female with a history of bipolar disorder, who has been using alcohol to cope for several years  She recently had a mixed episode, and began drinking again, prompting hospitalization at [facility] last week  She has been sober since 5/1, and would like to maintain sobriety  Her struggle is that her anxiety continues to bother her, and she will use alcohol to cope  Hopefully we can find other as needed's that work for her, and restabilize her on Wellbutrin that has worked well for her in the past   She does not appear to be in acute danger to herself or others, does appear future and goal oriented  Plan:  Medication changes: We will continue with Depakote 57434 mg nightly as well as Latuda 20mg daily, advised to take with food  We will continue with gabapentin but increase to 300 mg 3 times daily, patient reports having done well in the past when taking gabapentin 300 mg twice daily and 600 mg nightly  Will increase slowly  We will also restart Wellbutrin  mg daily, patient reports having tablets of 300 mg daily at home, advised to restart at 300 mg daily next week after titrating up with Wellbutrin 150 mg daily  This will also help with her nicotine use  For her anxiety, is using gabapentin but will also start clonidine 0 1 mg twice daily as needed anxiety  Advised to take as needed, monitor for low blood pressure    Could consider restarting SSRI, patient has been on Lexapro in the past and did fairly well, unsure of why medication was discontinued, but reports when taking in PHP in 2020 was sober for 8 months following that  Would need to monitor for any reactivation of kenji  Risks, benefits, and possible side effects of medications explained to patient and patient verbalizes understanding  Controlled Medication Discussion: Not applicable    Patient advised to call 911 if feeling suicidal or homicidal before acting out on their thoughts and they expressed understanding  [employer] Physician's Orders     Admit to: Partial Hospitalization, 5 x per week, for 10 days  Vital signs daily for three days and then as needed  Diet Regular  Group Psychotherapy 5 x per week  Wellness Group 5 x per week  Individual Therapy as needed  Family Therapy as needed  Diagnosis:   1  Bipolar I disorder, severe, current or most recent episode depressed, with mixed features (HCC)  cloNIDine (Catapres) 0 1 mg tablet    gabapentin (Neurontin) 300 mg capsule    buPROPion (Wellbutrin XL) 150 mg 24 hr tablet      2  Alcohol use disorder, severe, in early remission ([facility] )        3  Anxiety          This note was not shared with the patient due to this is a psychotherapy note      “I certify that the continuation of Partial Hospitalization services is medically necessary to improve and/or maintain the patient’s condition and functional level, and to prevent relapse or hospitalization, and that this could not be done at a less intensive level of care ”       Carlita 46 verbally agrees to participate in [employer]  Pt is aware that [employer] could be limited without vital signs or the ability to perform a full hands-on physical exam  Angelica Christine understands she or the provider may request at any time to terminate the video visit and request the patient to seek care or treatment in person

## 2023-05-09 NOTE — PSYCH
"Assessment/Plan:      Diagnoses and all orders for this visit:    Bipolar I disorder, severe, current or most recent episode depressed, with mixed features ([facility] [address] )    Alcohol use disorder, severe, in early remission [facility])    Anxiety          Subjective:     Patient ID: Leonard Quezada is a 47 y o  female  HPI:       Pre-morbid level of function and History of Present Illness:   As per Dr Angelica Orosco:   Patient is aware this is a billable service  HPI      Leonard Quezada is a 47 y o  female with past psychiatric history of bipolar disorder and anxiety is admitted to [facility] referred by [facility] inpatient unit      Fatemeh Medicine reports she is struggling with anxiety  She states she uses alcohol to cope, but has been sober since 5/1  She states she was hospitalized and discharged on Friday, but states they did not change her medications  She states she does not feel her medications are working well for her, as she is still feeling anxious  She states she would use alcohol to cope with the anxiety, but has not had a drink since she went to the ER on 5/1  States she will get racing thoughts and feel anxious, \"it's hard to explain\"  States she feels she sometimes \"makes stuff up\" to worry about, but states things are good at home  Denies any history of trauma  States in March her Wellbutrin was discontinued, which she states has made her feel worse  States she was put on Lurasidone, but hasn't been taking with food  States she does sleep well  She admits that she does have a history of bipolar disorder, and has had episodes of kenji in the past   She states that she had many mood swings when she was a teenager, but things began to get worse over the years  He feels that she has used alcohol to cope with her mental health over the years  She admits she is well connected to [facility] Caball[facility], and already attended on this morning  States she started drinking excessively after her children were born    States she was using " "alcohol to cope with anxiety and depression  Went to rehab when 28years old, and stayed sober for several months  She states her longest point of sobriety was 2 years, but she was never an everyday drinker  Denies any history of withdrawals or delirium tremens  States she feels her unstable mental health also really caused more drinking too  Started seeing [facility] in 2008 after birth of her son  She states that she has been on Depakote for many years, and has had other trials of medications as well  She states that she believes she was on Lexapro in the past, and we confirmed this when she was in her last partial hospitalization program here in 2020  She states that she was sober for 8 months after that  States her  is very supportive  She states that part of the reason she stayed sober through this past weekend was because of her family and their support  She denies thoughts to hurt himself or anyone else, denies any hallucinations  She states that she is [place] drinking, and she is hopeful that this program will provide her more coping skills  She states that it worked well for her in the past   Psychosocial Stressors include alcohol relapse recently         As per this writer: Andry Ford is a 47 y o  female using she/her pronouns [facility] [employer] [facility] due to history of bipolar disorder and anxiety  Osmar Banda reports that she started \"not thinking right and was not coping well\"   She reports struggling with anxiety and using alcohol to cope  She reports occasionally binge drinking   She shared that she is unable to tolerate alcohol anymore therefore she started drinking Vanilla Extra up to 6 bottles at a time  The longest Osmar Banda was sober for the past few decades was 2 years   She attended [facility] in 2020 and was sober for 8 months after that  Osmar Banda does not feel like her medication is working well because she still feels anxious   She reports not having a " "drink since 5/1/23  She shared that she does not know what the trigger is but she feels like \" every morning I feel like I will jump out of my skin  \" Sylvie Colmenares reports that everything is calm at home and there is no stress but she feels like she is \"climbing a wall and can't calm myself down  \" Sylvie Colmenares reports that she has racing and ruminating thoughts such as \" I have ruined everyone life  \" and \" I was never there for my kids  \" Sylvie Colmenares shared that sometimes she feels like she \"makes thing up \" to worry about  Krystal's symptoms include racing and ruminating thoughts, being fidgety, decrease interest, decrease in concentration , worrying, lack of energy - just wanting to stay in bed all day, decrease in hopelessness, and recent alcohol abuse  Denies SI, SIB, and HI  As per Ricrado Scales: \"climbing a wall and can't calm myself down  \"    Strengths identified by patient: \"determined to get better, good mom and good wife  \"    Reason for evaluation and partial hospitalization as an alternative to inpatient hospitalization PHP is medically necessary to prevent hospitalization as outpatient care has been unable to stabilize [place] Scales and a greater intensity of treatment is indicated  Milieu therapy to monitor for medication needs, provide wellness tools education and offer opportunity to share and connect to others  Group therapy, case management, psychiatric medication management, family contact and UR as indicated  ELOS 10 treatment days  Previous Psychiatric/psychological treatment/year:  several times in the past in [place] in 2020, [facility] in 2012  Most recently at [facility] twice in March and then May 2023 for 5 days on voluntary admissions  Attended PHP at [place] [address] in 2020       Current Psychiatrist/Therapist:   Medication Management:   Marcin De León [address]   [address] [facility], [address]  [phone]    Outpatient Therapy:   Has a therapist at [facility] - does not want to complete an " "JAKE  Requested a therapist at the [employer] location or virtual    Primary Care Physician:   Yecenia De La Fuente [facility]   [address] [facility] [address]  [place], [address]  [phone]      Outpatient and/or Partial and Other Community Resources Used (CTT, ICM, VNA): none      Problem Assessment:     SOCIAL/VOCATION:  Family Constellation (include parents, relationship with each and pertinent Psych/Medical History):     Family History   Problem Relation Age of Onset   • No Known Problems Mother    • Diabetes Father    • Hypertension Father    • No Known Problems Sister    • No Known Problems Sister    • No Known Problems Daughter    • No Known Problems Maternal Grandmother    • No Known Problems Paternal Grandmother    • No Known Problems Maternal Aunt    • No Known Problems Maternal Aunt    • No Known Problems Maternal Aunt    • No Known Problems Maternal Aunt    • No Known Problems Maternal Aunt    • No Known Problems Maternal Aunt    • No Known Problems Paternal Aunt        Mother: Supportive and has a cabin in [address]  Father: Supportive and has a cabin in [address]   Siblin sisters 5 47 and 43) - great relationship with both closest to younger sister Sara Alber is very rice\"   Spouse: Johnnie Taylor -  to for 16 years -  has been sober for 20 years   Children: 1 son(15) with Johnnie Taylor , her son (25) with her ex- she wants to have a closer relationship with, Daughter (32) -very close too, step-son (25) good relationship with  Other: Attends AA meeting every morning    Who is the person you relate to best is her 2 best friends, her mom, and litter sister  she lives with her  and 13year old son       Legal Guardian (for individuals under 18): NA  Family Factors impacting discharge planning (for individuals under 18): NA    Domestic Violence: No past history of domestic violence, There is not suspected domestic violence and There is no history of child " abuse    Trauma History: denies    Additional Comments related to family/relationships/peer support: NA      School or Work History (strengths/limitations/needs): started working part time 4 weeks ago as a       Her highest grade level achieved was high School diploma      history includes denies    Financial status includes stable    LEISURE ASSESSMENT (Include past and present hobbies/interests and level of involvement (Ex: Group/Club Affiliations): working out at [employer], [employer],reading, baking, cooking, boating, going outside, and her two [address] [place] [address] Way    Her primary language is [place]  Preferred language is [place]  Ethnic considerations are    Religions affiliations and level of involvement Congregation    FUNCTIONAL STATUS: There has been a recent change in the patient's ability to do the following: does not need van service    Level of Assistance Needed/By Whom?: MARKIE    Sitanaborashley Solo learns best by  reading, listening, demonstration, and picture    SUBSTANCE ABUSE ASSESSMENT: past substance abuse    Do you currently smoke? Yes     Offered smoking cessation? Yes     Substance/Route/Age/Amount/Frequency/Last Use:   Cigarette 1/2 pack per day just started smoking back up after quiting several years ago,   Alcohol sober for 10 days- would occasionally binge drink- recently was binge drinking about 4 bottles of vanilla extract  Marijuana NA   Other substance use - previously would drink vanilla extra - up to 6 bottles a day      Caffeine a few sups of coffee daily     DETOX HISTORY: NA    Previous detox/rehab treatment: 2008 at the [facility] - attends AA daily every morning    HEALTH ASSESSMENT: no nausea, no vomiting, no referral to PCP needed and She is not receiving prenatal care    Primary Care Physician:  Jeannie   [address] [facility] [address], [address]  [phone]    If None on file providers offered:No    Date of Last Physical: within the last year if not within the last year, one has been recommended:Yes - says about a year ago    NUTRITION SCREENING:  Do you have any food allergies: No   Weight loss or gain of 10 pounds or more in the last 3 months: No  Decrease in appetite and/or food intake: No  Dental issues impacting nutrition: No  Binging or restricting patterns: No  Past treatment for an eating disorder: No  Level of nutrition needs: Yes = 1 point; No = 0   0  none (0)- low (1-3) - moderate (4) - severe (5)   Action plan if moderate to severe: Referral to:N\A      LEGAL: No Mental Health Advance Directive or Power of  on file    Risk Assessment:   The following ratings are based on my interview(s) with Osmar Gruber    Risk of Harm to Self:   Demographic risk factors include   Historical Risk Factors include chronic psychiatric problems and substance abuse or dependence  Recent Specific Risk Factors include substance abuse and feelings of guilt or self blame    Risk of Harm to Others:   Demographic Risk Factors include NA  Historical Risk Factors include NA  Recent Specific Risk Factors include multiple stressors    Access to Weapons:   Osmar Banda has access to the following weapons: Denies  The following steps have been taken to ensure weapons are properly secured: NA    Based on the above information, the client presents the following risk of harm to self or others:  low    The following interventions are recommended:   no intervention changes    Notes regarding this Risk Assessment: provided crisis phone numbers for appropriate county and on call number as well as warm lines and peer support hotlines       Psychiatric Review Of Systems:     Appetite: no change  Adverse eating: no  Weight changes: no  Insomnia/sleeplessness: no  Fatigue/anergy: yes  Anhedonia/lack of interest: no  Attention/concentration: decreased, due to anxiety  Psychomotor agitation/retardation: no  Somatic symptoms: no  Anxiety/panic attack: worrying daily  Rachel/hypomania: past manic episodes, past mixed episodes, significant mood swings, lasting 7 or more days in a row  Hopelessness/helplessness/worthlessness: no  Self-injurious behavior/high-risk behavior: no  Suicidal ideation: no  Homicidal ideation: no  Auditory hallucinations: no  Visual hallucinations: no  Other perceptual disturbances: no  Delusional thinking: no  Obsessive/compulsive symptoms: no    Mental Status Evaluation:     Appearance age appropriate, casually dressed   Behavior cooperative, appears anxious, still at times guarded, good eye contact   Speech normal rate, normal volume, normal pitch   Mood dysphoric, anxious   Affect constricted   Thought Processes organized, goal directed   Associations intact associations   Thought Content no overt delusions   Perceptual Disturbances: no auditory hallucinations, no visual hallucinations   Abnormal Thoughts  Risk Potential Suicidal ideation - None  Homicidal ideation - None  Potential for aggression - No   Orientation oriented to person, place, time/date and situation   Memory recent and remote memory grossly intact   Consciousness alert and awake   Attention Span Concentration Span attention span and concentration are age appropriate   Intellect appears to be of average intelligence   Insight intact   Judgement intact   Muscle Strength and  Gait normal muscle strength and normal muscle tone, normal gait and normal balance   Motor Activity no abnormal movements   Language no difficulty naming common objects, no difficulty repeating a phrase, no difficulty writing a sentence   Fund of Knowledge adequate knowledge of current events  adequate fund of knowledge regarding past history  adequate fund of knowledge regarding vocabulary                      DSM:   1  Bipolar I disorder, severe, current or most recent episode depressed, with mixed features ([facility] [address] )        2   Alcohol use disorder, severe, in early remission ([facility] [address] )        3  Anxiety            Plan: admit to PHP  Group therapy, case management, medication management, UR and family contact as indicated    ELOS 10 treatment days    Anticipated aftercare plan:   Refer to OP psychiatry and therapy

## 2023-05-09 NOTE — PSYCH
Visit Time    Visit Start Time: 8038  Visit Stop Time: 1330  Total Visit Duration: 60 minutes    Subjective:     Patient ID: Taina Teresa is a 47 y o  female  [facility] Clinical Progress Notes      Specialized Services Documentation  Therapist must complete separate progress note for each specific clinical activity in which the individual participated during the day  Group Psychotherapy - Cognitive Distortions   This group was facilitated virtually in a private office using HIPAA compliant and approved [employer] teams  Taina Teresa consented to the use of tele-video modality of treatment  Taina Teresa attended group on cognitive distortions  Members reviewed and discussed the 16 types of cognitive distortions  As well as This writer facilitated a discussion on distortion triggers, the associated experience of emotions, and which distortions resonated with each member  Members evaluated their cognitive distortions and which thy use mostfrequently  Members were provided two worksheets to begin challenging their cognitive distortions outside of program  This writer encouraged members to actively seek out and challenge distorted thoughts sing these handouts  Taina Teresa made some efforts towards progress goals which were displayed through some participation in discussion and note taking  Tx Plan Objective: 1 1,1 2,1 4   ANA Lockett Co-Facilitate: Jose Gillis [facility]  Education Therapy   [id number] Taina Teresa did not attend due to intake  1200 Adriana Sevilla engaged throughout the treatment day  Was engaged in learning related to Illness, Medication, Aftercare, and Wellness Tools  Staff utilized Verbal, Written, A/V, and Demonstration teaching methods  Taina Teresa shared area of learning and set a goal for outside of program to attend an [address] meeting        Tx Plan Objective: 1 1,1 2,1 4  Therapist: Emily Lockett

## 2023-05-09 NOTE — PSYCH
Visit Time    Visit Start Time: 0930  Visit Stop Time: 5597  Total Visit Duration: 60 minutes    Subjective:     Patient ID: Jerre Hammans is a 47 y o  y o  female  [facility] Clinical Progress Notes      Specialized Services Documentation  Therapist must complete separate progress note for each specific clinical activity in which the individual participated during the day  This group was facilitated virtually in a private office using HIPAA Compliant and Approved [employer] Teams  Jerre Hammans consented to the use of tele-video modality of treatment  Psychotherapy Group  Jerre Hammans excused for evaluation    Tx Plan Objective: na Therapist:  Patricia EWING

## 2023-05-09 NOTE — PSYCH
Subjective:     Patient ID: Elijah May is a 47 y o  female  [facility] Clinical Progress Notes      Specialized Services Documentation  Therapist must complete separate progress note for each specific clinical activity in which the individual participated during the day  Other ([id number]) Spoke with Catherine Johansen then transferred with the clinical department from [facility] with a contact number [phone] , using Tax ID [id number]  [address], [address] location address of [address] [place], [place] with a billing address of [address] [place] [address]  Requested 15 days for  Elijah May  from 05/09/23 to 5/30/23 with a pending authorization code of [id number]  Clinical faxed at [phone]  Case Management Note    Emigdio Casas, MSW, LSW    Current suicide risk : Low       [id number]- Met with Elijah May via TEAMS  Reviewed program, initial paperwork reviewed: Consent for Treatment, PHP handbook, HIPPA, General Consent, Client Bill of Rights, and Smoking/Drug and Alcohol Policy  Release of Information obtained for emergency contact - Jessica Henry () [phone]  and PCP and Health Care Coordination Form  Elijah May has hard copies of all paperwork and verbally gave consent  Reviewed and given on call number  PCP notified of admission and health care coordination form sent  Completed initial psycho-social evaluation and initial treatment goals discussed  Shaneka Bowels physically unable to provide a signature; however, I understand the nature of and am in agreement with the documentation presented to me via TEAMS  I have received a copy through My Chart and/or the [address] Postal Service  I freely give verbal consent    Name of Document (s):Consent for Treatment, PHP handbook, HIPPA, General Consent, Client Bill of Rights, and Smoking/Drug and Alcohol Policy,Health Care Coordination Form, Release of Information   Witness to verbal consent: Emigdio Casas  Witness to verbal consent: Karine Hernandez      Medications changes/added/denied? Yes  - See Dr Marion Mesa Note    Treatment session number: Assessment and day 1    Individual Case Management Visit provided today?  yes    [facility] follow up physician's orders: [facility] - See Dr Marion Mesa Note

## 2023-05-10 ENCOUNTER — OFFICE VISIT (OUTPATIENT)
Dept: PSYCHOLOGY | Facility: CLINIC | Age: 53
End: 2023-05-10

## 2023-05-10 DIAGNOSIS — F31.4 BIPOLAR I DISORDER, SEVERE, CURRENT OR MOST RECENT EPISODE DEPRESSED, WITH MIXED FEATURES (HCC): Primary | ICD-10-CM

## 2023-05-10 DIAGNOSIS — F41.9 ANXIETY: ICD-10-CM

## 2023-05-10 DIAGNOSIS — F10.21 ALCOHOL USE DISORDER, SEVERE, IN EARLY REMISSION (HCC): ICD-10-CM

## 2023-05-10 NOTE — PSYCH
"  Subjective:     Patient ID: Foreign Saucedo is a 47 y o  female  [facility] Clinical Progress Notes      Specialized Services Documentation  Therapist must complete separate progress note for each specific clinical activity in which the individual participated during the day  Group Psychotherapy Life Skills ([id number]) Foreign Saucedo actively engaged in group focused on recognizing accomplishments which was facilitated virtually in a private office using HIPAA Compliant and Approved [employer] Teams  Jelena Garcia consented to the use of tele-video modality of treatment and was virtually present for group psychotherapy today  During group we discussed the accomplishments that they achieved today  We explored the reasons why it is so difficult to acknowledge those accomplishments  Group members watched a video, \"To achieve success, start detecting your small wins  \"  Reviewed the action steps discussed in the video  Encouraged group members to look at the accomplishments that they are proud of and the small steps they took to achieve their goal  Jelena Garcia stated that she accomplished doing laundry and dishes  Discussed creating smaller realistic achievable goals  We discussed why it is important to recognize the accomplishments no matter how big or small they seem  Jelena Garcia continues to make progress towards goals through verbal participation in group; to accomplish long term goals continue to utilize skills learned in programming  Continue with psychotherapy to educate and encourage use of wellness tools  Tx Plan Objective: 1 1,1 2 Therapist: Yara Smith ; co-facilitated with ANA Nicole    Case Management Note    CATHY Pittman    Current suicide risk : Low     1345- Case management attempted but Jelena Garcia was driving and unable to pull over  Jelena Garcia requested that case management be rescheduled for tomorrow at 8:45  Medications changes/added/denied?  No    Treatment session number: 2    Individual Case " Management Visit provided today?  Yes     [facility] follow up physician's orders: none at this time

## 2023-05-10 NOTE — PSYCH
Virtual Regular Visit    Verification of patient location:    Patient is located at Home in the following state in which I hold an active license [place]      Assessment/Plan:    Problem List Items Addressed This Visit        Other    Bipolar I disorder, severe, current or most recent episode depressed, with mixed features ([facility] ) - Primary    Alcohol use disorder, severe, in early remission ([facility] )    Anxiety       Goals addressed in session:           Reason for visit is PHP VIRTUAL GROUP DUE TO virtual preference of care      Encounter provider Campbell Sawyer    Provider located at [address] [place] [phone]      Recent Visits  Date Type Provider Dept   23 Office Visit [address] PARTIAL PSYCH GROUP THERAPY [facility]   Showing recent visits within past 7 days and meeting all other requirements  Future Appointments  No visits were found meeting these conditions  Showing future appointments within next 150 days and meeting all other requirements       The patient was identified by name and date of birth  Catrachita Calles was informed that this is a telemedicine visit and that the visit is being conducted [employer]  She agrees to proceed     My office door was closed  No one else was in the room  She acknowledged consent and understanding of privacy and security of the video platform  The patient has agreed to participate and understands they can discontinue the visit at any time  Patient is aware this is a billable service  Subjective  Catrachita Calles is a 47 y o  female         HPI     Past Medical History:   Diagnosis Date   • Alcohol abuse    • Anxiety    • Bipolar 1 disorder ([facility] )    • Depression    • Varicose veins of left lower extremity        Past Surgical History:   Procedure Laterality Date   •  SECTION     • VEIN LIGATION Left 2021 Procedure: multiple stab phlebectomies left lower extremity, no stripping and ligation no laser ablation;  Surgeon: Divine Berrios MD;  Location: [address] [place] [address] MAIN OR;  Service: Vascular       Current Outpatient Medications   Medication Sig Dispense Refill   • buPROPion (Wellbutrin XL) 150 mg 24 hr tablet Take 1 tablet (150 mg total) by mouth daily 7 tablet 0   • cloNIDine (Catapres) 0 1 mg tablet Take 1 tablet (0 1 mg total) by mouth 2 (two) times a day as needed (anxiety) 60 tablet 1   • divalproex sodium (DEPAKOTE ER) 500 mg 24 hr tablet Take 1,750 mg by mouth daily at bedtime     • gabapentin (Neurontin) 300 mg capsule Take 1 capsule (300 mg total) by mouth 3 (three) times a day 90 capsule 1   • ibuprofen (MOTRIN) 200 mg tablet Take by mouth every 6 (six) hours as needed for mild pain     • lurasidone (Latuda) 20 mg tablet Take 20 mg by mouth daily with breakfast     • Multiple Vitamin (multivitamin) capsule Take 1 capsule by mouth daily       No current facility-administered medications for this visit  No Known Allergies    Review of Systems    Video Exam    There were no vitals filed for this visit      Physical Exam     I spent FOUR GROUP HOURS PLUS CASE MANAGEMENT minutes with patient today in which greater than 50% of time was spent in counseling/coordination of care regarding PHP - SEE NOTES

## 2023-05-10 NOTE — PSYCH
Visit Time    Visit Start Time: 9808  Visit Stop Time: 0429  Total Visit Duration: 60 minutes    Subjective:     Patient ID: Klaus Galarza is a 47 y o  female  [facility] Clinical Progress Notes      Specialized Services Documentation  Therapist must complete separate progress note for each specific clinical activity in which the individual participated during the day  Group psychotherapy - Self Care   This group was facilitated virtually in a private office using HIPAA compliant and approved [employer] Teams  Klaus Galarza attended group on self-care  Members reviewed and discussed the importance of self-care, the components of self-care, and the domains of self-care  This writer facilitated a discussion on the types of self-care activities members currently use, the barriers to self care, and which ones they would like to use in the future  Members evaluated their self-care activities and which they use most frequently through a self care assessment  This  then provided members with a self care menu in which each patient could develop a comprehensive structured outline of their self care activities  Members were then encouraged to share the activities they would like to improve or integrate in the future  This writer encouraged members to use the materials outside of programming  Klaus Galarza made good efforts towards progress goals which were displayed through participation, notetaking, and engagement in topic  Ashish Lui shared that self care is important as it helps her fill a void with positive things rather than resorting to her substance abuse  Tx Plan Objective:1 1,1 2,1 4   Therapist: ANA Crmaer Co-Facilitator: RATNA Ernst    Education Therapy   [id number] Klaus Galarza actively shared in morning assessment and goal review  Presented as Receptive related to readiness to learn  Klaus Galarza did not complete goal from last treatment day identifying wanting to gain support  did not present with any barriers to learning  1200 Adriana Sevilla engaged throughout the treatment day  Was engaged in learning related to Illness, Medication, Aftercare, and Wellness Tools  Staff utilized Verbal, Written, A/V, and Demonstration teaching methods  Gracelaura Damien shared area of learning and set a goal for outside of program to attend her sons baseball game        Tx Plan Objective: 1 1,1 2,1 4,  Therapist: Jayesh Enrique

## 2023-05-10 NOTE — PSYCH
Subjective:    Patient ID: Taina Teresa is a 47 y o  female      [facility] Clinical Progress Notes      Specialized Services Documentation  Therapist must complete separate progress note for each specific clinical activity in which the individual participated during the day  Group Psychotherapy  Taina Teresa consented to the use of tele-video modality of treatment  [id number] This group was facilitated virtually in a private office using HIPAA Compliant and Approved Microsoft Teams  Taina Teresa participated actively in a psychotherapy group focused on identifying hope and practicing vulnerability skills  • A list of 28 questions which led participants to identify personal connections to group focus areas  After time to choose and answer at least four  independently, the group took turns sharing the questions they chose and their answers    o Some examples of questions: What's something new you've learned about yourself in the last three months? What is something you are hopeful about for the future? What's something you've drawn inspiration from recently? What really made you laugh recently? • A list of 11 new questions, which were a more in-depth expansion of the same themes, was presented and with instruction to respond to all of them with the provided independent writing time  Following which volunteers shared their answers with the group    o Some examples of questions: What quality of your own do you love in others? What’s one thing you wish people knew about you? What are some things you are grateful about today? What’s one thing you’ve changed your mind about lately? What do other people think you’re good at? Taina Teresa shared that she is grateful today for her life and the opportunity to get her life back on track  Continue to note progress towards goals  Continue with psychotherapy to further encourage vulnerability and hope     Tx Plan Objective 1 1, 1 2, 1 4 Therapist: ANA Smith

## 2023-05-11 ENCOUNTER — OFFICE VISIT (OUTPATIENT)
Dept: PSYCHOLOGY | Facility: CLINIC | Age: 53
End: 2023-05-11

## 2023-05-11 DIAGNOSIS — F31.4 BIPOLAR I DISORDER, SEVERE, CURRENT OR MOST RECENT EPISODE DEPRESSED, WITH MIXED FEATURES (HCC): Primary | ICD-10-CM

## 2023-05-11 DIAGNOSIS — F41.9 ANXIETY: ICD-10-CM

## 2023-05-11 DIAGNOSIS — F10.21 ALCOHOL USE DISORDER, SEVERE, IN EARLY REMISSION (HCC): ICD-10-CM

## 2023-05-11 NOTE — PSYCH
Virtual Regular Visit    Verification of patient location:    Patient is located at Home in the following state in which I hold an active license [place]      Assessment/Plan:    Problem List Items Addressed This Visit        Other    Bipolar I disorder, severe, current or most recent episode depressed, with mixed features ([facility] [address] ) - Primary    Alcohol use disorder, severe, in early remission ([facility] [address] )    Anxiety       Goals addressed in session:           Reason for visit is PHP VIRTUAL GROUP DUE TO virtual preference of care      Encounter provider Campbell Sawyer    Provider located at [address] [place] [phone]      Recent Visits  Date Type Provider Dept   05/10/23 Office Visit [address] PARTIAL PSYCH GROUP THERAPY [facility]   05/09/23 Office Visit [address] PARTIAL PSYCH GROUP THERAPY [facility]   Showing recent visits within past 7 days and meeting all other requirements  Future Appointments  No visits were found meeting these conditions  Showing future appointments within next 150 days and meeting all other requirements       The patient was identified by name and date of birth  Lucile Dandy was informed that this is a telemedicine visit and that the visit is being conducted [employer]  She agrees to proceed     My office door was closed  No one else was in the room  She acknowledged consent and understanding of privacy and security of the video platform  The patient has agreed to participate and understands they can discontinue the visit at any time  Patient is aware this is a billable service  Subjective  Lucile Dandy is a 47 y o  female         HPI     Past Medical History:   Diagnosis Date   • Alcohol abuse    • Anxiety    • Bipolar 1 disorder ([facility] [address] )    • Depression    • Varicose veins of left lower extremity        Past Surgical History:   Procedure Laterality Date   •  SECTION     • VEIN LIGATION Left 2021    Procedure: multiple stab phlebectomies left lower extremity, no stripping and ligation no laser ablation;  Surgeon: Fei Gonzalez MD;  Location: [facility] MAIN OR;  Service: Vascular       Current Outpatient Medications   Medication Sig Dispense Refill   • buPROPion (Wellbutrin XL) 150 mg 24 hr tablet Take 1 tablet (150 mg total) by mouth daily 7 tablet 0   • cloNIDine (Catapres) 0 1 mg tablet Take 1 tablet (0 1 mg total) by mouth 2 (two) times a day as needed (anxiety) 60 tablet 1   • divalproex sodium (DEPAKOTE ER) 500 mg 24 hr tablet Take 1,750 mg by mouth daily at bedtime     • gabapentin (Neurontin) 300 mg capsule Take 1 capsule (300 mg total) by mouth 3 (three) times a day 90 capsule 1   • ibuprofen (MOTRIN) 200 mg tablet Take by mouth every 6 (six) hours as needed for mild pain     • lurasidone (Latuda) 20 mg tablet Take 20 mg by mouth daily with breakfast     • Multiple Vitamin (multivitamin) capsule Take 1 capsule by mouth daily       No current facility-administered medications for this visit  No Known Allergies    Review of Systems    Video Exam    There were no vitals filed for this visit      Physical Exam     I spent FOUR GROUP HOURS PLUS CASE MANAGEMENT minutes with patient today in which greater than 50% of time was spent in counseling/coordination of care regarding PHP - SEE NOTES

## 2023-05-11 NOTE — PSYCH
Subjective:     Patient ID: Yuliya Lombardo is a 47 y o  female  [facility] Clinical Progress Notes      Specialized Services Documentation  Therapist must complete separate progress note for each specific clinical activity in which the individual participated during the day  Allied Therapy   Group was facilitated virtually in a private office using HIPAA compliant and approved [employer] Teams  Yuliya Lombardo  consented to the use of tele-video modality of treatment and was virtually present for group allied therapy  [id number] Yuliya Lombardo  shared in [facility] group focused on leisure skills  The group identified current leisure skills as well as learned how to determine between healthy and unhealthy leisure skills  The group engaged in a song discussion as well as a fill in the blank song writing exercise  Harrison Thompson discussed healthy vs unhealthy leisure and will practice spending time at the pool and cook as leisure skills  Some progress was made toward treatment goal  Continue AT to encourage development and practice of leisure skills     Tx Plan Objective: 1 1,1 2,1 4, Therapist:  Julius Reynoso, KAYLIN

## 2023-05-11 NOTE — PSYCH
Subjective:    Patient ID: Ramírez Meadows is a 47 y o  female      [facility] Clinical Progress Notes      Specialized Services Documentation  Therapist must complete separate progress note for each specific clinical activity in which the individual participated during the day  Group Psychotherapy  Ramírez Meadows consented to the use of tele-video modality of treatment  [id number] This group was facilitated virtually in a private office using HIPAA Compliant and Approved [employer] Teams  Ramírez Meadows participated actively in a psychotherapy group focused on control  The group began by covering control fallacies, particularly the spectrum between hyper-control and out-of-control  Facilitator introduced the concept of control through the lens of the circles of: concern, influence, and control  Explanations and examples were provided for each [place] and shown via screen-sharing  Participants were shown a worksheet with a blank diagram that contained the three overlapping circles to use as a guide to fill-in with their own examples of things that are of concern (out of their control), things that are under their influence (under their indirect control), and things under their direct control  Reflection questions were provided after the worksheet and participants were guided to work on these at their own pace, following the circles of control portion  Discussion was encouraged and prompted throughout the group, and members were asked to share any questions as they arose  Ramírez Meadows appeared on camera actively engaged but did not verbally engage  Continue to note progress towards goals  Continue with psychotherapy to strengthen awareness of control     Tx Plan Objective 1 1, 1 2, 1 4 Therapist: ANA [employer]-Facilitator Citlaly Macdonald [place]

## 2023-05-11 NOTE — PSYCH
Visit Time    Visit Start Time: 1269  Visit Stop Time: 1330  Total Visit Duration: 60 minutes    Subjective:     Patient ID: Taina Teresa is a 47 y o  female  [facility] Clinical Progress Notes      Specialized Services Documentation  Therapist must complete separate progress note for each specific clinical activity in which the individual participated during the day  Group Psychotherapy - Boundaries    This group was facilitated virtually in a private office using HIPAA compliant and approved Microsoft Teams  Taina Teresa consented to the use of tele-video modality of treatment  This group was facilitated face to face in a private office setting using HIPAA compliant protocols  Taina Teresa attended group on Boundaries  Today group members focused on Boundaries and identified ways they struggle with setting healthy boundaries  The goal of today's group was for members to evaluate their healthy and unhealthy boundaries and identify techniques they could use to practice setting boundaries  This writer facilitated group discussion on these questions relating to boundaries:    What  types of boundaries did you set or would like to set? What challenges can you encounter when setting boundaries? What are the positive and negative outcomes of setting boundaries? How do ou react to a boundary violator? Members participated in group through involvement in group discussion and notetaking on topics such as:   7 types of boundaries     Importance of boundaries  [place] violations    Members evaluated their boundaries and acknowledged their struggle to set appropriate boundaries  This writer encouraged members to participate and integrate coping and reviewed techniques when setting boundaries  Taina Teresa made good efforts towards progress goals which were displayed through participation, notetaking, and engagement in topic    Leticia shared that she  Really struggles to set boundaries whit her parents when they stay with her for months  Tx Plan Objective: 1 1,1 2,1 4   Therapist: [facility]N [facility]     Education Therapy   [id number] Trinh Diaz actively shared in morning assessment and goal review  Presented as Receptive related to readiness to learn  Trinh Diaz did complete goal from last treatment day identifying gaining support  did not present with any barriers to learning  1200 Adriana Sevilla engaged throughout the treatment day  Was engaged in learning related to Illness, Medication, Aftercare, and Wellness Tools  Staff utilized Verbal, Written, A/V, and Demonstration teaching methods  Trinh Diaz shared area of learning and set a goal for outside of program to go to her sons chorus concert        Tx Plan Objective: 1 1,1 2,1 4, Therapist: MARILY[facility]

## 2023-05-11 NOTE — PSYCH
Subjective:     Patient ID: Leonard Quezada is a 47 y o  female  [facility] Clinical Progress Notes      Specialized Services Documentation  Therapist must complete separate progress note for each specific clinical activity in which the individual participated during the day  Case Management Note    CATHY Brand    Current suicide risk : Low     [id number]- Ivonne Sessions reported that groups have been so helpful  Reviewed tx plan  Ivonne Sessions shared that she is scheduled to work next Thursday and Friday and would like to step down to IOP at that time  Informed of CM schedule and med check appt  Medications changes/added/denied? No    Treatment session number: 3    Individual Case Management Visit provided today?  Yes     [facility] follow up physician's orders: none at this time

## 2023-05-12 ENCOUNTER — OFFICE VISIT (OUTPATIENT)
Dept: PSYCHOLOGY | Facility: CLINIC | Age: 53
End: 2023-05-12

## 2023-05-12 DIAGNOSIS — F31.4 BIPOLAR I DISORDER, SEVERE, CURRENT OR MOST RECENT EPISODE DEPRESSED, WITH MIXED FEATURES (HCC): Primary | ICD-10-CM

## 2023-05-12 DIAGNOSIS — F41.9 ANXIETY: ICD-10-CM

## 2023-05-12 DIAGNOSIS — F10.21 ALCOHOL USE DISORDER, SEVERE, IN EARLY REMISSION (HCC): ICD-10-CM

## 2023-05-12 NOTE — PSYCH
"    Subjective:     Patient ID: Verna Gill is a 47 y o  female  [facility] Clinical Progress Notes      Specialized Services Documentation  Therapist must complete separate progress note for each specific clinical activity in which the individual participated during the day  Group Psychotherapy  This group was facilitated virtually in a private office using HIPAA Compliant and Approved [employer] Teams  Verna Gill  consented to the use of tele-video modality of treatment  ([id number]) Verna Gill quietly participated in psychoeducational group which focused on nutrition to improve physical and mental wellbeing  Topics included:  • How our diet affects our mental health  • Mental health benefits of healthy eating  • Barriers to eating healthy  • Ways to overcome barriers  • Macro and Micro nutrients   • Appropriate serving sizes for all food groups as well as benefits to eating for health       The group then viewed a [employer] talk video titled \"The Brain Intervention at the End of our [place]\" by Dr Marley Persaud  They then discussed ideas on how to improve on their nutrition  Some progress toward goal noted  Continue psychoeducation to educate on the importance of making nutrition a priority    Tx Plan Objectives: 1 1,1 2    Therapist: Orlando LINTON  Co led Adolfo OMRGAN   "

## 2023-05-12 NOTE — PSYCH
Virtual Regular Visit    Verification of patient location:    Patient is located at Home in the following state in which I hold an active license [place]      Assessment/Plan:    Problem List Items Addressed This Visit        Other    Bipolar I disorder, severe, current or most recent episode depressed, with mixed features ([facility] [address] ) - Primary    Alcohol use disorder, severe, in early remission ([facility]    Anxiety       Goals addressed in session:           Reason for visit is PHP VIRTUAL GROUP DUE TO virtual preference of care      Encounter provider Campbell Sawyer    Provider located at [address] [place] [address]  [address] [facility] [phone]      Recent Visits  Date Type Provider Dept   05/11/23 Office Visit [address] PARTIAL PSYCH GROUP THERAPY [facility]   05/10/23 Office Visit [address] PARTIAL PSYCH GROUP THERAPY [facility]   05/09/23 Office Visit [address] PARTIAL PSYCH GROUP THERAPY [facility]   Showing recent visits within past 7 days and meeting all other requirements  Future Appointments  No visits were found meeting these conditions  Showing future appointments within next 150 days and meeting all other requirements       The patient was identified by name and date of birth  Ainsley Dyer was informed that this is a telemedicine visit and that the visit is being conducted [employer]  She agrees to proceed     My office door was closed  No one else was in the room  She acknowledged consent and understanding of privacy and security of the video platform  The patient has agreed to participate and understands they can discontinue the visit at any time  Patient is aware this is a billable service  Subjective  Ainsley Dyer is a 47 y o  female         HPI     Past Medical History:   Diagnosis Date   • Alcohol abuse    • Anxiety    • Bipolar 1 disorder ([facility] )    • Depression    • Varicose veins of left lower extremity        Past Surgical History:   Procedure Laterality Date   •  SECTION     • VEIN LIGATION Left 2021    Procedure: multiple stab phlebectomies left lower extremity, no stripping and ligation no laser ablation;  Surgeon: Cassi Rodriguez MD;  Location: [facility] MAIN OR;  Service: Vascular       Current Outpatient Medications   Medication Sig Dispense Refill   • buPROPion (Wellbutrin XL) 150 mg 24 hr tablet Take 1 tablet (150 mg total) by mouth daily 7 tablet 0   • cloNIDine (Catapres) 0 1 mg tablet Take 1 tablet (0 1 mg total) by mouth 2 (two) times a day as needed (anxiety) 60 tablet 1   • divalproex sodium (DEPAKOTE ER) 500 mg 24 hr tablet Take 1,750 mg by mouth daily at bedtime     • gabapentin (Neurontin) 300 mg capsule Take 1 capsule (300 mg total) by mouth 3 (three) times a day 90 capsule 1   • ibuprofen (MOTRIN) 200 mg tablet Take by mouth every 6 (six) hours as needed for mild pain     • lurasidone (Latuda) 20 mg tablet Take 20 mg by mouth daily with breakfast     • Multiple Vitamin (multivitamin) capsule Take 1 capsule by mouth daily       No current facility-administered medications for this visit  No Known Allergies    Review of Systems    Video Exam    There were no vitals filed for this visit      Physical Exam     I spent FOUR GROUP HOURS PLUS CASE MANAGEMENT minutes with patient today in which greater than 50% of time was spent in counseling/coordination of care regarding PHP - SEE NOTES

## 2023-05-12 NOTE — PSYCH
Visit Time  Visit Start Time: 0930  Visit Stop Time: 0308  Total Visit Duration: 60 minutes  Subjective:     Patient ID: Radha Waterman is a 47 y o  female  [facility] Clinical Progress Notes      Specialized Services Documentation  Therapist must complete separate progress note for each specific clinical activity in which the individual participated during the day  Group Psychotherapy - Coping Skills  This group was facilitated virtually in a private office using HIPAA compliant and approved [employer] teams  Radha Waterman consented to the use of tele-video modality of treatment  Radha Waterman attended group on coping skills  Members reviewed and discussed coping styles and techniques  As well as healthy versus unhealthy coping mechanisms  This writer facilitated a discussion on various coping mechanisms  Members evaluated their coping skills usage and reflected on which unhealthy and healthy methods they depend on  Members were provided a coping skills inventory worksheet to document their coping skills usage  This writer encouraged members to participate and integrate techniques when using coping skills  Radha Waterman made good efforts towards progress goals which were displayed through participation, notetaking, and engagement in topic  Madhuri Rosales realized that she uses many unhealthy coping skills  Tx Plan Objective:1 1,1 2,1 4   Therapist: ANA Mir  Co-facilitated: Shae Hardy RN    Visit Time  Visit Start Time: 4737  Visit Stop Time: 1330  Total Visit Duration: 60 minutes  Subjective:     Patient ID: Radha Waterman is a 47 y o  female  [facility] Clinical Progress Notes      Specialized Services Documentation  Therapist must complete separate progress note for each specific clinical activity in which the individual participated during the day      Group Psychotherapy - Support Systems  This group was facilitated virtually in a private office using HIPAA compliant and approved [employer] teams  Taina Teresa consented to the use of tele-video modality of treatment  Taina Teresa attended group on support systems  Today group members focused on examining their personal support networks  Members were provided a handout where they could outline/identify their supports in different roles and rate how helpful they are  They also identified areas they needed more support  The group processed the hand out together brainstormed ideas on how to develop supports, get support, and be a support  We also discussed the importance of asking for help  To end group, each member identified one support they were going to develop over the week  Taina Teresa made some efforts towards progress goals which were displayed through some participation and engagement  Antone Harada was observed to be cutting vegetables for an extended period during group  Tx Plan Objective:1 1,1 2,1 4  Therapist: ANA Lockett   Co-Facilitated: HILARIA Castillo    Education Therapy     [id number] Taina Teresa engaged throughout the treatment day  Was engaged in learning related to Illness, Medication, Aftercare, and Wellness Tools  Staff utilized Verbal, Written, A/V, and Demonstration teaching methods  Taina Teresa shared area of learning and set a goal for outside of program to attend her [place] [address] meeting        Tx Plan Objective: 1 1,1 2,1 4, Therapist: Jayesh Lockett

## 2023-05-12 NOTE — PSYCH
Subjective:     Patient ID: Trinh Diaz is a 47 y o  female  [facility] Clinical Progress Notes      Specialized Services Documentation  Therapist must complete separate progress note for each specific clinical activity in which the individual participated during the day  Education Therapy   [id number] Trinh Diaz actively shared in morning assessment and goal review  Presented as Receptive related to readiness to learn  Trinh Diaz did complete goal from last treatment day identifying gaining responsibility, hope and support  did not present with any barriers to learning  Tx Plan Objective: 1 1,1 2,1 4 Therapist:  CATHY Patel, LSW    Other ([id number]) Spoke with Eric Espinal from [facility] with a contact number [phone] ext  [phone] , using Tax ID [id number]  [address] [place] [address],  [address] location address of [address] [place] [address] with a billing address of [address] [place] [address]  Trinh Diaz was Authorized for 10 days from 23 to 23 with authorization code of [id number]  Clinical information for concurrent review can be faxed to [phone]-OZ forms need to be completed or we can call [employer] with a contact number [phone] ext  [phone] and she will transfer to a clinician since she is not the reviewer  Discharge clinical can be faxed to [phone]  Information that needs to be included on fax cover sheet is Name, , [employer] insurance ID, NPI numbers and Tax ID number            Case Management Note    CATHY Patel    Current suicide risk : Yudelka Saenz 89 emailed back and forth several times about her needing to speak to her boss and needing to possibly leave early, paperwork required for the program, med check appointment and authorization days  Medications changes/added/denied? No    Treatment session number: 4    Individual Case Management Visit provided today?  Yes     [facility] follow up physician's orders: none at this time

## 2023-05-15 ENCOUNTER — DOCUMENTATION (OUTPATIENT)
Dept: PSYCHOLOGY | Facility: CLINIC | Age: 53
End: 2023-05-15

## 2023-05-15 ENCOUNTER — APPOINTMENT (OUTPATIENT)
Dept: PSYCHOLOGY | Facility: CLINIC | Age: 53
End: 2023-05-15
Payer: COMMERCIAL

## 2023-05-15 NOTE — PROGRESS NOTES
Subjective:     Patient ID: Milena Hensley is a 47 y o  female  [facility] Clinical Progress Notes      Specialized Services Documentation  Therapist must complete separate progress note for each specific clinical activity in which the individual participated during the day  Case Management Note    CATHY Sim    Current suicide risk : Unable to assess due to absence  Milena Hensley was excused from  program today 05/15/23 due to needing to go into work  Rescheduled Case management  Harden Ratel reports she will be at her med check Tuesday  Medications changes/added/denied? No    Treatment session number: N/A    Individual Case Management Visit provided today? No    [facility] follow up physician's orders: None at this time

## 2023-05-16 ENCOUNTER — TELEMEDICINE (OUTPATIENT)
Dept: PSYCHIATRY | Facility: CLINIC | Age: 53
End: 2023-05-16

## 2023-05-16 ENCOUNTER — OFFICE VISIT (OUTPATIENT)
Dept: PSYCHOLOGY | Facility: CLINIC | Age: 53
End: 2023-05-16

## 2023-05-16 DIAGNOSIS — F41.9 ANXIETY: ICD-10-CM

## 2023-05-16 DIAGNOSIS — F31.4 BIPOLAR I DISORDER, SEVERE, CURRENT OR MOST RECENT EPISODE DEPRESSED, WITH MIXED FEATURES (HCC): Primary | ICD-10-CM

## 2023-05-16 DIAGNOSIS — F10.21 ALCOHOL USE DISORDER, SEVERE, IN EARLY REMISSION (HCC): ICD-10-CM

## 2023-05-16 RX ORDER — BUPROPION HYDROCHLORIDE 150 MG/1
300 TABLET ORAL DAILY
Qty: 7 TABLET | Refills: 0 | Status: SHIPPED | OUTPATIENT
Start: 2023-05-16 | End: 2023-05-16

## 2023-05-16 RX ORDER — BUPROPION HYDROCHLORIDE 300 MG/1
300 TABLET ORAL DAILY
Start: 2023-05-16

## 2023-05-16 NOTE — PSYCH
Subjective:     Patient ID: Deep Guajardo is a 47 y o  female  [facility] Clinical Progress Notes      Specialized Services Documentation  Therapist must complete separate progress note for each specific clinical activity in which the individual participated during the day  Group Psychotherapy  Life Skills ([id number]) Deep Guajardo actively engaged in group focused on ruptures and repairs in relationships which was facilitated virtually in a private office using HIPAA Compliant and Approved Microsoft Teams  Janet Payne consented to the use of tele-video modality of treatment and was virtually present for group psychotherapy today  Group members watched a video, The secret of successful relationships: rupture and repair  We discussed about ruptures and repairs in relationships and how do we know the relationship is worth repairing  Group members shared about relationships they want(ed) to repair and relationships that were ruptured that weren't worth repairing  Janet Payne stated that they want to repair the relationship with her partner  During group we discussed why or why not improving the relationship is important  Group members were asked to think about how the other person might view the relationship  The group members discussed what strengths, values and good that person has brought into their life  The group discussed ways to repair relationships  Janet Payne continues to make progress towards goals through verbal participation in group; to accomplish long term goals continue to utilize skills learned in programming  Continue with psychotherapy to educate and encourage use of wellness tools  Tx Plan Objective: 1 1,1 2 Therapist: Idell Goldmann    Case Management Note    CATHY Becker    Current suicide risk : Low     [id number]- Janet Payne reports that she was a little anxious at work but she is glad that she went  Janet Payne wants to work on her distorted thinking and paranoia    Paul Edmondson believes that people are talking about her even when she knows for a fact that they are not  Discussed thought records  Kelly Lynch reports that she can feel a difference since her medication change  Informed of case management schedule  Medications changes/added/denied? No - See [place] [place] note    Treatment session number: 5    Individual Case Management Visit provided today?  Yes     [facility] follow up physician's orders: none at this time - See [place] note

## 2023-05-16 NOTE — PSYCH
Virtual Regular Visit    Verification of patient location:    Patient is located at Home in the following state in which I hold an active license [place]      Assessment/Plan:    Problem List Items Addressed This Visit        Other    Bipolar I disorder, severe, current or most recent episode depressed, with mixed features ([facility] [address] ) - Primary    Alcohol use disorder, severe, in early remission ([facility] )    Anxiety       Goals addressed in session:           Reason for visit is PHP VIRTUAL GROUP DUE TO VIRTUAL PREFERENCE      Encounter provider [facility] PARTIAL [address]    Provider located at [address] [place] [address]  [address] [facility] [id number]      Recent Visits  Date Type Provider Dept   05/12/23 Office Visit [facility] PARTIAL PSYCH GROUP THERAPY [facility]   05/11/23 Office Visit [facility] PART[facility] PSYCH GROUP THERAPY [facility]   05/10/23 Office Visit [facility] PART[facility] PSYCH GROUP THERAPY [facility]   05/09/23 Office Visit [facility] PART[facility] PSYCH GROUP THERAPY [facility]   Showing recent visits within past 7 days and meeting all other requirements  Future Appointments  No visits were found meeting these conditions  Showing future appointments within next 150 days and meeting all other requirements       The patient was identified by name and date of birth  Alan Gold was informed that this is a telemedicine visit and that the visit is being conducted [employer]  She agrees to proceed     My office door was closed  No one else was in the room  She acknowledged consent and understanding of privacy and security of the video platform  The patient has agreed to participate and understands they can discontinue the visit at any time  Patient is aware this is a billable service  Subjective  Alan Gold is a 47 y o  female         HPI     Past Medical History:   Diagnosis Date   • Alcohol abuse    • Anxiety    • Bipolar 1 disorder (HCC)    • Depression    • Varicose veins of left lower extremity        Past Surgical History:   Procedure Laterality Date   •  SECTION     • VEIN LIGATION Left 2021    Procedure: multiple stab phlebectomies left lower extremity, no stripping and ligation no laser ablation;  Surgeon: Charli Rahman MD;  Location: [address] [place] [address] OR;  Service: Vascular       Current Outpatient Medications   Medication Sig Dispense Refill   • buPROPion (Wellbutrin XL) 300 mg 24 hr tablet Take 1 tablet (300 mg total) by mouth daily     • cloNIDine (Catapres) 0 1 mg tablet Take 1 tablet (0 1 mg total) by mouth 2 (two) times a day as needed (anxiety) 60 tablet 1   • divalproex sodium (DEPAKOTE ER) 500 mg 24 hr tablet Take 1,750 mg by mouth daily at bedtime     • gabapentin (Neurontin) 300 mg capsule Take 1 capsule (300 mg total) by mouth 3 (three) times a day 90 capsule 1   • ibuprofen (MOTRIN) 200 mg tablet Take by mouth every 6 (six) hours as needed for mild pain     • lurasidone (Latuda) 20 mg tablet Take 20 mg by mouth daily with breakfast     • Multiple Vitamin (multivitamin) capsule Take 1 capsule by mouth daily       No current facility-administered medications for this visit  No Known Allergies    Review of Systems    Video Exam    There were no vitals filed for this visit  Physical Exam     I spent FOUR GROUP HOURS PLUS CASE MANAGEMENT minutes with patient today in which greater than 50% of the time was spent in counseling/coordination of care regarding PHP - SEE NOTES

## 2023-05-16 NOTE — PSYCH
Visit Time    Visit Start Time: 4714  Visit Stop Time: 2931  Total Visit Duration: 60 minutes    Subjective:     Patient ID: Stacey Lee is a 47 y o  female  [facility] Clinical Progress Notes       Group Psychotherapy - Improve Acronym   This group was facilitated virtually in a private office using HIPAA compliant and approved Microsoft Teams  Stacey Lee consented to the use of tele-video modality of treatment  Stacey Lee attended group on the Improve distress tolerance skill  The group facilitator engaged the members in a discussion on distress tolerance and the use of the improve acronym, self-soothing techniques, and mindfulness to manage distress  Members explored the improve acronym and developed a distress response plan using the acronym  The group facilitator encouraged each member to use real life situations in order to effectively apply this plan to real life distress  This  then facilitated a discussion on distress and each member's action plans  Members were encouraged to utilize all supplemental materials outside of programming  Stacey Lee made some efforts towards progress goals which were displayed through note taking and engagement in topic  Tx Plan Objective: 1 1,1 2,1 4  Therapist: MARILYN Ledesma [facility]    Education Therapy   [id number] Stacey Lee actively shared in morning assessment and goal review  Presented as Receptive related to readiness to learn  Stacey Lee did complete goal from last treatment day identifying gaining support  did not present with any barriers to learning  1200 Adriana Sevilla engaged throughout the treatment day  Was engaged in learning related to Illness, Medication, Aftercare, and Wellness Tools  Staff utilized Verbal, Written, A/V, and Demonstration teaching methods  Stacey Lee shared area of learning and set a goal for outside of program to spend time with her son        Tx Plan Objective: 1 1,1 2,1 4, Therapist: Jose Beck Brooks Diaz, [place]

## 2023-05-16 NOTE — PSYCH
Subjective:     Patient ID: Foreign Saucedo is a 47 y o  female  [facility] Clinical Progress Notes      Specialized Services Documentation  Therapist must complete separate progress note for each specific clinical activity in which the individual participated during the day  Group Psychotherapy  This group was facilitated virtually in a private office using HIPAA Compliant and Approved [employer] Teams  Foreign Saucedo consented to the use of tele-video modality of treatment  ([id number])  Foreign Saucedo actively shared in psychoeducational group which focused on Tapping skills- Emotional Freedom Technique (EFT) as a coping skill  How Tapping can help, where Tapping comes from, why Tapping works and how to [address] were discussed with the group  The group discussed benefits of Tapping  The group was then guided through a Tapping scenario  The group wrapped up by making a goal to be more positive  Good progress towards goal noted  Continue psychoeducation to educate on the use of Tapping as a healthy coping skill to improve mental wellbeing    Tx  Plan Objectives: [de-identified]   Therapist: Ainsley LINTON RN

## 2023-05-16 NOTE — PSYCH
Virtual Regular Visit    Verification of patient location:    Patient is located at Home in the following state in which I hold an active license [place]     This note was not shared with the patient due to reasonable likelihood of causing patient harm      Assessment/Plan:    Problem List Items Addressed This Visit        Other    Bipolar I disorder, severe, current or most recent episode depressed, with mixed features [facility]) - Primary    Anxiety         Encounter provider SARAH Crane    Provider located at  [address] [place] [id number] [phone]      Recent Visits  Date Type Provider Dept   05/09/23 Panda Devine [address], DO [address]  recent visits within past 7 days and meeting all other requirements  Future Appointments  No visits were found meeting these conditions  Showing future appointments within next 150 days and meeting all other requirements       The patient was identified by name and date of birth  Alan Gold was informed that this is a telemedicine visit and that the visit is being conducted [employer]  She agrees to proceed     My office door was closed  No one else was in the room  She acknowledged consent and understanding of privacy and security of the video platform  The patient has agreed to participate and understands they can discontinue the visit at any time  Patient is aware this is a billable service  Progress Note - Behavioral Health   Marylynn Brunner Yasmani 47 y o  female MRN: [id number]     Progress at partial program: improving  Dorcas Etienne seen today, for a medication check    Dorcas Etienne was last seen by Dr Brady Juares on 5/9/23 when gabapentin was increased to 300mg TID, clonidine was initiated 0 1mg PO BID PRN for anxiety and Wellbutrin XL was restarted at 150mg PO daily and she was advised to increase to "300mg PO daily in one week  Hilary Crocker states she is feeling \"much better\" since restarting the Wellbutrin and increasing the gabapentin  She feels her anxiety is better controlled  She had a nice weekend spending time with children on mothers day and she worked outside in her yard  She only needed to take clonidine one time for anxiety, she stated it made her a bit tired but helped with anxiety  She denied any lightheadedness or dizziness  She is she is getting a lot from the program explaining that she understands some distorted thinking she has had in the past   She reports taking all medications as prescribed and no side effects at this time  She began taking the Wellbutrin 300mg daily starting yesterday  She reports that she is sleeping well overnight and her appetite is fair  She denies any acute thoughts of self harm, SI or HI  She dies AH/VH and does not appear internally preoccupied  She does not require any refills at this time            Current Medication:     Current Outpatient Medications:   •  buPROPion (Wellbutrin XL) 150 mg 24 hr tablet, Take 1 tablet (150 mg total) by mouth daily, Disp: 7 tablet, Rfl: 0  •  cloNIDine (Catapres) 0 1 mg tablet, Take 1 tablet (0 1 mg total) by mouth 2 (two) times a day as needed (anxiety), Disp: 60 tablet, Rfl: 1  •  divalproex sodium (DEPAKOTE ER) 500 mg 24 hr tablet, Take 1,750 mg by mouth daily at bedtime, Disp: , Rfl:   •  gabapentin (Neurontin) 300 mg capsule, Take 1 capsule (300 mg total) by mouth 3 (three) times a day, Disp: 90 capsule, Rfl: 1  •  ibuprofen (MOTRIN) 200 mg tablet, Take by mouth every 6 (six) hours as needed for mild pain, Disp: , Rfl:   •  lurasidone (Latuda) 20 mg tablet, Take 20 mg by mouth daily with breakfast, Disp: , Rfl:   •  Multiple Vitamin (multivitamin) capsule, Take 1 capsule by mouth daily, Disp: , Rfl:   Medication side effects: none    ROS:   As above otherwise negative    Active Ambulatory Problems     Diagnosis Date Noted " • Fatigue 10/19/2017   • Menorrhagia with regular cycle 10/19/2017   • Intrinsic eczema 05/02/2019   • Alcohol use disorder, severe, dependence ([facility] ) 10/03/2020   • Varicose veins of left lower extremity with pain 02/26/2021   • Spider veins of both lower extremities 09/14/2021   • Bipolar I disorder, severe, current or most recent episode depressed, with mixed features ([place] [address] ) 03/15/2023   • Alcohol use disorder, severe, in early remission ([place] [address] ) 05/09/2023   • Anxiety 05/09/2023     Resolved Ambulatory Problems     Diagnosis Date Noted   • Bipolar 2 disorder ([place] [address] ) 12/26/2012   • Medical clearance for psychiatric admission 10/02/2020   • Alcohol abuse 10/02/2020     Past Medical History:   Diagnosis Date   • Bipolar 1 disorder ([place] [address] )    • Depression    • Varicose veins of left lower extremity      Family History   Problem Relation Age of Onset   • No Known Problems Mother    • Diabetes Father    • Hypertension Father    • No Known Problems Sister    • No Known Problems Sister    • No Known Problems Daughter    • No Known Problems Maternal Grandmother    • No Known Problems Paternal Grandmother    • No Known Problems Maternal Aunt    • No Known Problems Maternal Aunt    • No Known Problems Maternal Aunt    • No Known Problems Maternal Aunt    • No Known Problems Maternal Aunt    • No Known Problems Maternal Aunt    • No Known Problems Paternal Aunt      Social History     Socioeconomic History   • Marital status: /Civil Union     Spouse name: Not on file   • Number of children: 3   • Years of education: Not on file   • Highest education level: Not on file   Occupational History   • Occupation: Unemployed   Tobacco Use   • Smoking status: Former   • Smokeless tobacco: Never   • Tobacco comments:     quit 6 months ago   Vaping Use   • Vaping Use: Never used   Substance and Sexual Activity   • Alcohol use: Not Currently     Alcohol/week: 6 0 standard drinks     Types: 6 Cans of beer per week     Comment: hx ETOH abuse   • Drug use: No   • Sexual activity: Yes     Partners: Male   Other Topics Concern   • Not on file   Social History Narrative   • Not on file     Social Determinants of Health     Financial Resource Strain: Not on file   Food Insecurity: Not on file   Transportation Needs: Not on file   Physical Activity: Not on file   Stress: Not on file   Social Connections: Not on file   Intimate Partner Violence: Not on file   Housing Stability: Not on file     No Known Allergies       Mental Status Evaluation:    Appearance:  casually dressed, adequate grooming   Behavior:  pleasant, cooperative, calm   Speech:  normal rate and volume   Mood:  euthymic   Affect:  constricted   Thought Process:  organized, goal directed, linear, normal rate of thoughts   Associations: intact associations   Thought Content:  no overt delusions   Perceptual Disturbances: denies auditory or visual hallucinations when asked, does not appear responding to internal stimuli   Risk Potential: Suicidal ideation - None at present  Homicidal ideation - None   Sensorium:  oriented to person, place, time/date and situation   Memory:  recent and remote memory grossly intact   Consciousness:  alert and awake   Attention: attention span and concentration are age appropriate   Insight:  age appropriate   Judgment: age appropriate   Gait/Station: unable to assess   Motor Activity: unable to assess today due to virtual visit       Laboratory results:   I have personally reviewed all pertinent laboratory/tests results    Most Recent Labs:   Lab Results   Component Value Date    WBC 7 40 06/14/2021    RBC 4 65 06/14/2021    HGB 14 0 06/14/2021    HCT 41 6 (L) 06/14/2021     06/14/2021    RDW 13 1 06/14/2021    NEUTROABS 5 78 02/15/2021    SODIUM 133 (L) 06/14/2021    K 4 1 06/14/2021     06/14/2021    CO2 25 06/14/2021    BUN 12 06/14/2021    CREATININE 0 69 06/14/2021    GLUC 97 10/05/2020    GLUF 112 (H) 06/14/2021    CALCIUM 8 6 06/14/2021 AST 20 06/14/2021    ALT 17 06/14/2021    ALKPHOS 26 (L) 06/14/2021    TP 6 2 (L) 06/14/2021    ALB 3 9 06/14/2021    TBILI 0 30 06/14/2021    CHOLESTEROL 201 (H) 02/15/2021    HDL 80 02/15/2021    TRIG 72 02/15/2021    LDLCALC 107 (H) 02/15/2021    [place] 121 02/15/2021    VALPROICTOT 70 5 10/06/2020    BWQ2SCSFCEKF 0 946 02/15/2021    PREGUR negative 10/02/2020    RPR Non-Reactive 10/05/2020    HGBA1C 5 3 03/16/2023     03/16/2023         Assessment   Diagnoses and all orders for this visit:    Bipolar I disorder, severe, current or most recent episode depressed, with mixed features ([facility] [address] )    Anxiety         Plan    Planned medication and treatment changes:    Continue all current medications  All current active medications have been reviewed  Continue treatment with group therapy and partial program      Risks / Benefits of Treatment:    Risks, benefits, and possible side effects of medications explained to patient and patient verbalizes understanding and agrees to medications  Counseling / Coordination of Care:    Patient's progress discussed with staff in treatment team meeting  Medications, treatment progress and treatment plan reviewed with patient  SARAH Rey 05/16/23    Video Exam    There were no vitals filed for this visit      Physical Exam     Visit Time    Visit Start Time: 4703  Visit Stop Time: 0940  Total Visit Duration: 10 minutes

## 2023-05-17 ENCOUNTER — TELEPHONE (OUTPATIENT)
Dept: PSYCHIATRY | Facility: CLINIC | Age: 53
End: 2023-05-17

## 2023-05-17 ENCOUNTER — OFFICE VISIT (OUTPATIENT)
Dept: PSYCHOLOGY | Facility: CLINIC | Age: 53
End: 2023-05-17

## 2023-05-17 DIAGNOSIS — F41.9 ANXIETY: ICD-10-CM

## 2023-05-17 DIAGNOSIS — F31.4 BIPOLAR I DISORDER, SEVERE, CURRENT OR MOST RECENT EPISODE DEPRESSED, WITH MIXED FEATURES (HCC): Primary | ICD-10-CM

## 2023-05-17 DIAGNOSIS — F10.21 ALCOHOL USE DISORDER, SEVERE, IN EARLY REMISSION (HCC): ICD-10-CM

## 2023-05-17 NOTE — TELEPHONE ENCOUNTER
Patient due for [facility] discharge has been scheduled with Marky Garcia for therapy     6/7  @ 3p

## 2023-05-17 NOTE — PSYCH
Subjective:     Patient ID: Nida Bran is a 47 y o  female  [facility] Clinical Progress Notes      Specialized Services Documentation  Therapist must complete separate progress note for each specific clinical activity in which the individual participated during the day  Allied Therapy   Group was facilitated virtually in a private office using HIPAA compliant and approved "[employer] Teams  Nida Bran  consented to the use of tele-video modality of treatment and was virtually present for group allied therapy  [id number] Nida Bran actively shared in [facility] group focused on mindfulness  Courtney Bridges was observed to be engaged in a therapist led mindfulness activity where the group ángel objects based off lose descriptions  Group engaged in a mindful listening activity where they were encouraged to focus on their breath, body and thoughts  Group participated in a five senses mindfulness activity and was provided with handouts on mindfulness  Courtney Bridges shared that she would practice the five senses mindfulness technique  Some effort noted toward treatment goal  Continue AT to encourage development and practice of mindfulness     Tx Plan Objective: 1 1,1 2,1 4, Therapist:  KAYLIN Le

## 2023-05-17 NOTE — PSYCH
Visit Time    Visit Start Time: 0930  Visit Stop Time: 0603  Total Visit Duration: 60 minutes    Subjective:     Patient ID: Radha Waterman is a 47 y o  female  [facility] Clinical Progress Notes      Specialized Services Documentation  Therapist must complete separate progress note for each specific clinical activity in which the individual participated during the day  Group Psychotherapy - Decatastrophizing   This group was facilitated virtually in a private office using HIPAA compliant and approved [employer] Teams  Radha Waterman consented to the use of tele-video modality of treatment  Radha Waterman attended group on decatrastrophizing  This  facilitated a discussion on:   Catastrophic thinking patters   Examples of catastrophic thoughts   What trigger these thoughts? What emotions are associated? Group members explored their use of catastrophic thinking and applied six suggestions to challenge those thoughts  Members were provided a decatrastrophizing worksheet that they were encouraged to complete using their example of catastrophic thinking  Members were encouraged to continue to use the materials outside of group  Radha Waterman made good efforts towards progress goals which were displayed through participation, note taking, and engagement in topic  Madhuri Rosales shared her perspective on this thinking and questioned the science between why our brain think like this       Tx Plan Objective: 1 1,1 2,1 4  Therapist: Emily Mir

## 2023-05-17 NOTE — PSYCH
Subjective:     Patient ID: Taina Teresa is a 47 y o  female  [facility] Clinical Progress Notes      Specialized Services Documentation  Therapist must complete separate progress note for each specific clinical activity in which the individual participated during the day  Group Psychotherapy  ([id number]) Group was facilitated virtually in a private office using HIPAA Compliant and Approved [employer] Teams  Taina Teresa consented to the use of tele-video modality of treatment and was virtually present for group psychotherapy today  she attentively listened to Vishyue share her life story as she co-led this session  Group encouraged power of learning about self, accepting illness and personal responsibility in recovery  Community resources reviewed in addition to personal resources like the affirmations  Progress toward goals noted  Continue psychotherapy to encourage self -awareness and healthy engagement of supports  [facility] Objectives: 1 1, 1 2, 1 4   Therapist: CATHY Castillo, RATNA     Education Therapy   [id number] Taina Teresa actively shared in morning assessment and goal review  Presented as Receptive related to readiness to learn  Taina Teresa did complete goal from last treatment day identifying gaining support and hope  did not present with any barriers to learning  [address] engaged throughout the treatment day  Was engaged in learning related to Illness, Medication, Aftercare, and Wellness Tools  Staff utilized Verbal, Written, A/V, and Demonstration teaching methods  Taina Teresa shared area of learning and set a goal for outside of program to go to her son's baseball game and use her 5 senses when anxious        Tx Plan Objective: 1 1,1 2,1 4 Therapist:  CATHY Castillo, RATNA    Case Management Note    CATHY Castillo    Current suicide risk : Low     [id number]- Antone Harada is working on her distorted thinking and is going to work on one or two "distorted thoughts a day using thought records worksheet  Anita Licona realizes that she never practiced the coping skills once she learned them and stated that \"Doing the same thing equals the same results  \" Anita Licona will be off tomorrow due to work and will discuss her work schedule for next week and inform a   Anita Licona was informed that this writer will be on vacation and return to work on 5/23/23   Anita Licona was encouraged to reach out to other  if needed or the   Informed of next case management with this writer for next week  Informed via email that an appointment was set up for her to see therapist Jose Manuel Zelaya for 6/7 at 3pm     Medications changes/added/denied? No    Treatment session number: 6    Individual Case Management Visit provided today?  Yes     [facility] follow up physician's orders: none at this time    "

## 2023-05-17 NOTE — PSYCH
Virtual Regular Visit    Verification of patient location:    Patient is located at Home in the following state in which I hold an active license [place]      Assessment/Plan:    Problem List Items Addressed This Visit        Other    Bipolar I disorder, severe, current or most recent episode depressed, with mixed features ([facility] [facility] ) - Primary    Alcohol use disorder, severe, in early remission ([facility] [facility] )    Anxiety       Goals addressed in session:           Reason for visit is PHP VIRTUAL GROUP DUE TO virtual preference of care         Encounter provider Campbell Sawyer    Provider located at [address] [place] [phone]      Recent Visits  Date Type Provider Dept   05/16/23 Office Visit [facility] PARTIAL PSYCH GROUP THERAPY [facility]   05/12/23 Office Visit [facility] PARTIAL PSYCH GROUP THERAPY [facility]   05/11/23 Office Visit [facility] PARTIAL PSYCH GROUP THERAPY [facility]   05/10/23 Office Visit [facility] PARTIAL PSYCH GROUP THERAPY [facility]   Showing recent visits within past 7 days and meeting all other requirements  Today's Visits  Date Type Provider Dept   05/17/23 Office Visit [facility] PARTIAL PSYCH GROUP THERAPY [facility]   Showing today's visits and meeting all other requirements  Future Appointments  No visits were found meeting these conditions  Showing future appointments within next 150 days and meeting all other requirements       The patient was identified by name and date of birth  Taina Teresa was informed that this is a telemedicine visit and that the visit is being conducted [employer]  She agrees to proceed     My office door was closed  No one else was in the room  She acknowledged consent and understanding of privacy and security of the video platform   The patient has agreed to participate and understands they can discontinue the visit at any time  Patient is aware this is a billable service  Subjective  Klaus Galarza is a 47 y o  female    HPI     Past Medical History:   Diagnosis Date   • Alcohol abuse    • Anxiety    • Bipolar 1 disorder ([address] )    • Depression    • Varicose veins of left lower extremity        Past Surgical History:   Procedure Laterality Date   •  SECTION     • VEIN LIGATION Left 2021    Procedure: multiple stab phlebectomies left lower extremity, no stripping and ligation no laser ablation;  Surgeon: Ruslan Gordillo MD;  Location: [address] [place] [address] OR;  Service: Vascular       Current Outpatient Medications   Medication Sig Dispense Refill   • buPROPion (Wellbutrin XL) 300 mg 24 hr tablet Take 1 tablet (300 mg total) by mouth daily     • cloNIDine (Catapres) 0 1 mg tablet Take 1 tablet (0 1 mg total) by mouth 2 (two) times a day as needed (anxiety) 60 tablet 1   • divalproex sodium (DEPAKOTE ER) 500 mg 24 hr tablet Take 1,750 mg by mouth daily at bedtime     • gabapentin (Neurontin) 300 mg capsule Take 1 capsule (300 mg total) by mouth 3 (three) times a day 90 capsule 1   • ibuprofen (MOTRIN) 200 mg tablet Take by mouth every 6 (six) hours as needed for mild pain     • lurasidone (Latuda) 20 mg tablet Take 20 mg by mouth daily with breakfast     • Multiple Vitamin (multivitamin) capsule Take 1 capsule by mouth daily       No current facility-administered medications for this visit  No Known Allergies    Review of Systems    Video Exam    There were no vitals filed for this visit  Physical Exam     I spent FOUR GROUP HOURS PLUS CASE MANAGEMENT minutes with patient today in which greater than 50% of the time was spent in counseling/coordination of care regarding PHP - SEE NOTES

## 2023-05-18 ENCOUNTER — DOCUMENTATION (OUTPATIENT)
Dept: PSYCHOLOGY | Facility: CLINIC | Age: 53
End: 2023-05-18

## 2023-05-18 ENCOUNTER — APPOINTMENT (OUTPATIENT)
Dept: PSYCHOLOGY | Facility: CLINIC | Age: 53
End: 2023-05-18
Payer: COMMERCIAL

## 2023-05-18 NOTE — PROGRESS NOTES
Subjective:     Patient ID: Ellen Mccauley is a 47 y o  female  [facility] Clinical Progress Notes      Specialized Services Documentation  Therapist must complete separate progress note for each specific clinical activity in which the individual participated during the day  Case Management Note    KAYLIN Casey    Current suicide risk : Unable to assess due to absence  No CM scheduled for today, as today was scheduled day off from [facility]  No CM requested  Next CM scheduled for 05/19/23  Medications changes/added/denied? No    Treatment session number: n/a    Individual Case Management Visit provided today? No    [facility] follow up physician's orders: none at this time

## 2023-05-19 ENCOUNTER — OFFICE VISIT (OUTPATIENT)
Dept: PSYCHOLOGY | Facility: CLINIC | Age: 53
End: 2023-05-19

## 2023-05-19 DIAGNOSIS — F31.4 BIPOLAR I DISORDER, SEVERE, CURRENT OR MOST RECENT EPISODE DEPRESSED, WITH MIXED FEATURES (HCC): Primary | ICD-10-CM

## 2023-05-19 DIAGNOSIS — F41.9 ANXIETY: ICD-10-CM

## 2023-05-19 NOTE — BH TREATMENT PLAN
"Assessment/Plan:       Diagnoses and all orders for this visit:     Bipolar I disorder, severe, current or most recent episode depressed, with mixed features ([facility] [address] )     Alcohol use disorder, severe, in early remission [facility])     Anxiety            Subjective:      Patient ID: Foreign Saucedo is a 47 y o  female      [facility] Treatment Plan   AREAS OF NEED: Bipolar 1 disorder,severe, current or most recent episode depressed with mixed features  Alcohol use disorder severe in early remission and anxiety as evidenced by racing and ruminating thoughts, being fidgety, decrease interest, decrease in concentration , worrying, lack of energy - just wanting to stay in bed all day, decrease in hopelessness, and recent alcohol abuse due to relapse in alcohol use  Date Initiated: 05/09/23     Strengths: \"determined to get better, good mom and good wife  \"     LONG TERM GOAL:   Date Initiated: 05/09/23  1 0 I will identify and share two ways I feel relief of anxiety symptoms and improvement in ability to function independently  Target Date: 6/6/23  Completion Date:         SHORT TERM OBJECTIVES:      Date Initiated: 05/09/23  1 1 I will identify emotions at least once a day and will implement an appropriate coping skill that will help regulate that emotion  Revision Date: 05/19/23 due to absence  Target Date: 5/18/23  Completion Date:      Date Initiated: 05/09/23  1 2 I will share examples of how I am practicing acceptance and describe the challenges I faced in doing so  Revision Date: 05/19/23 due to absence  Target Date: 5/18/23  Completion Date:     Date Initiated: 05/09/23  1 3 I will take medications as prescribed and share questions and concerns if arise  Revision Date:05/19/23 due to absence  Target Date: 5/18/23  Completion Date:      Date Initiated: 05/09/23  1 4 I will identify 3 ways my supports can assist in my recovery and agree to staff/support contact as indicated      Revision Date:05/19/23 due to " absence  Target Date: 5/18/23  Completion Date:            7 DAY REVISION:  1 5 I will use the thought record sheet to work through one cognitive distortion each day to change the negative thoughts to positive thoughts     Date Initiated: 05/19/23   Revision Date:   Target Date: 05/31/23  Completion Date:        PSYCHIATRY:  Date Initiated:  05/09/23  Medication Management and Education       Revision Date: 05/19/23        1 3 Continue medication management      The person(s) responsible for carrying out the plan is Alva Cooley DO     NURSING/SYMPTOM EDUCATION:   Date Initiated: 05/09/23  1 1, 1 2  1 3, 1 4 This RN/Or Therapist will provide wellness/symptoms and skill education groups three to five days weekly to educate Rosa Jacques on signs and symptoms of diagnoses, skills to manage, and medication questions that will be addressed by the treatment team     Revision date: 05/19/23        1 1,1 2,1 3,1 4,1 5 Continue to encourage Rosa Jacques to participate in wellness groups daily to learn about symptoms, coping strategies and warning signs to promote relapse prevention  The person(s) responsible for carrying out the plan is CATHY Wooten, HILARIAW ; Navin Agarwal RN, BSN     PSYCHOLOGY:   Date Initiated: 05/09/23       1 1, 1 2, 1 4 Provide psychotherapy group 5 times per week to allow opportunity for Rosa Jacques  to explore stressors and ways of coping  Revision Date: 05/19/23   1 1,1 2,1 4,1 5  Continue to provide psychotherapy group daily to Rosa Jacques and encourage sharing of stressors, skills and positive change  The person(s) responsible for carrying out the plan is CATHY Wooten,RATNA; MARILYN [facility]     ALLIED THERAPY:   Date Initiated: 05/09/23  1 1,1 2 Engage Rosa Jacques in AT group 5 times weekly to encourage development and use of wellness tools to decrease symptoms and promote recovery through meaningful activity    Revision Date: 05/19/23   1 1,1 2,1 5 Continue to engage Milena Hensley to participate in AT group to practice wellness tools within program and transfer to home sharing successes and barriers through healthy task involvement  The person(s) responsible for carrying out the plan is KAYLIN White ; KAYLIN De Leon     CASE MANAGEMENT:   Date Initiated: 05/09/23      1 0 This  will meet with Milena Hensley  3-4 times weekly to assess treatment progress, discharge planning, connection to community supports and UR as indicated  Revision Date: 05/19/23   1 0 Continue to meet with Milena Hensley 3-4 times weekly to assess growth, work toward goals, continued treatment needs, dc planning and use of supports  The person(s) responsible for carrying out the plan is Phil Mckeon     TREATMENT REVIEW/COMMENTS:      DISCHARGE CRITERIA: Identify 3 signs of progress and complete relapse prevention plan  DISCHARGE PLAN: Connect with identified outpatient providers  Estimated Length of Stay: 10 treatment days         Diagnosis and Treatment Plan explained to Simran Sequeira relates understanding diagnosis and is agreeable to Treatment Plan          CLIENT COMMENTS / Please share your thoughts, feelings, need and/or experiences regarding your treatment plan with Staff  Kiki Torres see follow up note with comments      Signatures can be found on [facility] Treatment plan consent form

## 2023-05-19 NOTE — PSYCH
"Subjective:     Patient ID: Radha Waterman is a 47 y o  female  [facility] Clinical Progress Notes      Specialized Services Documentation  Therapist must complete separate progress note for each specific clinical activity in which the individual participated during the day  Allied Therapy   Group was facilitated virtually in a private office using HIPAA compliant and approved Microsoft Teams  Radha Waterman consented to the use of tele-video modality of treatment and was virtually present for group allied therapy  Bro actively shared in [facility] group focused on gratitude  Madhuri Rosales was observed to be engaged in therapist led discussion on turning \"I'm sorry\" into \"thank you\" as well as a pass the card activity where group members wrote a gratitude for the person next to them  Madhuri Rosales practiced writing their own gratitude statements and did not share one of them  Group engaged in a damien discussion on \"This\" by [facility] [employer]  Some effort noted toward treatment goal  Continue AT to encourage development and practice of gratitude  Tx Plan Objective: 1 1,1 2,1 4, Therapist:  KAYLIN Motley    Education Therapy   [id number] Radha Waterman actively shared in morning assessment and goal review  Presented as Receptive related to readiness to learn  Radha Waterman did complete goal from last treatment day identifying gaining education  did not present with any barriers to learning  1200 Adriana Sevilla engaged throughout the treatment day  Was engaged in learning related to Illness, Medication, Aftercare and Wellness Tools  Staff utilized Verbal, Written, A/V and Demonstration teaching methods  Radha Waterman shared area of learning and set a goal for outside of program to spend time with family and be aware of negative thoughts        Tx Plan Objective: 1 1,1 2,1 4, Therapist:  KAYLIN Motley    Case Management Note    KAYLIN Motley    Current suicide risk : Low     [id number] this " writer met with Maya Jarvis for case management  Mitchell Khoury stated that things are going okay today  Mitchell Elginbalaji will be attending groups on Tuesday, Wednesday and Friday next week  This writer notified Mitchell Khoury of medication check scheduled for 05/23/23 at 0800  This writer updated treatment plan goals with Mitchell Khoury to include goal of using the thought record sheet to work through one cognitive distortion each day to change negative thoughts to positive  Mitchelllachelle Khoury was agreeable to this writer signing on her behalf  No other concerns at this time  Ruben Parsons physically unable to provide a signature; however, I understand the nature of and am in agreement with the documentation presented to me via TEAMS  I have received a copy through My Chart and/or the [address] [facility] [facility] Postal Service  I freely give verbal consent  Name of Document (s): treatment plan consent  Witness to verbal consent: Black Jordan  Witness to verbal consent: Kamila Ureña RN      Medications changes/added/denied? No    Treatment session number: 7    Individual Case Management Visit provided today? Yes     [facility]ations follow up physician's orders: none at this time

## 2023-05-19 NOTE — PSYCH
Virtual Regular Visit    Verification of patient location:    Patient is located at Home in the following state in which I hold an active license [place]      Assessment/Plan:    Problem List Items Addressed This Visit        Other    Bipolar I disorder, severe, current or most recent episode depressed, with mixed features ([address] ) - Primary    Anxiety       Goals addressed in session: Goal 1          Reason for visit is VIRTUAL PHP GROUP DUE TO preferred virtual care  Encounter provider [address] PARTIAL PSYCH PROGRAM    Provider located at [address] [place] [address]   [address] [facility] [id number]      Recent Visits  Date Type Provider Dept   05/17/23 Office Visit [address] PARTIAL PSYCH GROUP THERAPY [facility]   05/16/23 Office Visit [address] PARTIAL PSYCH GROUP THERAPY [facility]   05/12/23 Office Visit [address] PARTIAL PSYCH GROUP THERAPY [facility]   Showing recent visits within past 7 days and meeting all other requirements  Today's Visits  Date Type Provider Dept   05/19/23 Office Visit [address] PARTIAL PSYCH GROUP THERAPY [facility]   Showing today's visits and meeting all other requirements  Future Appointments  No visits were found meeting these conditions  Showing future appointments within next 150 days and meeting all other requirements       The patient was identified by name and date of birth  Dena Burnett was informed that this is a telemedicine visit and that the visit is being conducted [employer]  She agrees to proceed     My office door was closed  No one else was in the room  She acknowledged consent and understanding of privacy and security of the video platform  The patient has agreed to participate and understands they can discontinue the visit at any time  Patient is aware this is a billable service  Subjective  Dena Burnett is a 47 y o  female         HPI     Past Medical History:   Diagnosis Date   • Alcohol abuse    • Anxiety    • Bipolar 1 disorder ([address] )    • Depression    • Varicose veins of left lower extremity        Past Surgical History:   Procedure Laterality Date   •  SECTION     • VEIN LIGATION Left 2021    Procedure: multiple stab phlebectomies left lower extremity, no stripping and ligation no laser ablation;  Surgeon: Mikaela Reyes MD;  Location: [address] [place] [address] MAIN OR;  Service: Vascular       Current Outpatient Medications   Medication Sig Dispense Refill   • buPROPion (Wellbutrin XL) 300 mg 24 hr tablet Take 1 tablet (300 mg total) by mouth daily     • cloNIDine (Catapres) 0 1 mg tablet Take 1 tablet (0 1 mg total) by mouth 2 (two) times a day as needed (anxiety) 60 tablet 1   • divalproex sodium (DEPAKOTE ER) 500 mg 24 hr tablet Take 1,750 mg by mouth daily at bedtime     • gabapentin (Neurontin) 300 mg capsule Take 1 capsule (300 mg total) by mouth 3 (three) times a day 90 capsule 1   • ibuprofen (MOTRIN) 200 mg tablet Take by mouth every 6 (six) hours as needed for mild pain     • lurasidone (Latuda) 20 mg tablet Take 20 mg by mouth daily with breakfast     • Multiple Vitamin (multivitamin) capsule Take 1 capsule by mouth daily       No current facility-administered medications for this visit  No Known Allergies    Review of Systems    Video Exam    There were no vitals filed for this visit  Physical Exam     Visit Time  I have spent FOUR GROUP HOURS + CASE MANAGEMENT minutes with patient today in which greater than 50% of the time was spent in counseling/coordination of care regarding PHP- see notes

## 2023-05-19 NOTE — PSYCH
Subjective:     Patient ID: Sulma Drew is a 47 y o  female  [facility] Clinical Progress Notes      Specialized Services Documentation  Therapist must complete separate progress note for each specific clinical activity in which the individual participated during the day  Group Psychotherapy Group Psychotherapy  (9:30-10:30)  This group was facilitated virtually in a private office using HIPAA Compliant and Approved Microsoft Teams  Kathyanne Severs Devitt  consented to the use of tele-video modality of treatment  This group was on non-judgment as a core component of mindfulness  Participants discussed the human tendencies to  and categorize things in every day life and reflected on judgments made at the start of the day  Participants learned how judgments can be a negative mental habit that can lead to unwanted suffering and negative mood  Participants watched a brief video by Rober Guevara on non judgement and processed the content as well as video on Blame by Patrick Torres connected to the judgment of others  Participants reflected on the sources of negative judgements in their lives  Participants then watched an instructional DBT video and learned about re-framing judgmental thoughts in order to reduce suffering and improve mood  Hilary Crocker was present but did not participate  She is recommended to continue learning and utilizing skills learned in group to meet her treatment goals           Tx Goal Objective: 1 1, 1 2  Therapist: RATNA Mendiola

## 2023-05-19 NOTE — PSYCH
Subjective:     Patient ID: Ellen Mccauley is a 47 y o  female  [facility] Clinical Progress Notes      Specialized Services Documentation  Therapist must complete separate progress note for each specific clinical activity in which the individual participated during the day  Group Psychotherapy     This group was facilitated virtually in a private office using HIPAA Compliant and Approved Microsoft Teams  Ellen Mccauley  consented to the use of tele-video modality of treatment  ([id number])  Ellen Mccauley  actively shared in psychotherapy group exploring the weekly wellness assessment  Participants explored successes and challenges in wellness areas throughout this past week - physical, social, vocational, spiritual, cognitive, and emotional   Some positive progress toward goal noted  Continue psychotherapy to encourage self-awareness and home practice of skills to support wellness      Treatment Plan Objective: 1 1, 1 2, 1 3, 1 4    Therapist: Paty LINTON RN and Paula Newby [facility]

## 2023-05-22 ENCOUNTER — APPOINTMENT (OUTPATIENT)
Dept: PSYCHOLOGY | Facility: CLINIC | Age: 53
End: 2023-05-22
Payer: COMMERCIAL

## 2023-05-22 ENCOUNTER — DOCUMENTATION (OUTPATIENT)
Dept: PSYCHOLOGY | Facility: CLINIC | Age: 53
End: 2023-05-22

## 2023-05-22 NOTE — PROGRESS NOTES
Subjective:     Patient ID: Sheri Issa is a 47 y o  female  [facility] Clinical Progress Notes      Specialized Services Documentation  Therapist must complete separate progress note for each specific clinical activity in which the individual participated during the day  Other [id number] this writer called Pauly Hall with [facility] [employer] with contact number [phone] ext [phone] and left voicemail asking to extend last authorized treatment day to account for excused absences on 05/18/23, 05/15/23 and 05/22/23  Last day attended PHP was 05/19/23 and was day 7  Waiting for returned call and will notify assigned CM upon return  NoCase Management Note    Jony Mason, KAYLIN    Current suicide risk : Unable to assess due to absence  No CM scheduled for today, as today was scheduled day off  No CM requested  Next CM for 05/23/23  Medications changes/added/denied? No    Treatment session number: n/a excused    Individual Case Management Visit provided today? No    Innovations follow up physician's orders: none at this time

## 2023-05-23 ENCOUNTER — APPOINTMENT (OUTPATIENT)
Dept: PSYCHOLOGY | Facility: CLINIC | Age: 53
End: 2023-05-23
Payer: COMMERCIAL

## 2023-05-23 ENCOUNTER — DOCUMENTATION (OUTPATIENT)
Dept: PSYCHOLOGY | Facility: CLINIC | Age: 53
End: 2023-05-23

## 2023-05-23 ENCOUNTER — TELEMEDICINE (OUTPATIENT)
Dept: PSYCHIATRY | Facility: CLINIC | Age: 53
End: 2023-05-23

## 2023-05-23 DIAGNOSIS — F41.9 ANXIETY: ICD-10-CM

## 2023-05-23 DIAGNOSIS — F10.21 ALCOHOL USE DISORDER, SEVERE, IN EARLY REMISSION (HCC): ICD-10-CM

## 2023-05-23 DIAGNOSIS — F31.4 BIPOLAR I DISORDER, SEVERE, CURRENT OR MOST RECENT EPISODE DEPRESSED, WITH MIXED FEATURES (HCC): Primary | ICD-10-CM

## 2023-05-23 RX ORDER — DIVALPROEX SODIUM 250 MG/1
TABLET, EXTENDED RELEASE ORAL
COMMUNITY
Start: 2023-03-10

## 2023-05-23 NOTE — PROGRESS NOTES
Subjective:     Patient ID: Angela Caro is a 47 y o  female  [facility] Clinical Progress Notes      Specialized Services Documentation  Therapist must complete separate progress note for each specific clinical activity in which the individual participated during the day  Sherif Lemon [facility] called to confirm that letter was faxed requesting extension of PHP days  Letter was faxed today at [phone]  Case Management Note    CATHY Byers    Current suicide risk : Unable to assess due to absence  Angela Caro was excused from  program today 05/23/23 due to broken tooth and needed to go to the dentist appointment  Medications changes/added/denied? No    Treatment session number: N/A    Individual Case Management Visit provided today? No    Innovations follow up physician's orders: None at this time

## 2023-05-23 NOTE — PSYCH
"    Virtual Regular Visit    Verification of patient location:    Patient is located at Other in the following state in which I hold an active license [place]           Encounter provider Haja Mccoy PA-C    Provider located at  [address] [facility] [address]  [address] [address] [phone]      Recent Visits  Date Type Provider Dept   05/16/23 Telemedicine [facility]   Showing recent visits within past 7 days and meeting all other requirements  Future Appointments  No visits were found meeting these conditions  Showing future appointments within next 150 days and meeting all other requirements       The patient was identified by name and date of birth  Oscar Grubbs was informed that this is a telemedicine visit and that the visit is being conducted [employer]  She agrees to proceed     My office door was closed  No one else was in the room  She acknowledged consent and understanding of privacy and security of the video platform  The patient has agreed to participate and understands they can discontinue the visit at any time  Patient is aware this is a billable service  Progress Note - Behavioral Health   [facility] Bhavanisharmaine Gruber 47 y o  female MRN: [id number]     Progress at partial program: improving  Chart reviewed and discussed in treatment team   Ciara Rivera is at the gym during this appt  Last seen 5/16 by SARAH Madrigal at which time meds unchanged  Ciara Rivera reports doing well and benefiting from program   Is back to work 2 days/week and this is going well  States her anxiety \"has come down tremendously\" and she has had no urges to drink  Sleep is pretty good and no need for prn clonidine  Denies side effects to meds inc abdominal pain, nausea, vomiting, tremor, invol movements, dizziness  Does have occ  Headache      Wants to continue to work on her " "\"distorted thinking\"  ROS:   As above otherwise negative    Active Ambulatory Problems     Diagnosis Date Noted   • Fatigue 10/19/2017   • Menorrhagia with regular cycle 10/19/2017   • Intrinsic eczema 05/02/2019   • Alcohol use disorder, severe, dependence ([facility] ) 10/03/2020   • Varicose veins of left lower extremity with pain 02/26/2021   • Spider veins of both lower extremities 09/14/2021   • Bipolar I disorder, severe, current or most recent episode depressed, with mixed features ([facility] ) 03/15/2023   • [facility] use disorder, severe, in [facility] ) 05/09/2023   • Anxiety 05/09/2023     Resolved Ambulatory Problems     Diagnosis Date Noted   • Bipolar 2 disorder ([place] [address] ) 12/26/2012   • Medical clearance for psychiatric admission 10/02/2020   • Alcohol abuse 10/02/2020     Past Medical History:   Diagnosis Date   • B[facility] disorder ([facility] )    • Depression    • Varicose veins of left lower extremity      No Known Allergies        Mental Status Evaluation:    Appearance:  age appropriate, adequate grooming   Behavior:  pleasant, cooperative   Speech:  normal rate and volume   Mood:  improved   Affect:  normal range and intensity   Thought Process:  goal directed   Associations: intact associations   Thought Content:  no overt delusions   Perceptual Disturbances: none   Risk Potential: Suicidal ideation - None  Homicidal ideation - None   Sensorium:  oriented to person, place and time/date   Memory:  recent and remote memory grossly intact   Consciousness:  alert and awake   Attention: attention span and concentration are age appropriate   Insight:  intact   Judgment: intact   Gait/Station: unable to assess   Motor Activity: unable to assess today due to virtual visit       Laboratory results: I have personally reviewed all pertinent laboratory/tests results        Assessment   Diagnoses and all orders for this visit:    Bipolar I disorder, severe, current or most recent episode depressed, with mixed features " ([facility] )    Alcohol use disorder, severe, in early remission ([facility] )    Anxiety       Current Outpatient Medications   Medication Sig Dispense Refill   • buPROPion (Wellbutrin XL) 300 mg 24 hr tablet Take 1 tablet (300 mg total) by mouth daily     • cloNIDine (Catapres) 0 1 mg tablet Take 1 tablet (0 1 mg total) by mouth 2 (two) times a day as needed (anxiety) 60 tablet 1   • divalproex sodium (DEPAKOTE ER) 500 mg 24 hr tablet Take 1,750 mg by mouth daily at bedtime     • gabapentin (Neurontin) 300 mg capsule Take 1 capsule (300 mg total) by mouth 3 (three) times a day 90 capsule 1   • ibuprofen (MOTRIN) 200 mg tablet Take by mouth every 6 (six) hours as needed for mild pain     • lurasidone (Latuda) 20 mg tablet Take 20 mg by mouth daily with breakfast     • Multiple Vitamin (multivitamin) capsule Take 1 capsule by mouth daily       No current facility-administered medications for this visit  Plan    Planned medication and treatment changes:   No med change  Cont Depakote er 1750 mg/d, latuda 20 mg daily, neurontin 300 mg tid, wellbutrin xl 300 mg/d  Has not needed prn clonidine  All current active medications have been reviewed  Continue treatment with group therapy and partial program      Risks / Benefits of Treatment:    Risks, benefits, and possible side effects of medications explained to patient and patient verbalizes understanding and agrees to medications  Counseling / Coordination of Care:    Patient's progress discussed with staff in treatment team meeting  Medications, treatment progress and treatment plan reviewed with patient      Haja Mccoy PA-C 05/23/23

## 2023-05-24 ENCOUNTER — OFFICE VISIT (OUTPATIENT)
Dept: PSYCHOLOGY | Facility: CLINIC | Age: 53
End: 2023-05-24

## 2023-05-24 DIAGNOSIS — F31.4 BIPOLAR I DISORDER, SEVERE, CURRENT OR MOST RECENT EPISODE DEPRESSED, WITH MIXED FEATURES (HCC): Primary | ICD-10-CM

## 2023-05-24 DIAGNOSIS — F10.21 ALCOHOL USE DISORDER, SEVERE, IN EARLY REMISSION (HCC): ICD-10-CM

## 2023-05-24 DIAGNOSIS — F41.9 ANXIETY: ICD-10-CM

## 2023-05-24 NOTE — PSYCH
Visit Time    Visit Start Time: 0930  Visit Stop Time: 8661  Total Visit Duration: 60 minutes    Subjective:     Patient ID: Andrews Vela is a 47 y o  female  [facility] Clinical Progress Notes      Specialized Services Documentation  Therapist must complete separate progress note for each specific clinical activity in which the individual participated during the day  Group Psychotherapy - Self Esteem  This group was facilitated virtually in a private office using HIPAA compliant and approved Microsoft teams  Andrews Vela consented to the use of tele-video modality of treatment  Andrews Vela attended group on self-esteem  This leader began the group discussion with each member identifying a personal strength  Andrews Vela identified emapathy as their strength  This leader then connected everyone's strengths to the concept of self-esteem  We reviewed the types of self esteem followed by a discussion of the personal experience of self-esteem as well as the role it plays in their life  Members were provided a self-esteem journal to complete outside of programming  Andrews Vela made some efforts towards progress goals which were displayed through prompted participation and engagement in topic  Tx Plan Objective: 1 1,1 2,1 4,1 5  Therapist: ANA Nieto Co-Facilitate: RATNA Salmon    Education Therapy   [id number] Andrews Vela actively shared in morning assessment and goal review  Presented as Receptive related to readiness to learn  Andrews Vela did not complete goal from last treatment day identifying wanting to gain hope  did not present with any barriers to learning  1200 Adriana Sevilla engaged throughout the treatment day  Was engaged in learning related to Illness, Medication, Aftercare, and Wellness Tools  Staff utilized Verbal, Written, A/V, and Demonstration teaching methods    Andrews Vela shared area of learning and set a goal for outside of program to call psychiatrist and complete chores        Tx Plan Objective: 1 1,1 2,1 4, Therapist: Emily Sood

## 2023-05-24 NOTE — PSYCH
Subjective:     Patient ID: Angela Caro is a 47 y o  female  [facility] Clinical Progress Notes      Specialized Services Documentation  Therapist must complete separate progress note for each specific clinical activity in which the individual participated during the day  Group Psychotherapy Life Skills ([id number]) Angela Caro actively engaged in an open processing group which was facilitated virtually in a private office using HIPAA Compliant and Approved Microsoft Teams  Naresh Richards consented to the use of tele-video modality of treatment and was virtually present for group psychotherapy today  The group discussed lack of support and boundaries and interpersonal effectiveness skills  Naresh Richards did participate in the conversations related to the topics  Naresh Richards continues to make progress towards goals through verbal participation in group; to accomplish long term goals continue to utilize skills learned in programming  Continue with psychotherapy to educate and encourage use of wellness tools  Tx Plan Objective: 1 1,1 2 Therapist: Linda Lopez ; Co-facilitated with ANA Thornton    Case Management Note    CATHY Byers    Current suicide risk : Low     [id number]- Naresh Richards reports that she has been working on her cognitive distortions by using cognitive reframing  Bronsonashley Maurice reports being more aware of her thinking but states that is can be challenging at times to reframe her thoughts  Discharge is planned for 5/26/23  Encouraged to call and make follow up appointments  Informed of CM schedule  Medications changes/added/denied? No    Treatment session number: 8    Individual Case Management Visit provided today?  Yes     [facility] follow up physician's orders: none at this time

## 2023-05-24 NOTE — PSYCH
Subjective:     Patient ID: Ralene Habermann is a 47 y o  female  [facility] Clinical Progress Notes      Specialized Services Documentation  Therapist must complete separate progress note for each specific clinical activity in which the individual participated during the day  Allied Therapy   Group was facilitated virtually in a private office using HIPAA compliant and approved Microsoft Teams  Ralene Habermann consented to the use of tele-video modality of treatment and was virtually present for group allied therapy  [id number] Ralene Habermann actively shared in [facility] focused on loneliness  [place] Moy was observed to be engaged in a therapist led damien analysis of “Sitting, Waiting, [place]  Group engaged in a discussion pertaining to social circles, how we spend our free time, and where we are or who we are with when we feel the most lonely  [place] Moy shared they would fight loneliness by getting out of her head by challenging her inner critic  Some effort noted toward treatment goal  Continue AT to encourage development and understanding of loneliness     Tx Plan Objective: 1 1,1 2,1 4, Therapist:  KAYLIN Choudhary

## 2023-05-24 NOTE — PSYCH
Virtual Regular Visit    Verification of patient location:    Patient is located at Home in the following state in which I hold an active license [place]      Assessment/Plan:    Problem List Items Addressed This Visit        Other    Bipolar I disorder, severe, current or most recent episode depressed, with mixed features ([facility] ) - Primary    Alcohol use disorder, severe, in early remission ([facility] )    Anxiety       Goals addressed in session:           Reason for visit is PHP VIRTUAL GROUP DUE TO virtual preference of care         Encounter provider Campbell Sawyer    Provider located at [address] [place] [address]  [address] [place] [id number]      Recent Visits  Date Type Provider Dept   05/19/23 Office Visit [facility] PARTIAL PSYCH GROUP THERAPY [facility]   05/17/23 Office Visit [facility] PARTIAL PSYCH GROUP THERAPY [facility]   Showing recent visits within past 7 days and meeting all other requirements  Future Appointments  No visits were found meeting these conditions  Showing future appointments within next 150 days and meeting all other requirements       The patient was identified by name and date of birth  Lindsay Nieves was informed that this is a telemedicine visit and that the visit is being conducted [employer]  She agrees to proceed     My office door was closed  No one else was in the room  She acknowledged consent and understanding of privacy and security of the video platform  The patient has agreed to participate and understands they can discontinue the visit at any time  Patient is aware this is a billable service  Subjective  Lindsay Nieves is a 47 y o  female          HPI     Past Medical History:   Diagnosis Date   • Alcohol abuse    • Anxiety    • Bipolar 1 disorder ([facility] )    • Depression    • Varicose veins of left lower extremity        Past Surgical History:   Procedure Laterality Date   •  SECTION     • VEIN LIGATION Left 2021    Procedure: multiple stab phlebectomies left lower extremity, no stripping and ligation no laser ablation;  Surgeon: Jax Valderrama MD;  Location: [address] [place] [address] MAIN OR;  Service: Vascular       Current Outpatient Medications   Medication Sig Dispense Refill   • buPROPion (Wellbutrin XL) 300 mg 24 hr tablet Take 1 tablet (300 mg total) by mouth daily     • cloNIDine (Catapres) 0 1 mg tablet Take 1 tablet (0 1 mg total) by mouth 2 (two) times a day as needed (anxiety) 60 tablet 1   • divalproex sodium (DEPAKOTE ER) 250 mg 24 hr tablet take 1 tablet by mouth at bedtime WITH 500MG TABS     • divalproex sodium (DEPAKOTE ER) 500 mg 24 hr tablet Take 1,750 mg by mouth daily at bedtime     • gabapentin (Neurontin) 300 mg capsule Take 1 capsule (300 mg total) by mouth 3 (three) times a day 90 capsule 1   • ibuprofen (MOTRIN) 200 mg tablet Take by mouth every 6 (six) hours as needed for mild pain     • lurasidone (Latuda) 20 mg tablet Take 20 mg by mouth daily with breakfast     • Multiple Vitamin (multivitamin) capsule Take 1 capsule by mouth daily       No current facility-administered medications for this visit  No Known Allergies    Review of Systems    Video Exam    There were no vitals filed for this visit  Physical Exam     I spent FOUR GROUP HOURS PLUS CASE MANAGEMENT minutes with patient today in which greater than 50% of the time was spent in counseling/coordination of care regarding PHP - SEE NOTES

## 2023-05-25 ENCOUNTER — APPOINTMENT (OUTPATIENT)
Dept: PSYCHOLOGY | Facility: CLINIC | Age: 53
End: 2023-05-25
Payer: COMMERCIAL

## 2023-05-25 ENCOUNTER — DOCUMENTATION (OUTPATIENT)
Dept: PSYCHOLOGY | Facility: CLINIC | Age: 53
End: 2023-05-25

## 2023-05-25 DIAGNOSIS — F10.21 ALCOHOL USE DISORDER, SEVERE, IN EARLY REMISSION (HCC): ICD-10-CM

## 2023-05-25 DIAGNOSIS — F31.4 BIPOLAR I DISORDER, SEVERE, CURRENT OR MOST RECENT EPISODE DEPRESSED, WITH MIXED FEATURES (HCC): Primary | ICD-10-CM

## 2023-05-25 DIAGNOSIS — F41.9 ANXIETY: ICD-10-CM

## 2023-05-25 NOTE — PROGRESS NOTES
Subjective:     Patient ID: Carmen Kurtz is a 47 y o  female  [facility] Clinical Progress Notes      Specialized Services Documentation  Therapist must complete separate progress note for each specific clinical activity in which the individual participated during the day  [facility] ([id number]) Voicemail was left for [employer] from Yolande lu contact number [phone] ext  [id number], using Tax ID [id number]  [facility] location address of [address] [place] [address] with a billing address [address] [place] [address] a call back to make sure fax was received and authorization was extended  Case Management Note    CATHY Chang    Current suicide risk : Unable to assess due to absence  Carmen Kurtz excused due to IOP status  Medications changes/added/denied? No    Treatment session number: N/A    Individual Case Management Visit provided today? No    [facility] follow up physician's orders: None at this time

## 2023-05-25 NOTE — PROGRESS NOTES
[facility] Discharge Instructions:   Disposition: Home  Address: [facility] [address]   Diagnosis:  1  Bipolar I disorder, severe, current or most recent episode depressed, with mixed features ([facility] )        2  Alcohol use disorder, severe, in early remission ([facility] )        3  Anxiety            Allergies (Drug/Food): No Known Allergies    Activity: No recommendations   Diet:no recommendations  Smoking Cessation: The best thing you can do to improve your health is to stop using tobacco  Diagnostic/Laboratory Orders: none at this time    Vaccines: If you received a vaccine, please notify your family physician on your next visit  For more information, please call [phone]  Follow-up appointments/Referrals:   Current Psychiatrist/Therapist:   Medication Management:   Francoise Knowles  [address]   [place], [address]  [phone]  Follow up: Awaiting a return call from [facility] [address] to set up outpatient follow up appointment      Outpatient Therapy: [address] [place]  [address]   [place], [address]  [phone]  Follow up: 6/7/23 at 3pm    Primary Care Physician:   [facility]   [address] [address]  [place], [address]  [phone]        Outpatient and/or Partial and Other Community Resources Used (CTT, ICM, VNA): none    [facility]: [place]ucky [phone] and Ana Luisa [phone]  Intake/Referral/Evaluation (Non-Emergency) *NON INSURED FOR FUNDING: [facility]: [phone], [facility]: [phone], [facility]: [phone] and [facility]: [phone]   Crisis Intervention (Emergency) [place] Service: [facility]: [phone], [place]: [phone], [facility]: [phone], [facility]): [phone], [facility] Day: [phone] and [facility]: [phone]  _________________________________  [place] Epi Intervention Hotline: 9-715.739.6468  National Suicide Crisis Hotline: 7-594.410.7206  I, the undersigned, have received and understand the above instructions          Patient/Rep Signature: __________________________________       Date/Time: ______________       Physician Signature: ____________________________________      Date/Time: ______________               Signature: ________________________________       Date/Time: ______________

## 2023-05-26 ENCOUNTER — TELEPHONE (OUTPATIENT)
Dept: PSYCHIATRY | Facility: CLINIC | Age: 53
End: 2023-05-26

## 2023-05-26 ENCOUNTER — DOCUMENTATION (OUTPATIENT)
Dept: PSYCHOLOGY | Facility: CLINIC | Age: 53
End: 2023-05-26

## 2023-05-26 ENCOUNTER — APPOINTMENT (OUTPATIENT)
Dept: PSYCHOLOGY | Facility: CLINIC | Age: 53
End: 2023-05-26
Payer: COMMERCIAL

## 2023-05-26 DIAGNOSIS — F10.21 ALCOHOL USE DISORDER, SEVERE, IN EARLY REMISSION (HCC): ICD-10-CM

## 2023-05-26 DIAGNOSIS — F31.4 BIPOLAR I DISORDER, SEVERE, CURRENT OR MOST RECENT EPISODE DEPRESSED, WITH MIXED FEATURES (HCC): Primary | ICD-10-CM

## 2023-05-26 DIAGNOSIS — F41.9 ANXIETY: ICD-10-CM

## 2023-05-26 NOTE — PROGRESS NOTES
Subjective:     Patient ID: Vikram Turner is a 47 y o  female  [facility] Clinical Progress Notes      Specialized Services Documentation  Therapist must complete separate progress note for each specific clinical activity in which the individual participated during the day  [facility] ([id number]) Voicemail was left for [employer] from Brittnee Carl a contact number [phone] ext  [id number], using Tax ID [id number]  [facility] location address of [address] [place], [place] with a billing address [address] [place] [address] a call back to make sure fax was received and authorization was extended  [facility] ([id number]) Voicemail was left for [employer] from Brittnee Carl a contact number [phone] ext  [id number], using Tax ID [id number]  [facility] location address of [address] [place], [place] with a billing address [address] [facility] 3 days from 5/9 /23 to 5/30/23  Case Management Note    CATHY Hodge    Current suicide risk : Unable to assess due to absence  Vikram Turner was excused for program today 05/26/23  CM contacted Vikram Turner at [phone]  Informed that message was received that she would be attending work  Was informed that discharge meeting was scheduled for 1030 still and if she was not able to make it to this discharge meeting it would be marked that she no call no showed for her discharge meeting  She was informed that discharge instructions were located on her mychart incase she was unable to attend  Asked if she would like an JAKE completed for her new Therapist Turner Lopez to please let CM know  Email was sent relaying this information as well  ([phone]) CM waited on TEAMS for 10 minutes  Naveed Quiroz was a ncns to her discharge meeting  Unable to complete Relapse Prevention Plan,review discharge instructions, medication list and crisis information with Vikram Turner    See discharge summary for treatment details  Aftercare providers to receive discharge summary  Medications changes/added/denied? No    Treatment session number: NA    Individual Case Management Visit provided today?  Yes     [facility] follow up physician's orders: Discharge - See Dr Daryle Lambert Note

## 2023-05-26 NOTE — PROGRESS NOTES
"Assessment/Plan:       Diagnoses and all orders for this visit:     Bipolar I disorder, severe, current or most recent episode depressed, with mixed features ([facility] )     Alcohol use disorder, severe, in early remission ([facility] )     Anxiety            Subjective:      Patient ID: Krystal Gruber is a 47 y  o  female      [facility] Treatment Plan   AREAS OF NEED: Bipolar 1 disorder,severe, current or most recent episode depressed with mixed features  Alcohol use disorder severe in early remission and anxiety as evidenced by racing and ruminating thoughts, being fidgety, decrease interest, decrease in concentration , worrying, lack of energy - just wanting to stay in bed all day, decrease in hopelessness, and recent alcohol abuse due to relapse in alcohol use    Date Initiated: 05/09/23     Strengths: \"determined to get better, good mom and good wife  \"     LONG TERM GOAL:   Date Initiated: 05/09/23  1 0 I will identify and share two ways I feel relief of anxiety symptoms and improvement in ability to function independently  Target Date: 6/6/23  Completion Date: 5/26/23-discharge         SHORT TERM OBJECTIVES:      Date Initiated: 05/09/23  1 1 I will identify emotions at least once a day and will implement an appropriate coping skill that will help regulate that emotion     Revision Date: 05/19/23 due to absence  Target Date: 5/18/23  Completion Date: 5/26/23-discharge     Date Initiated: 05/09/23  1 2 I will share examples of how I am practicing acceptance and describe the challenges I faced in doing so    Revision Date: 05/19/23 due to absence  Target Date: 5/18/23  Completion Date:5/26/23-discharge     Date Initiated: 05/09/23  1 3 I will take medications as prescribed and share questions and concerns if arise     Revision Date:05/19/23 due to absence  Target Date: 5/18/23  Completion Date: 5/26/23-discharge     Date Initiated: 05/09/23  1 4 I will identify 3 ways my supports can assist in my recovery and agree to " staff/support contact as indicated     Revision Date:05/19/23 due to absence  Target Date: 5/18/23  Completion Date:5/26/23-discharge            7 DAY REVISION:  1 5 I will use the thought record sheet to work through one cognitive distortion each day to change the negative thoughts to positive thoughts     Date Initiated: 05/19/23   Revision Date: 5/26/23-discharge  Target Date: 05/31/23  Completion Date:5/26/23-discharge        PSYCHIATRY:  Date Initiated:  05/09/23  Medication Management and Education       Revision Date: 05/19/23 ; 5/26/23-discharge       1 3 Continue medication management      The person(s) responsible for carrying out the plan is Bennie De La Rosa DO     NURSING/SYMPTOM EDUCATION:   Date Initiated: 05/09/23  1 1, 1 2  1 3, 1 4 This RN/Or Therapist will provide wellness/symptoms and skill education groups three to five days weekly to Mari Irizarry signs and symptoms of diagnoses, skills to manage, and medication questions that will be addressed by the treatment team     Revision date: 05/19/23 ; 5/26/23-discharge       1 1,1 2,1 3,1 4,1 5 Continue to encourage Radha Park to participate in wellness groups daily to learn about symptoms, coping strategies and warning signs to promote relapse prevention  The person(s) responsible for carrying out the plan is CATHY Wang, LSW ; Hilda Carl RN, BSN     PSYCHOLOGY:   Date Initiated: 05/09/23       1 1, 1 2, 1 4 Provide psychotherapy group 5 times per week to allow opportunity for Matt Valenzuela explore stressors and ways of coping  Revision Date: 05/19/23 ; 5/26/23-discharge  1 1,1 2,1 4,1 5  Continue to provide psychotherapy group daily to Radha Park and encourage sharing of stressors, skills and positive change     The person(s) responsible for carrying out the plan is CATHY Wang,HILARIAW; Jayesh Koch     ALLIED THERAPY:   Date Initiated: 05/09/23  1 1,1 2 Engage Krystal Gruber in AT group 5 times weekly to encourage development and use of wellness tools to decrease symptoms and promote recovery through meaningful activity  Revision Date: 05/19/23 ; 5/26/23-discharge  1 1,1 2,1 5 Continue to engage Francisco Javier Hayes to participate in AT group to practice wellness tools within program and transfer to home sharing successes and barriers through healthy task involvement        The person(s) responsible for carrying out the plan is KAYLIN Parekh ; KAYLIN Tariq     CASE MANAGEMENT:   Date Initiated: 05/09/23      1 0 This  will meet with John Gruber  3-4 times weekly to assess treatment progress, discharge planning, connection to community supports and UR as indicated  Revision Date: 05/19/23 ; 5/26/23-discharge  1 0 Continue to meet with Francisco Javier Hayes 3-4 times weekly to assess growth, work toward goals, continued treatment needs, dc planning and use of supports  The person(s) responsible for carrying out the plan is Seamus Vallecillo     TREATMENT REVIEW/COMMENTS:      DISCHARGE CRITERIA: Identify 3 signs of progress and complete relapse prevention plan     DISCHARGE PLAN: Connect with identified outpatient providers  Estimated Length of Stay: 10 treatment days          Diagnosis and Treatment Plan explained to Krystal Rice relates understanding diagnosis and is agreeable to Treatment Plan          CLIENT COMMENTS / Please share your thoughts, feelings, need and/or experiences regarding your treatment plan with Staff  Karl Vega see follow up note with comments      Signatures can be found on [facility] Treatment plan consent form

## 2023-05-26 NOTE — PROGRESS NOTES
"  Subjective:     Patient ID: Danika Raimrez is a 47 y o  female  [facility] Discharge Summary:   Admission Date: 5/9/23  Patient was referred by  [place] [address] [facility]   Discharge Date: 05/26/23   Was this a routine discharge? yes   Diagnosis: Axis I:   1  Bipolar I disorder, severe, current or most recent episode depressed, with mixed features ([facility] [address] )        2  Alcohol use disorder, severe, in early remission ([facility] [address] )        3  Anxiety           Treating Physician: Marcy Silveira    Treatment Complications: Jie Rodriguez called off multiple times during the course of program so she could go to work    Presenting Need:   HPI:         Pre-morbid level of function and History of Present Illness:   As per Dr Coreen Morrison:   Patient is aware this is a billable service  HPI      Cady Miguel Gruber is a 47 y  o  female with past psychiatric history of bipolar disorder and anxiety is admitted to [facility] referred by [facility] inpatient unit      Isabel Nathan MIAN Gruber reports she is struggling with anxiety  Negra Every states she uses alcohol to cope, but has been sober since 5/1  She states she was hospitalized and discharged on Friday, but states they did not change her medications  Negra Every states she does not feel her medications are working well for her, as she is still feeling anxious  Negra Every states she would use alcohol to cope with the anxiety, but has not had a drink since she went to the ER on 5/1  States she will get racing thoughts and feel anxious, \"it's hard to explain\"   States she feels she sometimes \"makes stuff up\" to worry about, but states things are good at home  [facility] any history of trauma     States in March her Wellbutrin was discontinued, which she states has made her feel worse   States she was put on Lurasidone, but hasn't been taking with food   States she does sleep well   She admits that she does have a history of bipolar disorder, and has had episodes of kenji in the past  Negra Every states that she had many mood swings when she was a " "teenager, but things began to get worse over the years  [facility] feels that she has used alcohol to cope with her mental health over the years  She admits she is well connected to [facility], and already attended on this morning   States she started drinking excessively after her children were born  Juan Luis Falling she was using alcohol to cope with anxiety and depression   Went to rehab when 28years old, and stayed sober for several months  Sheyla Allison states her longest point of sobriety was 2 years, but she was never an everyday drinker   Denies any history of withdrawals or delirium tremens   States she feels her unstable mental health also really caused more drinking too   Started seeing [address] in 2008 after birth of her son  Sheyla Allison states that she has been on Depakote for many years, and has had other trials of medications as well   She states that she believes she was on Lexapro in the past, and we confirmed this when she was in her last partial hospitalization program here in 2020   She states that she was sober for 8 months after that  States her  is very supportive   She states that part of the reason she stayed sober through this past weekend was because of her family and their support  Sheyla Allison denies thoughts to hurt himself or anyone else, denies any hallucinations  Sheyla Allison states that she is \"done \"with drinking, and she is hopeful that this program will provide her more coping skills   She states that it worked well for her in the past   Psychosocial Stressors include alcohol relapse recently          As per this writer: Sheela Blanton is a 47 y o  female using she/her pronouns referred to [facility] via [facility] due to history of bipolar disorder and anxiety  Beth Soto reports that she started \"not thinking right and was not coping well\"   She reports struggling with anxiety and using alcohol to cope  She reports occasionally binge drinking    She shared that she is unable to tolerate alcohol anymore therefore " "she started drinking Vanilla Extra up to 6 bottles at a time  The longest Moseschristina Ingram was sober for the past few decades was 2 years   She attended [facility] in 2020 and was sober for 8 months after that  Moseschristina Ingram does not feel like her medication is working well because she still feels anxious  She reports not having a drink since 5/1/23  She shared that she does not know what the trigger is but she feels like \" every morning I feel like I will jump out of my skin  \" Moseschristina Ingram reports that everything is calm at home and there is no stress but she feels like she is \"climbing a wall and can't calm myself down  \" Moseschristina Ingram reports that she has racing and ruminating thoughts such as \" I have ruined everyone life  \" and \" I was never there for my kids  \" Moses Ingram shared that sometimes she feels like she \"makes thing up \" to worry about  Krystal's symptoms include racing and ruminating thoughts, being fidgety, decrease interest, decrease in concentration , worrying, lack of energy - just wanting to stay in bed all day, decrease in hopelessness, and recent alcohol abuse  Denies SI, SIB, and HI      As per Yayo Pantoja: \"climbing a wall and can't calm myself down  \"       Course of treatment includes:    group counseling, medication management, individual case management, allied therapy, psychoeducation, and psychiatric evaluation    Treatment Progress: Yayo Pantoja attended 8 PHP days in which Moses Ingram was encouraged to challenge negative thoughts, engaging in healthy coping strategies and learned ways to manage her symptoms  Moseschristina Ingram was an active participant in program and in individual work when she attended   Moseschristina SibleyNataly actively worked on suggestions and practiced coping skills  Moseschristina Ingram was more aware of her distorted thinking  Moseschristina Ingram was open to learning CBT,DBT and ACT skills  Moseschristina Ingram was able to identify personal issues and able to problem solve options  Moseschristina Ingram shared improvement through being able to reframe her thoughts    Moseschristina Ingram identified an increase in " hopefulness  Krystal's PHQ-9 was a 17 upon the start of the program  Andria Rosado did not attend the discharge meeting due to calling off of program so she could attend work  PHQ-9 and relapse prevention plan were not completed at the time of discharge due to not attending  Aftercare providers to receive summary  Aftercare recommendations include:    Follow-up appointments/Referrals:   Current Psychiatrist/Therapist:   Medication Management:   Obie Osorio  [address]   [place], [address] [place]  [phone]  Follow up:  [facility] called to set up outpatient follow up appointment and left a voicemail requesting a return phone call      Outpatient Therapy:   [facility] Eli[facility]  [facility]   [place], [address]  [phone]  Follow up: 6/7/23 at [facility] Physician:   Kavita Allison - [facility]   [address] [address]  [place], [address]  [phone]       Discharge Medications include:  Current Outpatient Medications:   •  buPROPion (Wellbutrin XL) 300 mg 24 hr tablet, Take 1 tablet (300 mg total) by mouth daily, Disp: , Rfl:   •  cloNIDine (Catapres) 0 1 mg tablet, Take 1 tablet (0 1 mg total) by mouth 2 (two) times a day as needed (anxiety), Disp: 60 tablet, Rfl: 1  •  divalproex sodium (DEPAKOTE ER) 250 mg 24 hr tablet, take 1 tablet by mouth at bedtime WITH 500MG TABS, Disp: , Rfl:   •  divalproex sodium (DEPAKOTE ER) 500 mg 24 hr tablet, Take 1,750 mg by mouth daily at bedtime, Disp: , Rfl:   •  gabapentin (Neurontin) 300 mg capsule, Take 1 capsule (300 mg total) by mouth 3 (three) times a day, Disp: 90 capsule, Rfl: 1  •  ibuprofen (MOTRIN) 200 mg tablet, Take by mouth every 6 (six) hours as needed for mild pain, Disp: , Rfl:   •  lurasidone (Latuda) 20 mg tablet, Take 20 mg by mouth daily with breakfast, Disp: , Rfl:   •  Multiple Vitamin (multivitamin) capsule, Take 1 capsule by mouth daily, Disp: , Rfl:

## 2023-05-26 NOTE — TELEPHONE ENCOUNTER
Patient is being discharged from [facility] today 05/26/2023, left message for the patient to call the office to schedule a follow up with Dr BLAKELY

## 2023-05-30 ENCOUNTER — APPOINTMENT (OUTPATIENT)
Dept: PSYCHOLOGY | Facility: CLINIC | Age: 53
End: 2023-05-30
Payer: COMMERCIAL

## 2023-05-31 ENCOUNTER — APPOINTMENT (OUTPATIENT)
Dept: PSYCHOLOGY | Facility: CLINIC | Age: 53
End: 2023-05-31
Payer: COMMERCIAL

## 2023-06-19 ENCOUNTER — OFFICE VISIT (OUTPATIENT)
Dept: PSYCHIATRY | Facility: CLINIC | Age: 53
End: 2023-06-19
Payer: COMMERCIAL

## 2023-06-19 DIAGNOSIS — F41.9 ANXIETY: ICD-10-CM

## 2023-06-19 DIAGNOSIS — F31.4 BIPOLAR I DISORDER, SEVERE, CURRENT OR MOST RECENT EPISODE DEPRESSED, WITH MIXED FEATURES (HCC): Primary | ICD-10-CM

## 2023-06-19 DIAGNOSIS — F10.21 ALCOHOL USE DISORDER, SEVERE, IN EARLY REMISSION (HCC): ICD-10-CM

## 2023-06-19 PROCEDURE — 99214 OFFICE O/P EST MOD 30 MIN: CPT | Performed by: STUDENT IN AN ORGANIZED HEALTH CARE EDUCATION/TRAINING PROGRAM

## 2023-06-19 RX ORDER — DIVALPROEX SODIUM 500 MG/1
1500 TABLET, EXTENDED RELEASE ORAL
Qty: 270 TABLET | Refills: 2 | Status: SHIPPED | OUTPATIENT
Start: 2023-06-19

## 2023-06-19 RX ORDER — BUPROPION HYDROCHLORIDE 150 MG/1
150 TABLET ORAL DAILY
Qty: 30 TABLET | Refills: 1 | Status: SHIPPED | OUTPATIENT
Start: 2023-06-19

## 2023-06-19 RX ORDER — BUPROPION HYDROCHLORIDE 300 MG/1
300 TABLET ORAL DAILY
Qty: 90 TABLET | Refills: 1 | Status: SHIPPED | OUTPATIENT
Start: 2023-06-19

## 2023-06-19 RX ORDER — DIVALPROEX SODIUM 250 MG/1
250 TABLET, EXTENDED RELEASE ORAL
Qty: 90 TABLET | Refills: 2 | Status: SHIPPED | OUTPATIENT
Start: 2023-06-19

## 2023-06-20 NOTE — PSYCH
"MEDICATION MANAGEMENT NOTE        [facility]      Name and Date of Birth: Wanda Delatorre 47 y o  1970 MRN: [id number]    Date of Visit: June 19, 2023    Visit Time    Visit Start Time: 1430  Visit Stop Time: 0147  Total Visit Duration: 25 minutes      Reason for Visit: Follow-up visit regarding medication management     Chief Complaint: \"I feel sort of flat  \"    SUBJECTIVE:    Wanda Delatorre is a 47 y o , female, possessing a past psychiatric history significant for bipolar depression, anxiety, and alcohol use disorder, who was personally seen and evaluated at the [address] [place] [address] outpatient clinic for follow-up regarding medication management  She is currently prescribed Depakote 750 mg nightly, Wellbutrin  mg daily, clonidine 0 1 mg twice daily as needed anxiety, and gabapentin 300 mg 3 times daily  She was also prescribed Latuda 20 mg daily  During interview today, Madhuri Meneses states that since their previous outpatient psychiatric appointment, she has noticed an improvement in her anxiety  However, she states she is still feeling depressed  She states that she feels \"flat \", and disinterested in things around her  She concedes that she did discontinue the [place] that she had been previously prescribed  She states that she has never done well on other antipsychotic trials, including Seroquel which caused excessive sedation, and Abilify which caused akathisia  She states that this anhedonia makes people ask if she is feeling depressed, and she admits that she does not spend as much time doing things she used to enjoy  She states that she is grateful her anxiety is improved, but she states \"I just still feel down \"  She states she is sleeping well, and denies any relapses on alcohol  She states that she has been going to [facility] daily, and finds that this is helpful with keeping her schedule    She states that her children have been " supportive, she is looking forward to several trips she has planned the summer  She denies thoughts to herself or anyone else  She states that she did find the groups helpful, and has been working on her DBT skills  Current Rating Scores:   None completed today  Psychiatric Review Of Systems:    Appetite: no change  Adverse eating: no  Weight changes: no change  Insomnia/sleeplessness: no  Fatigue/anergy: yes  Anhedonia/lack of interest: yes  Attention/concentration: no change  Psychomotor agitation/retardation: yes, sluggish  Somatic symptoms: no  Anxiety/panic attack: no  Rachel/hypomania: no  Hopelessness/helplessness/worthlessness: no  Self-injurious behavior/high-risk behavior: no  Suicidal ideation: no  Homicidal ideation: no  Auditory hallucinations: no  Visual hallucinations: no  Other perceptual disturbances: no  Delusional thinking: no  Obsessive/compulsive symptoms: no    Review Of Systems:      Constitutional negative   ENT negative   Cardiovascular negative   Respiratory negative   Gastrointestinal negative   Genitourinary negative   Musculoskeletal negative   Integumentary negative   Neurological negative   Endocrine negative   Other Symptoms none, all other systems are negative     History Review: The following portions of the patient's history were reviewed and updated as appropriate: allergies, current medications, past family history, past medical history, past social history, past surgical history and problem list          OBJECTIVE:     Vital signs in last 24 hours: There were no vitals filed for this visit      Mental Status Evaluation:    Appearance age appropriate, casually dressed   Behavior cooperative, less anxious   Speech normal rate, normal volume, normal pitch   Mood improved, continues to report dysphoric   Affect normal range and intensity, appropriate   Thought Processes organized, goal directed   Associations intact associations   Thought Content no overt delusions Perceptual Disturbances: no auditory hallucinations, no visual hallucinations   Abnormal Thoughts  Risk Potential Suicidal ideation - None  Homicidal ideation - None  Potential for aggression - No   Orientation oriented to person, place, time/date and situation   Memory recent and remote memory grossly intact   Consciousness alert and awake   Attention Span Concentration Span attention span and concentration are age appropriate   Intellect appears to be of average intelligence   Insight intact   Judgement intact   Muscle Strength and  Gait normal muscle strength and normal muscle tone, normal gait and normal balance   Motor activity no abnormal movements   Fund of Knowledge adequate knowledge of current events  adequate fund of knowledge regarding past history  adequate fund of knowledge regarding vocabulary    Pain none   Pain Scale 0       Laboratory Results: I have personally reviewed all pertinent laboratory/tests results    Recent Labs (last 2 months):   No visits with results within 2 Month(s) from this visit  Latest known visit with results is:   Office Visit on 01/30/2022   Component Date Value   • SARS-CoV-2 01/30/2022 Negative    • INFLUENZA A PCR 01/30/2022 Negative    • INFLUENZA B PCR 01/30/2022 Negative        Suicide/Homicide Risk Assessment:    The following interventions are recommended: contracts for safety at present - agrees to go to ED if feeling unsafe, contracts for safety at present - agrees to call Crisis Intervention Service if feeling unsafe  Although patient's acute lethality risk is low, long-term/chronic lethality risk is mildly elevated in the presence of depression  At the current moment, Patricia Valenzuela is future-oriented, forward-thinking, and demonstrates ability to act in a self-preserving manner as evidenced by volitionally presenting to the clinic today, seeking treatment   To mitigate future risk, patient should adhere to the recommendations below, avoid alcohol/illicit substance use, utilize community-based resources and familiar support and prioritize mental health treatment  Presently, patient denies active suicidal/homicidal ideation in addition to thoughts of self-injury; contracts for safety  At conclusion of evaluation, patient is amenable to the recommendations below  Patient is amenable to calling/contacting the outpatient office including this writer if any acute adverse effects of their medication regimen arise in addition to any comments or concerns pertaining to their psychiatric management  Patient is amenable to calling/contacting crisis and/or attending to the nearest emergency department if their clinical condition deteriorates to assure their safety and stability, stating that they are able to appropriately confide in their  regarding their psychiatric state  Assessment/Plan:     Diagnoses and all orders for this visit:    Bipolar I disorder, severe, current or most recent episode depressed, with mixed features (HCC)  -     buPROPion (Wellbutrin XL) 150 mg 24 hr tablet; Take 1 tablet (150 mg total) by mouth daily Take with 300mg daily for total of 450mg daily  -     divalproex sodium (DEPAKOTE ER) 500 mg 24 hr tablet; Take 3 tablets (1,500 mg total) by mouth daily at bedtime Take with 250mg for total of 1750mg   -     divalproex sodium (DEPAKOTE ER) 250 mg 24 hr tablet; Take 1 tablet (250 mg total) by mouth daily at bedtime Take with 1500mg for total of 1750mg at bedtime  Anxiety  -     buPROPion (Wellbutrin XL) 150 mg 24 hr tablet; Take 1 tablet (150 mg total) by mouth daily Take with 300mg daily for total of 450mg daily  -     buPROPion (Wellbutrin XL) 300 mg 24 hr tablet; Take 1 tablet (300 mg total) by mouth daily Take with 150mg for total of 450mg daily  Alcohol use disorder, severe, in early remission ([facility] [address] )    Giuliano Beck is a 50-year-old female has been struggling with worsening anxiety and depression    She seems to be doing well since the partial hospitalization program, and working on DBT skills to manage her anxiety  Her anxiety has been more concerning in the past, after she had relapsed on alcohol due to her anxious distress  She seems very motivated to maintain her sobriety, with significant enrollment in [facility]  Hopefully, increasing her antidepressant will help with some of her lingering anhedonia  Treatment Recommendations/Precautions:    • Increase Wellbutrin to 450mg XL treatment of her depressive symptoms  We will continue with Depakote 750 mg nightly for mood stabilization  • For treatment of her anxiety, continue with gabapentin 300 mg 3 times daily, and patient has clonidine 0 1 mg twice daily as needed anxiety  • D/c Latuda due to ineffectiveness and side effects  • Aware of 24 hour and weekend coverage for urgent situations accessed by calling [address] [facility] [address] main practice number  Patient advised to call 911 if feeling suicidal or homicidal before acting out on their thoughts and they expressed understanding  Medications Risks/Benefits      Risks, Benefits And Possible Side Effects Of Medications:    Risks, benefits, and possible side effects of medications explained to Nola Rutledge and she verbalizes understanding and agreement for treatment  including: Risks and potential side effects of medication discussed with patient including serotonin syndrome, SIADH, worsening depression, suicidality, induction of kenji, GI upset, headaches, activation, sexual side effects, sedation, potential drug interactions, and others  Patient expressed understanding           Controlled Medication Discussion:     Not applicable    Psychotherapy Provided:     Individual psychotherapy provided: No     Treatment Plan:    Completed and signed during the session: Not applicable - Treatment Plan not due at this session    This note was not shared with the patient due to this is a psychotherapy note      Lisa Silver DO 06/20/23

## 2023-06-26 ENCOUNTER — DOCUMENTATION (OUTPATIENT)
Dept: BEHAVIORAL/MENTAL HEALTH CLINIC | Facility: CLINIC | Age: 53
End: 2023-06-26

## 2023-06-26 NOTE — PROGRESS NOTES
Kirti Domitila had an initial therapy appt scheduled for today, 6/26 at 4:00PM   She cancelled this appt, chose not to reschedule at this time  States she will call

## 2023-07-19 ENCOUNTER — OFFICE VISIT (OUTPATIENT)
Dept: PSYCHIATRY | Facility: CLINIC | Age: 53
End: 2023-07-19
Payer: COMMERCIAL

## 2023-07-19 DIAGNOSIS — F31.4 BIPOLAR I DISORDER, SEVERE, CURRENT OR MOST RECENT EPISODE DEPRESSED, WITH MIXED FEATURES (HCC): ICD-10-CM

## 2023-07-19 DIAGNOSIS — F41.9 ANXIETY: Primary | ICD-10-CM

## 2023-07-19 PROCEDURE — 99215 OFFICE O/P EST HI 40 MIN: CPT | Performed by: STUDENT IN AN ORGANIZED HEALTH CARE EDUCATION/TRAINING PROGRAM

## 2023-07-19 RX ORDER — LITHIUM CARBONATE 300 MG/1
300 TABLET, FILM COATED, EXTENDED RELEASE ORAL DAILY
Qty: 30 TABLET | Refills: 1 | Status: SHIPPED | OUTPATIENT
Start: 2023-07-19

## 2023-07-25 NOTE — PSYCH
MEDICATION MANAGEMENT NOTE        [facility]      Name and Date of Birth: Ashu Varela 47 y.o. 1970 MRN: [id number]    Date of Visit: July 19, 2023    Visit Time    Visit Start Time: 1430  Visit Stop Time: 2738  Total Visit Duration: 25 minutes      Reason for Visit: Follow-up visit regarding medication management     Chief Complaint: "I feel like I'm still down ."    SUBJECTIVE:    Ashu Varela is a 47 y.o., female, possessing a past psychiatric history significant for bipolar depression, anxiety, and alcohol use disorder, who was personally seen and evaluated at the [address] [place] [address] outpatient clinic for follow-up regarding medication management. She is currently prescribed Depakote 750 mg nightly, Wellbutrin  mg daily, clonidine 0.1 mg twice daily as needed anxiety, and gabapentin 300 mg 3 times daily. During interview today, Valerie Santoyo states that since their previous outpatient psychiatric appointment, she has not had an improvement in her depression since she increased the Wellbutrin to 450mg XL. States she had worsening headaches, and has gone back to 300mg. States she feels that even when she was on vacation, she was still "flat" and did not feel she was enjoying things as much as she should. She states that she does feel her anxiety is improving, denies any side effects from the gabapentin. She states she is eating and sleeping well, and has tried to exercise, but "I still just don't feel right". She denies thoughts to hurt herself or anyone else. She states she has not had any cravings for alcohol, and has continued following up with AA. She states overall, she would be interested in trialing something else for her depression. She states she does not feel the depakote is what is causing her worsening depression or is overly sedating. Current Rating Scores:   None completed today.     Psychiatric Review Of Systems:    Appetite: no change  Adverse eating: no  Weight changes: no change  Insomnia/sleeplessness: no  Fatigue/anergy: yes  Anhedonia/lack of interest: yes  Attention/concentration: no change  Psychomotor agitation/retardation: yes, sluggish  Somatic symptoms: no  Anxiety/panic attack: no  Rachel/hypomania: no  Hopelessness/helplessness/worthlessness: no  Self-injurious behavior/high-risk behavior: no  Suicidal ideation: no  Homicidal ideation: no  Auditory hallucinations: no  Visual hallucinations: no  Other perceptual disturbances: no  Delusional thinking: no  Obsessive/compulsive symptoms: no    Review Of Systems:      Constitutional negative   ENT negative   Cardiovascular negative   Respiratory negative   Gastrointestinal negative   Genitourinary negative   Musculoskeletal negative   Integumentary negative   Neurological negative   Endocrine negative   Other Symptoms none, all other systems are negative     History Review: The following portions of the patient's history were reviewed and updated as appropriate: allergies, current medications, past family history, past medical history, past social history, past surgical history and problem list..         OBJECTIVE:     Vital signs in last 24 hours: There were no vitals filed for this visit.     Mental Status Evaluation:    Appearance age appropriate, casually dressed   Behavior cooperative, less anxious   Speech normal rate, normal volume, normal pitch   Mood improved, continues to report dysphoric   Affect normal range and intensity, appropriate   Thought Processes organized, goal directed   Associations intact associations   Thought Content no overt delusions   Perceptual Disturbances: no auditory hallucinations, no visual hallucinations   Abnormal Thoughts  Risk Potential Suicidal ideation - None  Homicidal ideation - None  Potential for aggression - No   Orientation oriented to person, place, time/date and situation   Memory recent and remote memory grossly intact   Consciousness alert and awake   Attention Span Concentration Span attention span and concentration are age appropriate   Intellect appears to be of average intelligence   Insight intact   Judgement intact   Muscle Strength and  Gait normal muscle strength and normal muscle tone, normal gait and normal balance   Motor activity no abnormal movements   Fund of Knowledge adequate knowledge of current events  adequate fund of knowledge regarding past history  adequate fund of knowledge regarding vocabulary    Pain none   Pain Scale 0       Laboratory Results: I have personally reviewed all pertinent laboratory/tests results    Recent Labs (last 2 months):   No visits with results within 2 Month(s) from this visit. Latest known visit with results is:   Office Visit on 01/30/2022   Component Date Value   • SARS-CoV-2 01/30/2022 Negative    • INFLUENZA A PCR 01/30/2022 Negative    • INFLUENZA B PCR 01/30/2022 Negative        Suicide/Homicide Risk Assessment:    The following interventions are recommended: contracts for safety at present - agrees to go to ED if feeling unsafe, contracts for safety at present - agrees to call Crisis Intervention Service if feeling unsafe. Although patient's acute lethality risk is low, long-term/chronic lethality risk is mildly elevated in the presence of depression. At the current moment, Gearld Guardian is future-oriented, forward-thinking, and demonstrates ability to act in a self-preserving manner as evidenced by volitionally presenting to the clinic today, seeking treatment. To mitigate future risk, patient should adhere to the recommendations below, avoid alcohol/illicit substance use, utilize community-based resources and familiar support and prioritize mental health treatment. Presently, patient denies active suicidal/homicidal ideation in addition to thoughts of self-injury; contracts for safety. At conclusion of evaluation, patient is amenable to the recommendations below. Patient is amenable to calling/contacting the outpatient office including this writer if any acute adverse effects of their medication regimen arise in addition to any comments or concerns pertaining to their psychiatric management. Patient is amenable to calling/contacting crisis and/or attending to the nearest emergency department if their clinical condition deteriorates to assure their safety and stability, stating that they are able to appropriately confide in their  regarding their psychiatric state. Assessment/Plan:     Diagnoses and all orders for this visit:    Anxiety    Bipolar I disorder, severe, current or most recent episode depressed, with mixed features (HCC)  -     lithium carbonate (LITHOBID) 300 mg CR tablet; Take 1 tablet (300 mg total) by mouth daily  -     Valproic acid level, total; Future  -     Comprehensive metabolic panel; Future  -     Lithium level; Future    Kashmir Rosanna is a 64-year-old female has been struggling with worsening anxiety and depression. She seems to be doing well since the partial hospitalization program, and working on DBT skills to manage her anxiety. Her anxiety has been more concerning in the past, after she had relapsed on alcohol due to her anxious distress. She seems very motivated to maintain her sobriety, with significant enrollment in [employer]. Hopefully, starting lithium will help her symptoms. Treatment Recommendations/Precautions:    • For her bipolar depression, will start lithium 300mg qHS for treatment of her symptoms and to augment her depakote. Will order a lithium level in five days, and also TSH. • Continue Wellbutrin 300mg XL treatment of her depressive symptoms. • We will continue with Depakote 750 mg nightly for mood stabilization. • For treatment of her anxiety, continue with gabapentin 300 mg 3 times daily, and patient has clonidine 0.1 mg twice daily as needed anxiety.   • Aware of 24 hour and weekend coverage for urgent situations accessed by calling [facility] practice number  Patient advised to call 911 if feeling suicidal or homicidal before acting out on their thoughts and they expressed understanding. Medications Risks/Benefits      Risks, Benefits And Possible Side Effects Of Medications:    Risks, benefits, and possible side effects of medications explained to Jonas Carlin and she verbalizes understanding and agreement for treatment. including: Risks and potential side effects of medication discussed with patient including serotonin syndrome, SIADH, worsening depression, suicidality, induction of kenji, GI upset, headaches, activation, sexual side effects, sedation, potential drug interactions, and others. Patient expressed understanding. Medication risks with lithium including weight gain, headaches, renal and thyroid risks, interactions with various medications such as anti-inflammatories and blood pressure medications, lithium toxicity, GI upset, tremors, need for lab monitoring, and others, teratogenicity for female patients      .      Controlled Medication Discussion:     Not applicable    Psychotherapy Provided:     Individual psychotherapy provided: No     Treatment Plan:    Completed and signed during the session: Not applicable - Treatment Plan not due at this session    This note was not shared with the patient due to this is a psychotherapy note      Laz Flores DO 07/24/23

## 2023-08-09 ENCOUNTER — TELEPHONE (OUTPATIENT)
Dept: PSYCHIATRY | Facility: CLINIC | Age: 53
End: 2023-08-09

## 2023-08-09 DIAGNOSIS — F41.9 ANXIETY: Primary | ICD-10-CM

## 2023-08-09 RX ORDER — FLUOXETINE 10 MG/1
10 CAPSULE ORAL DAILY
Qty: 30 CAPSULE | Refills: 1 | Status: SHIPPED | OUTPATIENT
Start: 2023-08-09

## 2023-08-09 NOTE — TELEPHONE ENCOUNTER
Dearborn Law is interested in having the ECT treatments. She stopped taking the lithium because of severe headaches. She would royce to know what are the next steps for treatment, does it need to be prior authed?

## 2023-08-09 NOTE — TELEPHONE ENCOUNTER
Called and spoke with Max Ansariashley regarding her symptoms. She states she was unable to tolerate the lithium, because tremors and increased headaches. States that she is still feeling depressed, and would like to explore the option of ECT. We discussed the risks and benefits of inpatient versus outpatient, and she states that she would prefer to do outpatient ECT. I advised that this may take some time to get in for an outpatient evaluation and appointment, but I let her know that I would send in the referral.  We also discussed other options for depression and bipolar disorder, including augmentation with an SSRI medication. She has been unable to tolerate higher doses of bupropion. We reviewed her chart, which did document that she had tried Lexapro in the past.  Unable to find a reason why she was discontinued on this medication, but decided to try Prozac instead. Will start Prozac 10 mg daily for treatment of her bipolar depression, advised to monitor closely for any worsening kenji or hypomanic symptoms. She states that her  is well aware of her symptoms and that she will be starting the medication, and she will make him aware. She denies thoughts to herself or anyone else at this time, denies any acute safety concerns, and agrees that she does want to continue with an ECT referral.  Will follow-up next week.

## 2023-08-17 ENCOUNTER — TELEPHONE (OUTPATIENT)
Dept: PSYCHIATRY | Facility: CLINIC | Age: 53
End: 2023-08-17

## 2023-08-17 NOTE — TELEPHONE ENCOUNTER
Called Alexandra to check if a Prior Authorization is required for outpatient ECT treatment. Spoke to Brandin and was advised that a PA is not required. She provided me with a call reference number of [id number].

## 2023-08-24 NOTE — TELEPHONE ENCOUNTER
LM on Krystal's VM informing her that she will need to get medical clearance from her PCP prior to getting any ECT treatment.

## 2023-09-26 ENCOUNTER — OFFICE VISIT (OUTPATIENT)
Dept: PSYCHIATRY | Facility: CLINIC | Age: 53
End: 2023-09-26
Payer: COMMERCIAL

## 2023-09-26 DIAGNOSIS — F41.9 ANXIETY: ICD-10-CM

## 2023-09-26 DIAGNOSIS — F31.4 BIPOLAR I DISORDER, SEVERE, CURRENT OR MOST RECENT EPISODE DEPRESSED, WITH MIXED FEATURES (HCC): Primary | ICD-10-CM

## 2023-09-26 PROCEDURE — 99214 OFFICE O/P EST MOD 30 MIN: CPT | Performed by: STUDENT IN AN ORGANIZED HEALTH CARE EDUCATION/TRAINING PROGRAM

## 2023-09-26 RX ORDER — FLUOXETINE HYDROCHLORIDE 20 MG/1
20 CAPSULE ORAL DAILY
Qty: 30 CAPSULE | Refills: 1 | Status: SHIPPED | OUTPATIENT
Start: 2023-09-26

## 2023-09-26 RX ORDER — DIVALPROEX SODIUM 500 MG/1
1500 TABLET, EXTENDED RELEASE ORAL
Qty: 270 TABLET | Refills: 2 | Status: SHIPPED | OUTPATIENT
Start: 2023-09-26

## 2023-09-26 RX ORDER — DIVALPROEX SODIUM 250 MG/1
250 TABLET, EXTENDED RELEASE ORAL
Qty: 90 TABLET | Refills: 2 | Status: SHIPPED | OUTPATIENT
Start: 2023-09-26

## 2023-09-26 RX ORDER — BUPROPION HYDROCHLORIDE 300 MG/1
300 TABLET ORAL DAILY
Qty: 90 TABLET | Refills: 1 | Status: SHIPPED | OUTPATIENT
Start: 2023-09-26

## 2023-09-27 NOTE — PSYCH
MEDICATION MANAGEMENT NOTE        [facility]      Name and Date of Birth: Gely Jacobs 47 y.o. 1970 MRN: [id number]    Date of Visit: September 26, 2023    Visit Time    Visit Start Time: 1347  Visit Stop Time: 1410  Total Visit Duration: 23 minutes      Reason for Visit: Follow-up visit regarding medication management     Chief Complaint: "I feel like my depression is still there."    SUBJECTIVE:    Gely Jacobs is a 47 y.o., female, possessing a past psychiatric history significant for bipolar depression, anxiety, and alcohol use disorder, who was personally seen and evaluated at the [address] [place] [address] outpatient clinic for follow-up regarding medication management. She is currently prescribed Depakote 750 mg nightly, Wellbutrin  mg daily, clonidine 0.1 mg twice daily as needed anxiety, and gabapentin 300 mg 3 times daily. Recently started on Prozac 10mg daily. During interview today, Lamont Goldsmith states that since their previous outpatient psychiatric appointment, she has been feeling depressed, "the same". States she still feels down most days, feeling sad and "glum". States she feels she is not enjoying this as much as she used to, and she admits she feels fatigued and down in the afternoons. States she has some energy int he morning, going to the gym, but by the afternoon, feels unmotivated, "I can't get things done". Discussed medications vs. Other treatments, such as [address] or ECT. Patient states she would be interested in [address], but will still go get a physical for ECT clearance. States she has not had any thoughts to hurt herself or anyone else, and is forward and future oriented, looking forward to when family will visit next month. Denies any alcohol relapse, still going to [address] [place] [address] meetings multiple times per week.       Current Rating Scores:   Current PHQ-9   PHQ-2/9 Depression Screening    Little interest or pleasure in doing things: 2 - more than half the days  Feeling down, depressed, or hopeless: 1 - several days  Trouble falling or staying asleep, or sleeping too much: 0 - not at all  Feeling tired or having little energy: 2 - more than half the days  Poor appetite or overeatin - not at all  Feeling bad about yourself - or that you are a failure or have let yourself or your family down: 0 - not at all  Trouble concentrating on things, such as reading the newspaper or watching television: 2 - more than half the days  Moving or speaking so slowly that other people could have noticed. Or the opposite - being so fidgety or restless that you have been moving around a lot more than usual: 1 - several days  Thoughts that you would be better off dead, or of hurting yourself in some way: 0 - not at all  PHQ-9 Score: 8   PHQ-9 Interpretation: Mild depression          Psychiatric Review Of Systems:    Appetite: no change  Adverse eating: no  Weight changes: no change  Insomnia/sleeplessness: no  Fatigue/anergy: yes  Anhedonia/lack of interest: yes  Attention/concentration: no change  Psychomotor agitation/retardation: yes, sluggish  Somatic symptoms: no  Anxiety/panic attack: no  Rachel/hypomania: no  Hopelessness/helplessness/worthlessness: no  Self-injurious behavior/high-risk behavior: no  Suicidal ideation: no  Homicidal ideation: no  Auditory hallucinations: no  Visual hallucinations: no  Other perceptual disturbances: no  Delusional thinking: no  Obsessive/compulsive symptoms: no    Review Of Systems:      Constitutional negative   ENT negative   Cardiovascular negative   Respiratory negative   Gastrointestinal negative   Genitourinary negative   Musculoskeletal negative   Integumentary negative   Neurological negative   Endocrine negative   Other Symptoms none, all other systems are negative     History Review:    The following portions of the patient's history were reviewed and updated as appropriate: allergies, current medications, past family history, past medical history, past social history, past surgical history and problem list..         OBJECTIVE:     Vital signs in last 24 hours: There were no vitals filed for this visit. Mental Status Evaluation:    Appearance age appropriate, casually dressed   Behavior cooperative, still somewhat anxious, remains downcast, fair eye contact   Speech normal rate, normal volume, normal pitch   Mood still dysphoric   Affect constricted   Thought Processes organized, goal directed   Associations intact associations   Thought Content no overt delusions, but pessimistic   Perceptual Disturbances: no auditory hallucinations, no visual hallucinations   Abnormal Thoughts  Risk Potential Suicidal ideation - None  Homicidal ideation - None  Potential for aggression - No   Orientation oriented to person, place, time/date and situation   Memory recent and remote memory grossly intact   Consciousness alert and awake   Attention Span Concentration Span attention span and concentration are age appropriate   Intellect appears to be of average intelligence   Insight intact   Judgement intact   Muscle Strength and  Gait normal muscle strength and normal muscle tone, normal gait and normal balance   Motor activity no abnormal movements   Fund of Knowledge adequate knowledge of current events  adequate fund of knowledge regarding past history  adequate fund of knowledge regarding vocabulary    Pain none   Pain Scale 0       Laboratory Results: I have personally reviewed all pertinent laboratory/tests results    Recent Labs (last 2 months):   No visits with results within 2 Month(s) from this visit.    Latest known visit with results is:   Office Visit on 01/30/2022   Component Date Value   • SARS-CoV-2 01/30/2022 Negative    • INFLUENZA A PCR 01/30/2022 Negative    • INFLUENZA B PCR 01/30/2022 Negative        Suicide/Homicide Risk Assessment:    The following interventions are recommended: contracts for safety at present - agrees to go to ED if feeling unsafe, contracts for safety at present - agrees to call Crisis Intervention Service if feeling unsafe. Although patient's acute lethality risk is low, long-term/chronic lethality risk is mildly elevated in the presence of depression. At the current moment, Yony Client is future-oriented, forward-thinking, and demonstrates ability to act in a self-preserving manner as evidenced by volitionally presenting to the clinic today, seeking treatment. To mitigate future risk, patient should adhere to the recommendations below, avoid alcohol/illicit substance use, utilize community-based resources and familiar support and prioritize mental health treatment. Presently, patient denies active suicidal/homicidal ideation in addition to thoughts of self-injury; contracts for safety. At conclusion of evaluation, patient is amenable to the recommendations below. Patient is amenable to calling/contacting the outpatient office including this writer if any acute adverse effects of their medication regimen arise in addition to any comments or concerns pertaining to their psychiatric management. Patient is amenable to calling/contacting crisis and/or attending to the nearest emergency department if their clinical condition deteriorates to assure their safety and stability, stating that they are able to appropriately confide in their  regarding their psychiatric state. Assessment/Plan:     Diagnoses and all orders for this visit:    Bipolar I disorder, severe, current or most recent episode depressed, with mixed features (HCC)  -     FLUoxetine (PROzac) 20 mg capsule; Take 1 capsule (20 mg total) by mouth daily  -     divalproex sodium (DEPAKOTE ER) 250 mg 24 hr tablet; Take 1 tablet (250 mg total) by mouth daily at bedtime Take with 1500mg for total of 1750mg at bedtime.  -     divalproex sodium (DEPAKOTE ER) 500 mg 24 hr tablet;  Take 3 tablets (1,500 mg total) by mouth daily at bedtime Take with 250mg for total of 1750mg. Anxiety  -     buPROPion (Wellbutrin XL) 300 mg 24 hr tablet; Take 1 tablet (300 mg total) by mouth daily    Carlos Yun is a 58-year-old female has been struggling with worsening anxiety and depression. She seems to be doing well since the partial hospitalization program, and working on DBT skills to manage her anxiety. Her anxiety has been more concerning in the past, after she had relapsed on alcohol due to her anxious distress. She seems very motivated to maintain her sobriety, with significant enrollment in [employer]. Hopefully, starting lithium will help her symptoms. Treatment Recommendations/Precautions:    • For her bipolar depression, patient has been unable to tolerate lithium or increases in Wellbutrin. Will increase Prozac to 20mg daily, advised to monitor for any worsening kenji. • Patient has been unable to tolerate antipsychotics for treatment of her bipolar depression, including Latuda, Abilify and Seroquel, and worried about long term side effects. • Continue Wellbutrin 300mg XL treatment of her depressive symptoms. • We will continue with Depakote 1750 mg nightly for mood stabilization. • For treatment of her anxiety, continue with gabapentin 300 mg 3 times daily, and patient has clonidine 0.1 mg twice daily as needed anxiety. • Medication management every 4 weeks  • Aware of 24 hour and weekend coverage for urgent situations accessed by calling [facility] main practice number  • Referral for ECT  • Referral for [address]  Patient advised to call 911 if feeling suicidal or homicidal before acting out on their thoughts and they expressed understanding. Medications Risks/Benefits      Risks, Benefits And Possible Side Effects Of Medications:    Risks, benefits, and possible side effects of medications explained to Carlos Yun and she verbalizes understanding and agreement for treatment.  including: Risks and potential side effects of medication discussed with patient including serotonin syndrome, SIADH, worsening depression, suicidality, induction of kenji, GI upset, headaches, activation, sexual side effects, sedation, potential drug interactions, and others. Patient expressed understanding.        Controlled Medication Discussion:     Not applicable    Psychotherapy Provided:     Individual psychotherapy provided: No     Treatment Plan:    Completed and signed during the session: Not applicable - Treatment Plan not due at this session    This note was not shared with the patient due to this is a psychotherapy note      Miki Saba DO 09/27/23

## 2023-11-09 DIAGNOSIS — F41.9 ANXIETY: ICD-10-CM

## 2023-11-09 DIAGNOSIS — F31.4 BIPOLAR I DISORDER, SEVERE, CURRENT OR MOST RECENT EPISODE DEPRESSED, WITH MIXED FEATURES (HCC): ICD-10-CM

## 2023-11-09 RX ORDER — GABAPENTIN 300 MG/1
300 CAPSULE ORAL 3 TIMES DAILY
Qty: 90 CAPSULE | Refills: 1 | Status: SHIPPED | OUTPATIENT
Start: 2023-11-09

## 2023-11-09 RX ORDER — CLONIDINE HYDROCHLORIDE 0.1 MG/1
0.1 TABLET ORAL 2 TIMES DAILY PRN
Qty: 60 TABLET | Refills: 1 | Status: SHIPPED | OUTPATIENT
Start: 2023-11-09

## 2023-11-09 RX ORDER — BUPROPION HYDROCHLORIDE 300 MG/1
300 TABLET ORAL DAILY
Qty: 90 TABLET | Refills: 1 | Status: SHIPPED | OUTPATIENT
Start: 2023-11-09

## 2023-11-09 RX ORDER — DIVALPROEX SODIUM 500 MG/1
1500 TABLET, EXTENDED RELEASE ORAL
Qty: 270 TABLET | Refills: 2 | Status: SHIPPED | OUTPATIENT
Start: 2023-11-09

## 2023-11-09 RX ORDER — FLUOXETINE HYDROCHLORIDE 20 MG/1
20 CAPSULE ORAL DAILY
Qty: 30 CAPSULE | Refills: 1 | Status: SHIPPED | OUTPATIENT
Start: 2023-11-09

## 2023-11-09 RX ORDER — DIVALPROEX SODIUM 250 MG/1
250 TABLET, EXTENDED RELEASE ORAL
Qty: 90 TABLET | Refills: 2 | Status: SHIPPED | OUTPATIENT
Start: 2023-11-09

## 2024-03-07 ENCOUNTER — APPOINTMENT (OUTPATIENT)
Dept: RADIOLOGY | Facility: CLINIC | Age: 54
End: 2024-03-07
Payer: COMMERCIAL

## 2024-03-07 ENCOUNTER — OFFICE VISIT (OUTPATIENT)
Dept: URGENT CARE | Facility: CLINIC | Age: 54
End: 2024-03-07
Payer: COMMERCIAL

## 2024-03-07 VITALS
TEMPERATURE: 97.3 F | DIASTOLIC BLOOD PRESSURE: 82 MMHG | SYSTOLIC BLOOD PRESSURE: 124 MMHG | OXYGEN SATURATION: 99 % | RESPIRATION RATE: 20 BRPM | HEART RATE: 77 BPM

## 2024-03-07 DIAGNOSIS — R09.1 PLEURISY: Primary | ICD-10-CM

## 2024-03-07 DIAGNOSIS — M54.6 ACUTE BILATERAL THORACIC BACK PAIN: ICD-10-CM

## 2024-03-07 PROCEDURE — 99214 OFFICE O/P EST MOD 30 MIN: CPT | Performed by: NURSE PRACTITIONER

## 2024-03-07 PROCEDURE — G0463 HOSPITAL OUTPT CLINIC VISIT: HCPCS | Performed by: NURSE PRACTITIONER

## 2024-03-07 PROCEDURE — 71046 X-RAY EXAM CHEST 2 VIEWS: CPT

## 2024-03-07 RX ORDER — MIRTAZAPINE 15 MG/1
15 TABLET, FILM COATED ORAL
COMMUNITY
Start: 2024-02-06

## 2024-03-07 RX ORDER — ROPINIROLE 0.25 MG/1
TABLET, FILM COATED ORAL
COMMUNITY
Start: 2024-03-06

## 2024-03-07 RX ORDER — PREDNISONE 10 MG/1
TABLET ORAL
Qty: 24 TABLET | Refills: 0 | Status: SHIPPED | OUTPATIENT
Start: 2024-03-07

## 2024-03-07 NOTE — RESULT ENCOUNTER NOTE
Pt returned phone call.  Discussed with her the findings on the CXR.  She states she took 3 ibuprofens w/o relief.  She denies picking up the prednisone as of yet.   She states the pain is worse since she got home.  She states she sees Dr. Hays office.  NP called Dr. Joseph office and explained abnormal CXR ? T8 compression fracture.  They scheduled her for 340pm today.  Called patient back and informed her of 340 appt today. She states she will go.  Informed her not to take any prednisone until sees PCP as she most likely will need CT or MRI.  She verbalizes understanding.

## 2024-03-07 NOTE — RESULT ENCOUNTER NOTE
XR CHEST PA & LATERAL   INDICATION: M54.6: Pain in thoracic spine.   COMPARISON: None   FINDINGS:   Clear lungs. No pneumothorax or pleural effusion.   Normal cardiomediastinal silhouette.   Mild height loss of a midthoracic vertebral body (likely T8) with mild anterior wedging.   No displaced fracture.  Normal upper abdomen.   IMPRESSION:  Mild height loss of a midthoracic vertebral body (likely T8) is of uncertain chronicity.    LM on pt VM to have her return call so we could discuss.

## 2024-03-07 NOTE — PROGRESS NOTES
[facility]        NAME: Krystal Gruber is a 53 y.o. female  : 1970    MRN: [id number]  DATE: 2024  TIME: 10:52 AM    Assessment and Plan   Pleurisy [R09.1]  1. Pleurisy  predniSONE 10 mg tablet      2. Acute bilateral thoracic back pain  XR chest pa & lateral    predniSONE 10 mg tablet            Patient Instructions       Follow up with PCP in 3-5 days.  Proceed to  ER if symptoms worsen.    If tests have been performed at [facility], our office will contact you with results if changes need to be made to the care plan discussed with you at the visit.  You can review your full results on [facility].    Your xray was preliminarily read by your provider.  A radiologist will read the xray and you will be notified if it is abnormal.    You are to take the prednisone as prescribed.   You have been prescribed prednisone. Take with food. Do NOT take any NSAID products (motrin, ibuprofen, aleve, advil) while taking this medication.  You may take tylenol.   Follow up with your PCP in 3-5 days  Go to the ED if symptoms worsen   Rest. No exercising for several days.         Chief Complaint     Chief Complaint   Patient presents with    Back Pain         History of Present Illness       This is a 53 year old female who states was out running on Monday and when returned she had mid thoracic back pain on bilateral sides.  She states that she was stretching prior to running. Denies any injuries.  She states she does have some pain with inspiration and worse with movement. She states she has trouble rolling over in bed and moving.  She states heat seems to help but as soon as she stops the heat the pain returns. Tylenol and motrin are not working for her.   She denies hx of DVT or PE. Denies recent long travel. Denies autoimmune issues.  She has not called her PCP.   History of smoking a long time ago.  Denies chest pain, SOB, cold symptoms.     Back Pain        Review of Systems   Review of Systems    Constitutional: Negative.    HENT: Negative.     Eyes: Negative.    Respiratory:          Pain with inspiration    Cardiovascular: Negative.    Gastrointestinal: Negative.    Endocrine: Negative.    Genitourinary: Negative.    Musculoskeletal:  Positive for back pain.   Skin: Negative.    Allergic/Immunologic: Negative.    Neurological: Negative.    Hematological: Negative.    Psychiatric/Behavioral: Negative.           Current Medications       Current Outpatient Medications:     buPROPion (Wellbutrin XL) 300 mg 24 hr tablet, Take 1 tablet (300 mg total) by mouth daily, Disp: 90 tablet, Rfl: 1    cloNIDine (Catapres) 0.1 mg tablet, Take 1 tablet (0.1 mg total) by mouth 2 (two) times a day as needed (anxiety), Disp: 60 tablet, Rfl: 1    divalproex sodium (DEPAKOTE ER) 250 mg 24 hr tablet, Take 1 tablet (250 mg total) by mouth daily at bedtime Take with 1500mg for total of 1750mg at bedtime., Disp: 90 tablet, Rfl: 2    divalproex sodium (DEPAKOTE ER) 500 mg 24 hr tablet, Take 3 tablets (1,500 mg total) by mouth daily at bedtime Take with 250mg for total of 1750mg., Disp: 270 tablet, Rfl: 2    mirtazapine (REMERON) 15 mg tablet, Take 15 mg by mouth daily at bedtime as needed, Disp: , Rfl:     Multiple Vitamin (multivitamin) capsule, Take 1 capsule by mouth daily, Disp: , Rfl:     predniSONE 10 mg tablet, Take 5 tabs po x 2 days; 4 tabs po x 2 days; 3 tabs po x 1 day; 2 tabs po x 1 day. 1 tab po x 1 day., Disp: 24 tablet, Rfl: 0    rOPINIRole (REQUIP) 0.25 mg tablet, , Disp: , Rfl:     FLUoxetine (PROzac) 20 mg capsule, Take 1 capsule (20 mg total) by mouth daily (Patient not taking: Reported on 3/7/2024), Disp: 30 capsule, Rfl: 1    gabapentin (Neurontin) 300 mg capsule, Take 1 capsule (300 mg total) by mouth 3 (three) times a day (Patient not taking: Reported on 3/7/2024), Disp: 90 capsule, Rfl: 1    Current Allergies     Allergies as of 03/07/2024    (No Known Allergies)            The following portions of the  patient's history were reviewed and updated as appropriate: allergies, current medications, past family history, past medical history, past social history, past surgical history and problem list.     Past Medical History:   Diagnosis Date    Alcohol abuse     Anxiety     Bipolar 1 disorder (HCC)     Depression     Restless legs syndrome (RLS)     Varicose veins of left lower extremity        Past Surgical History:   Procedure Laterality Date     SECTION      VEIN LIGATION Left 2021    Procedure: multiple stab phlebectomies left lower extremity, no stripping and ligation no laser ablation;  Surgeon: Derrick Menchaca MD;  Location: [facility] OR;  Service: Vascular       Family History   Problem Relation Age of Onset    No Known Problems Mother     Diabetes Father     Hypertension Father     No Known Problems Sister     No Known Problems Sister     No Known Problems Daughter     No Known Problems Maternal Grandmother     No Known Problems Paternal Grandmother     No Known Problems Maternal Aunt     No Known Problems Maternal Aunt     No Known Problems Maternal Aunt     No Known Problems Maternal Aunt     No Known Problems Maternal Aunt     No Known Problems Maternal Aunt     No Known Problems Paternal Aunt          Medications have been verified.        Objective   /82   Pulse 77   Temp (!) 97.3 °F (36.3 °C)   Resp 20   LMP 2021   SpO2 99%   Patient's last menstrual period was 2021.       Physical Exam     Physical Exam  Vitals and nursing note reviewed.   Constitutional:       General: She is not in acute distress.     Appearance: Normal appearance. She is normal weight. She is not ill-appearing, toxic-appearing or diaphoretic.   HENT:      Head: Normocephalic and atraumatic.   Eyes:      Extraocular Movements: Extraocular movements intact.   Cardiovascular:      Rate and Rhythm: Normal rate and regular rhythm.      Pulses: Normal pulses.      Heart sounds: Normal heart sounds. No murmur  heard.  Pulmonary:      Effort: Pulmonary effort is normal. No respiratory distress.      Breath sounds: Normal breath sounds. No stridor. No wheezing, rhonchi or rales.   Chest:      Chest wall: No tenderness.   Musculoskeletal:         General: No tenderness. Normal range of motion.      Cervical back: Normal range of motion and neck supple.      Comments: Unable to reproduce pain on B/L T spine para muscles  No edema.     Skin:     General: Skin is warm and dry.      Capillary Refill: Capillary refill takes less than 2 seconds.   Neurological:      General: No focal deficit present.      Mental Status: She is alert and oriented to person, place, and time.   Psychiatric:         Mood and Affect: Mood normal.         Behavior: Behavior normal.         Thought Content: Thought content normal.         Judgment: Judgment normal.         Preliminary reading CXR  No effusions of fluid noted  Waiting on rad read       Doubt PE as VSS no temp, no tachycardia, no history  O2 sat stable.      Pt concerned regarding pain, explained prednisone and tylenol will help with pain and if she needs something stronger she will need to see ED or PCP.

## 2024-03-07 NOTE — PATIENT INSTRUCTIONS
Your xray was preliminarily read by your provider.  A radiologist will read the xray and you will be notified if it is abnormal.    You are to take the prednisone as prescribed.   You have been prescribed prednisone. Take with food. Do NOT take any NSAID products (motrin, ibuprofen, aleve, advil) while taking this medication.  You may take tylenol.   Follow up with your PCP in 3-5 days  Go to the ED if symptoms worsen   Rest. No exercising for several days.

## 2024-04-15 ENCOUNTER — APPOINTMENT (OUTPATIENT)
Dept: LAB | Facility: CLINIC | Age: 54
End: 2024-04-15
Payer: COMMERCIAL

## 2024-04-15 DIAGNOSIS — E04.1 THYROID NODULE: ICD-10-CM

## 2024-04-15 LAB — TSH SERPL DL<=0.05 MIU/L-ACNC: 0.91 UIU/ML (ref 0.45–4.5)

## 2024-04-15 PROCEDURE — 36415 COLL VENOUS BLD VENIPUNCTURE: CPT

## 2024-04-15 PROCEDURE — 84443 ASSAY THYROID STIM HORMONE: CPT

## 2024-05-07 ENCOUNTER — APPOINTMENT (OUTPATIENT)
Dept: LAB | Facility: CLINIC | Age: 54
End: 2024-05-07
Payer: COMMERCIAL

## 2024-05-07 DIAGNOSIS — M80.00XD OSTEOPOROSIS WITH CURRENT PATHOLOGICAL FRACTURE WITH ROUTINE HEALING, UNSPECIFIED OSTEOPOROSIS TYPE, SUBSEQUENT ENCOUNTER: ICD-10-CM

## 2024-05-07 DIAGNOSIS — E55.9 VITAMIN D DEFICIENCY: ICD-10-CM

## 2024-05-07 LAB
25(OH)D3 SERPL-MCNC: 40.5 NG/ML (ref 30–100)
IGA SERPL-MCNC: 224 MG/DL (ref 66–433)
PTH-INTACT SERPL-MCNC: 40.3 PG/ML (ref 12–88)

## 2024-05-07 PROCEDURE — 36415 COLL VENOUS BLD VENIPUNCTURE: CPT

## 2024-05-07 PROCEDURE — 82784 ASSAY IGA/IGD/IGG/IGM EACH: CPT

## 2024-05-07 PROCEDURE — 86258 DGP ANTIBODY EACH IG CLASS: CPT

## 2024-05-07 PROCEDURE — 86364 TISS TRNSGLTMNASE EA IG CLAS: CPT

## 2024-05-07 PROCEDURE — 83970 ASSAY OF PARATHORMONE: CPT

## 2024-05-07 PROCEDURE — 82306 VITAMIN D 25 HYDROXY: CPT

## 2024-05-08 LAB
GLIADIN PEPTIDE IGA SER-ACNC: <0.2 U/ML
GLIADIN PEPTIDE IGA SER-ACNC: NEGATIVE
GLIADIN PEPTIDE IGG SER-ACNC: <0.4 U/ML
GLIADIN PEPTIDE IGG SER-ACNC: NEGATIVE
TTG IGA SER-ACNC: <0.5 U/ML
TTG IGA SER-ACNC: NEGATIVE
TTG IGG SER-ACNC: <0.8 U/ML
TTG IGG SER-ACNC: NEGATIVE

## 2025-06-05 ENCOUNTER — TELEPHONE (OUTPATIENT)
Age: 55
End: 2025-06-05

## 2025-06-05 NOTE — TELEPHONE ENCOUNTER
A technician from [facility] mail order pharmacy called to request a refill of bupropion, authorized by Dr. De La Rosa, on behalf of the patient. He was made aware that patient has not been seen Sept. 2023 and would have to make an appointment for refills of the medication. The technician noted that her records showed the med was most recently prescribed by another provider, which is outside of [facility]. He said he would follow up with the patient.

## (undated) DEVICE — GLOVE SRG BIOGEL 7.5

## (undated) DEVICE — COBAN 4 IN STERILE

## (undated) DEVICE — DRAPE SHEET X-LG

## (undated) DEVICE — TIBURON SPLIT SHEET: Brand: CONVERTORS

## (undated) DEVICE — ACE WRAP 4 IN UNSTERILE

## (undated) DEVICE — TUBING SUCTION 5MM X 12 FT

## (undated) DEVICE — LIGACLIP MCA MULTIPLE CLIP APPLIERS, 20 SMALL CLIPS: Brand: LIGACLIP

## (undated) DEVICE — ACE WRAP 6 IN UNSTERILE

## (undated) DEVICE — VEIN STRIPPER

## (undated) DEVICE — 3M™ STERI-STRIP™ REINFORCED ADHESIVE SKIN CLOSURES, R1542, 1/4 IN X 1-1/2 IN (6 MM X 38 MM), 6 STRIPS/ENVELOPE: Brand: 3M™ STERI-STRIP™

## (undated) DEVICE — LIGACLIP MCA MULTIPLE CLIP APPLIERS, 20 MEDIUM CLIPS: Brand: LIGACLIP

## (undated) DEVICE — ELECTRODE BLADE MOD  E-Z CLEAN 6.5IN -0014M

## (undated) DEVICE — POV-IOD SWAB STICKS

## (undated) DEVICE — DRAPE SHEET THREE QUARTER

## (undated) DEVICE — PLUMEPEN PRO 10FT

## (undated) DEVICE — KERLIX BANDAGE ROLL: Brand: KERLIX

## (undated) DEVICE — SPONGE STICK WITH PVP-I: Brand: KENDALL

## (undated) DEVICE — BETHLEHEM UNIVERSAL MINOR GEN: Brand: CARDINAL HEALTH

## (undated) DEVICE — GAUZE SPONGES,16 PLY: Brand: CURITY